# Patient Record
Sex: FEMALE | Race: WHITE | NOT HISPANIC OR LATINO | Employment: OTHER | URBAN - METROPOLITAN AREA
[De-identification: names, ages, dates, MRNs, and addresses within clinical notes are randomized per-mention and may not be internally consistent; named-entity substitution may affect disease eponyms.]

---

## 2017-03-29 DIAGNOSIS — Z00.00 HEALTH CARE MAINTENANCE: Primary | ICD-10-CM

## 2017-04-03 ENCOUNTER — RESULTS ENCOUNTER (OUTPATIENT)
Dept: FAMILY MEDICINE CLINIC | Facility: CLINIC | Age: 77
End: 2017-04-03

## 2017-04-03 DIAGNOSIS — Z00.00 HEALTH CARE MAINTENANCE: ICD-10-CM

## 2017-04-03 LAB
ALBUMIN SERPL-MCNC: 4.5 G/DL (ref 3.5–5.2)
ALBUMIN/GLOB SERPL: 1.8 G/DL
ALP SERPL-CCNC: 49 U/L (ref 39–117)
ALT SERPL-CCNC: 17 U/L (ref 1–33)
AST SERPL-CCNC: 23 U/L (ref 1–32)
BASOPHILS # BLD AUTO: 0.05 10*3/MM3 (ref 0–0.2)
BASOPHILS NFR BLD AUTO: 1 % (ref 0–1.5)
BILIRUB SERPL-MCNC: 0.3 MG/DL (ref 0.1–1.2)
BUN SERPL-MCNC: 14 MG/DL (ref 8–23)
BUN/CREAT SERPL: 17.7 (ref 7–25)
CALCIUM SERPL-MCNC: 9.4 MG/DL (ref 8.6–10.5)
CHLORIDE SERPL-SCNC: 99 MMOL/L (ref 98–107)
CO2 SERPL-SCNC: 28.3 MMOL/L (ref 22–29)
CREAT SERPL-MCNC: 0.79 MG/DL (ref 0.57–1)
EOSINOPHIL # BLD AUTO: 0.21 10*3/MM3 (ref 0–0.7)
EOSINOPHIL NFR BLD AUTO: 4.1 % (ref 0.3–6.2)
ERYTHROCYTE [DISTWIDTH] IN BLOOD BY AUTOMATED COUNT: 12.9 % (ref 11.7–13)
GLOBULIN SER CALC-MCNC: 2.5 GM/DL
GLUCOSE SERPL-MCNC: 81 MG/DL (ref 65–99)
HCT VFR BLD AUTO: 39.1 % (ref 35.6–45.5)
HGB BLD-MCNC: 12.6 G/DL (ref 11.9–15.5)
IMM GRANULOCYTES # BLD: 0 10*3/MM3 (ref 0–0.03)
IMM GRANULOCYTES NFR BLD: 0 % (ref 0–0.5)
LYMPHOCYTES # BLD AUTO: 2.01 10*3/MM3 (ref 0.9–4.8)
LYMPHOCYTES NFR BLD AUTO: 39 % (ref 19.6–45.3)
MCH RBC QN AUTO: 29.9 PG (ref 26.9–32)
MCHC RBC AUTO-ENTMCNC: 32.2 G/DL (ref 32.4–36.3)
MCV RBC AUTO: 92.9 FL (ref 80.5–98.2)
MONOCYTES # BLD AUTO: 0.49 10*3/MM3 (ref 0.2–1.2)
MONOCYTES NFR BLD AUTO: 9.5 % (ref 5–12)
NEUTROPHILS # BLD AUTO: 2.4 10*3/MM3 (ref 1.9–8.1)
NEUTROPHILS NFR BLD AUTO: 46.4 % (ref 42.7–76)
PLATELET # BLD AUTO: 257 10*3/MM3 (ref 140–500)
POTASSIUM SERPL-SCNC: 4.4 MMOL/L (ref 3.5–5.2)
PROT SERPL-MCNC: 7 G/DL (ref 6–8.5)
RBC # BLD AUTO: 4.21 10*6/MM3 (ref 3.9–5.2)
SODIUM SERPL-SCNC: 139 MMOL/L (ref 136–145)
WBC # BLD AUTO: 5.16 10*3/MM3 (ref 4.5–10.7)

## 2017-04-04 NOTE — PROGRESS NOTES
Pt informed and labs were faxed to dr. amada de at [facility] at fax- [phone]

## 2017-04-17 ENCOUNTER — APPOINTMENT (OUTPATIENT)
Dept: WOMENS IMAGING | Facility: HOSPITAL | Age: 77
End: 2017-04-17

## 2017-04-17 PROCEDURE — 77063 BREAST TOMOSYNTHESIS BI: CPT | Performed by: RADIOLOGY

## 2017-04-17 PROCEDURE — G0202 SCR MAMMO BI INCL CAD: HCPCS | Performed by: RADIOLOGY

## 2017-04-17 PROCEDURE — MDREVIEWSP: Performed by: RADIOLOGY

## 2017-05-30 RX ORDER — DILTIAZEM HYDROCHLORIDE 60 MG/1
TABLET, FILM COATED ORAL
Qty: 10.2 G | Refills: 0 | Status: SHIPPED | OUTPATIENT
Start: 2017-05-30 | End: 2017-09-23 | Stop reason: SDUPTHER

## 2017-09-25 RX ORDER — DILTIAZEM HYDROCHLORIDE 60 MG/1
TABLET, FILM COATED ORAL
Qty: 10.2 G | Refills: 0 | Status: SHIPPED | OUTPATIENT
Start: 2017-09-25 | End: 2018-03-06

## 2018-01-02 RX ORDER — DILTIAZEM HYDROCHLORIDE 60 MG/1
TABLET, FILM COATED ORAL
Qty: 10.2 G | Refills: 0 | Status: SHIPPED | OUTPATIENT
Start: 2018-01-02 | End: 2018-04-20 | Stop reason: SDUPTHER

## 2018-02-27 DIAGNOSIS — E78.5 HYPERLIPIDEMIA, UNSPECIFIED HYPERLIPIDEMIA TYPE: ICD-10-CM

## 2018-02-27 DIAGNOSIS — Z00.00 INITIAL MEDICARE ANNUAL WELLNESS VISIT: Primary | ICD-10-CM

## 2018-02-28 LAB
ALBUMIN SERPL-MCNC: 4.5 G/DL (ref 3.5–5.2)
ALBUMIN/GLOB SERPL: 1.7 G/DL
ALP SERPL-CCNC: 53 U/L (ref 39–117)
ALT SERPL-CCNC: 12 U/L (ref 1–33)
AST SERPL-CCNC: 21 U/L (ref 1–32)
BASOPHILS # BLD AUTO: 0.04 10*3/MM3 (ref 0–0.2)
BASOPHILS NFR BLD AUTO: 0.6 % (ref 0–1.5)
BILIRUB SERPL-MCNC: 0.4 MG/DL (ref 0.1–1.2)
BUN SERPL-MCNC: 16 MG/DL (ref 8–23)
BUN/CREAT SERPL: 21.3 (ref 7–25)
CALCIUM SERPL-MCNC: 9.4 MG/DL (ref 8.6–10.5)
CHLORIDE SERPL-SCNC: 98 MMOL/L (ref 98–107)
CHOLEST SERPL-MCNC: 283 MG/DL (ref 0–200)
CO2 SERPL-SCNC: 28.8 MMOL/L (ref 22–29)
CREAT SERPL-MCNC: 0.75 MG/DL (ref 0.57–1)
EOSINOPHIL # BLD AUTO: 0.43 10*3/MM3 (ref 0–0.7)
EOSINOPHIL NFR BLD AUTO: 6.5 % (ref 0.3–6.2)
ERYTHROCYTE [DISTWIDTH] IN BLOOD BY AUTOMATED COUNT: 12.7 % (ref 11.7–13)
GFR SERPLBLD CREATININE-BSD FMLA CKD-EPI: 75 ML/MIN/1.73
GFR SERPLBLD CREATININE-BSD FMLA CKD-EPI: 90 ML/MIN/1.73
GLOBULIN SER CALC-MCNC: 2.6 GM/DL
GLUCOSE SERPL-MCNC: 94 MG/DL (ref 65–99)
HCT VFR BLD AUTO: 40.2 % (ref 35.6–45.5)
HDLC SERPL-MCNC: 86 MG/DL (ref 40–60)
HGB BLD-MCNC: 13 G/DL (ref 11.9–15.5)
IMM GRANULOCYTES # BLD: 0 10*3/MM3 (ref 0–0.03)
IMM GRANULOCYTES NFR BLD: 0 % (ref 0–0.5)
LDLC SERPL CALC-MCNC: 181 MG/DL (ref 0–100)
LDLC/HDLC SERPL: 2.1 {RATIO}
LYMPHOCYTES # BLD AUTO: 2.04 10*3/MM3 (ref 0.9–4.8)
LYMPHOCYTES NFR BLD AUTO: 30.7 % (ref 19.6–45.3)
MCH RBC QN AUTO: 30.2 PG (ref 26.9–32)
MCHC RBC AUTO-ENTMCNC: 32.3 G/DL (ref 32.4–36.3)
MCV RBC AUTO: 93.3 FL (ref 80.5–98.2)
MONOCYTES # BLD AUTO: 0.62 10*3/MM3 (ref 0.2–1.2)
MONOCYTES NFR BLD AUTO: 9.3 % (ref 5–12)
NEUTROPHILS # BLD AUTO: 3.52 10*3/MM3 (ref 1.9–8.1)
NEUTROPHILS NFR BLD AUTO: 52.9 % (ref 42.7–76)
PLATELET # BLD AUTO: 250 10*3/MM3 (ref 140–500)
POTASSIUM SERPL-SCNC: 4.4 MMOL/L (ref 3.5–5.2)
PROT SERPL-MCNC: 7.1 G/DL (ref 6–8.5)
RBC # BLD AUTO: 4.31 10*6/MM3 (ref 3.9–5.2)
SODIUM SERPL-SCNC: 138 MMOL/L (ref 136–145)
TRIGL SERPL-MCNC: 82 MG/DL (ref 0–150)
VLDLC SERPL CALC-MCNC: 16.4 MG/DL (ref 5–40)
WBC # BLD AUTO: 6.65 10*3/MM3 (ref 4.5–10.7)

## 2018-03-06 ENCOUNTER — OFFICE VISIT (OUTPATIENT)
Dept: FAMILY MEDICINE CLINIC | Facility: CLINIC | Age: 78
End: 2018-03-06

## 2018-03-06 VITALS
OXYGEN SATURATION: 97 % | TEMPERATURE: 97.5 F | HEART RATE: 70 BPM | HEIGHT: 61 IN | SYSTOLIC BLOOD PRESSURE: 106 MMHG | BODY MASS INDEX: 25.71 KG/M2 | DIASTOLIC BLOOD PRESSURE: 66 MMHG | WEIGHT: 136.2 LBS

## 2018-03-06 DIAGNOSIS — A31.0 MAI (MYCOBACTERIUM AVIUM-INTRACELLULARE) (HCC): ICD-10-CM

## 2018-03-06 DIAGNOSIS — J47.9 BRONCHIECTASIS WITHOUT COMPLICATION (HCC): ICD-10-CM

## 2018-03-06 DIAGNOSIS — Z00.00 HEALTH CARE MAINTENANCE: Primary | ICD-10-CM

## 2018-03-06 DIAGNOSIS — E78.00 PURE HYPERCHOLESTEROLEMIA: ICD-10-CM

## 2018-03-06 PROCEDURE — G0438 PPPS, INITIAL VISIT: HCPCS | Performed by: INTERNAL MEDICINE

## 2018-03-06 NOTE — PROGRESS NOTES
QUICK REFERENCE INFORMATION:  The ABCs of the Annual Wellness Visit    Initial Medicare Wellness Visit    HEALTH RISK ASSESSMENT    1940    Recent Hospitalizations:  No hospitalization(s) within the last year..        Current Medical Providers:  Patient Care Team:  Zan Leyva MD as PCP - General (Internal Medicine)  No Known Provider as PCP - Family Medicine        Smoking Status:  History   Smoking Status   • Former Smoker   Smokeless Tobacco   • Never Used       Alcohol Consumption:  History   Alcohol Use   • Yes     Comment: Social use       Depression Screen:   PHQ-2/PHQ-9 Depression Screening 3/6/2018   Little interest or pleasure in doing things 0   Feeling down, depressed, or hopeless 0   Total Score 0       Health Habits and Functional and Cognitive Screening:  Functional & Cognitive Status 3/6/2018   Do you have difficulty preparing food and eating? No   Do you have difficulty bathing yourself, getting dressed or grooming yourself? No   Do you have difficulty using the toilet? No   Do you have difficulty moving around from place to place? No   Do you have trouble with steps or getting out of a bed or a chair? No   In the past year have you fallen or experienced a near fall? No   Current Diet Well Balanced Diet   Dental Exam Up to date   Eye Exam Up to date   Exercise (times per week) 7 times per week   Current Exercise Activities Include Walking   Do you need help using the phone?  No   Are you deaf or do you have serious difficulty hearing?  Yes   Do you need help with transportation? No   Do you need help shopping? No   Do you need help preparing meals?  No   Do you need help with housework?  No   Do you need help with laundry? No   Do you need help taking your medications? No   Do you need help managing money? No   Have you felt unusual stress, anger or loneliness in the last month? No   Who do you live with? Spouse   If you need help, do you have trouble finding someone available to you? No    Have you been bothered in the last four weeks by sexual problems? No   Do you have difficulty concentrating, remembering or making decisions? Yes           Does the patient have evidence of cognitive impairment? No    Asiprin use counseling: Does not need ASA (and currently is not on it)      Recent Lab Results:    Visual Acuity:  No exam data present    Age-appropriate Screening Schedule:  Refer to the list below for future screening recommendations based on patient's age, sex and/or medical conditions. Orders for these recommended tests are listed in the plan section. The patient has been provided with a written plan.    Health Maintenance   Topic Date Due   • TDAP/TD VACCINES (1 - Tdap) 05/03/1959   • ZOSTER VACCINE  01/20/2016   • LIPID PANEL  02/28/2019   • MAMMOGRAM  04/17/2019   • INFLUENZA VACCINE  Completed   • PNEUMOCOCCAL VACCINES (65+ LOW/MEDIUM RISK)  Completed        Subjective   History of Present Illness - This patient is here today for a wellness visit.  She also is being treated at [facility] for MARIAMA, hypercholesterolemia, bronchiectasis, hypercholesterolemia.  We reviewed her labs her cholesterol is high she refuses to go on a statin even though I did tell her that high cholesterol increases her risk for having a heart attack or stroke.  She's caught up on her routine screenings.    Assessment plan    Health care maintenance changes in diet continue exercise    MARIAMA she sees an ID doctor at [facility] we will fax labs to her ID doctor and she is on multiple medicines for this.    Hypercholesterolemia-refuses statin recommend diet excise    Bronchiectasis she's on inhalers.    Jenniffer Dumont is a 77 y.o. female who presents for an Annual Wellness Visit.    The following portions of the patient's history were reviewed and updated as appropriate: allergies, current medications, past family history, past medical history, past social history, past surgical history and problem  list.    Outpatient Medications Prior to Visit   Medication Sig Dispense Refill   • Ascorbic Acid (VITAMIN C) 500 MG capsule Take by mouth daily.     • Calcium Carbonate-Vitamin D (CALCIUM 500 + D PO) Take by mouth daily.     • Coenzyme Q10 (COQ-10 PO) Take by mouth.     • fexofenadine (ALLEGRA) 180 MG tablet Take by mouth daily.     • fluticasone (FLONASE) 50 MCG/ACT nasal spray into each nostril.     • Multiple Vitamins-Minerals (MULTIVITAMIN WITH MINERALS) tablet Take 1 tablet by mouth daily.     • SYMBICORT 80-4.5 MCG/ACT inhaler INHALE 2 PUFFS BY MOUTH TWICE DAILY AS DIRECTED 10.2 g 0   • budesonide-formoterol (SYMBICORT) 80-4.5 MCG/ACT inhaler daily.     • SYMBICORT 80-4.5 MCG/ACT inhaler INHALE 2 PUFFS BY MOUTH TWICE DAILY AS DIRECTED 10.2 g 0     No facility-administered medications prior to visit.        Patient Active Problem List   Diagnosis   • Acute infection of nasal sinus   • Bronchiectasis   • Cough   • HLD (hyperlipidemia)   • PNA (pneumonia)   • Carpal bone fracture   • Health care maintenance   • MARIAMA (mycobacterium avium-intracellulare)       Advance Care Planning:  has an advance directive - a copy HAS NOT been provided    Identification of Risk Factors:  Risk factors include: unhealthy diet.    Review of Systems   Constitutional: Negative.    HENT: Negative.    Eyes: Negative.    Respiratory: Positive for cough.    Cardiovascular: Negative.    Gastrointestinal: Negative.    Endocrine: Negative.    Genitourinary: Negative.    Musculoskeletal: Negative.    Skin: Negative.    Allergic/Immunologic: Negative.    Neurological: Negative.    Hematological: Negative.    Psychiatric/Behavioral: Negative.        Compared to one year ago, the patient feels her physical health is the same.  Compared to one year ago, the patient feels her mental health is the same.    Objective     Physical Exam   Constitutional: She is oriented to person, place, and time. She appears well-developed and well-nourished. No  "distress.   HENT:   Head: Normocephalic and atraumatic.   Eyes: Conjunctivae and EOM are normal. Pupils are equal, round, and reactive to light. Right eye exhibits no discharge. Left eye exhibits no discharge. No scleral icterus.   Neck: Normal range of motion. Neck supple. No tracheal deviation present. No thyromegaly present.   Cardiovascular: Normal rate, regular rhythm, normal heart sounds and normal pulses.  Exam reveals no gallop.    No murmur heard.  Pulmonary/Chest: Effort normal. No respiratory distress. She has no wheezes. She has rales. She exhibits no tenderness.   Abdominal: Soft. Bowel sounds are normal. There is no tenderness.   Musculoskeletal: Normal range of motion.   Neurological: She is alert and oriented to person, place, and time. She exhibits normal muscle tone. Coordination normal.   Skin: Skin is warm and dry. No rash noted. No erythema. No pallor.   Psychiatric: She has a normal mood and affect. Her behavior is normal. Judgment and thought content normal.   Nursing note and vitals reviewed.      Vitals:    03/06/18 1505   BP: 106/66   Pulse: 70   Temp: 97.5 °F (36.4 °C)   TempSrc: Oral   SpO2: 97%   Weight: 61.8 kg (136 lb 3.2 oz)   Height: 153.7 cm (60.5\")       Body mass index is 26.16 kg/(m^2).  Discussed the patient's BMI with her. BMI is within normal parameters. No follow-up required.    Assessment/Plan   Patient Self-Management and Personalized Health Advice  The patient has been provided with information about: diet and preventive services including:   · Advance directive.    Visit Diagnoses:    ICD-10-CM ICD-9-CM   1. Health care maintenance Z00.00 V70.0   2. Bronchiectasis without complication J47.9 494.0   3. Pure hypercholesterolemia E78.00 272.0   4. MARIAMA (mycobacterium avium-intracellulare) A31.0 031.0       No orders of the defined types were placed in this encounter.      Outpatient Encounter Prescriptions as of 3/6/2018   Medication Sig Dispense Refill   • Ascorbic Acid " (VITAMIN C) 500 MG capsule Take by mouth daily.     • Calcium Carbonate-Vitamin D (CALCIUM 500 + D PO) Take by mouth daily.     • Coenzyme Q10 (COQ-10 PO) Take by mouth.     • fexofenadine (ALLEGRA) 180 MG tablet Take by mouth daily.     • fluticasone (FLONASE) 50 MCG/ACT nasal spray into each nostril.     • MAGNESIUM PO Take  by mouth.     • Multiple Vitamins-Minerals (MULTIVITAMIN WITH MINERALS) tablet Take 1 tablet by mouth daily.     • SYMBICORT 80-4.5 MCG/ACT inhaler INHALE 2 PUFFS BY MOUTH TWICE DAILY AS DIRECTED 10.2 g 0   • [DISCONTINUED] budesonide-formoterol (SYMBICORT) 80-4.5 MCG/ACT inhaler daily.     • [DISCONTINUED] SYMBICORT 80-4.5 MCG/ACT inhaler INHALE 2 PUFFS BY MOUTH TWICE DAILY AS DIRECTED 10.2 g 0     No facility-administered encounter medications on file as of 3/6/2018.        Reviewed use of high risk medication in the elderly: yes  Reviewed for potential of harmful drug interactions in the elderly: yes    Follow Up:  No Follow-up on file.     An After Visit Summary and PPPS with all of these plans were given to the patient.

## 2018-04-18 ENCOUNTER — APPOINTMENT (OUTPATIENT)
Dept: WOMENS IMAGING | Facility: HOSPITAL | Age: 78
End: 2018-04-18

## 2018-04-18 PROCEDURE — 77063 BREAST TOMOSYNTHESIS BI: CPT | Performed by: RADIOLOGY

## 2018-04-18 PROCEDURE — 77067 SCR MAMMO BI INCL CAD: CPT | Performed by: RADIOLOGY

## 2018-04-20 ENCOUNTER — TELEPHONE (OUTPATIENT)
Dept: FAMILY MEDICINE CLINIC | Facility: CLINIC | Age: 78
End: 2018-04-20

## 2018-04-20 RX ORDER — BUDESONIDE AND FORMOTEROL FUMARATE DIHYDRATE 80; 4.5 UG/1; UG/1
2 AEROSOL RESPIRATORY (INHALATION)
Qty: 10.2 G | Refills: 0 | Status: SHIPPED | OUTPATIENT
Start: 2018-04-20 | End: 2018-05-24 | Stop reason: SDUPTHER

## 2018-05-11 ENCOUNTER — HOSPITAL ENCOUNTER (EMERGENCY)
Facility: HOSPITAL | Age: 78
Discharge: HOME | End: 2018-05-12
Attending: EMERGENCY MEDICINE | Admitting: EMERGENCY MEDICINE

## 2018-05-11 ENCOUNTER — APPOINTMENT (OUTPATIENT)
Dept: GENERAL RADIOLOGY | Facility: HOSPITAL | Age: 78
End: 2018-05-11

## 2018-05-11 DIAGNOSIS — J47.0 BRONCHIECTASIS WITH ACUTE LOWER RESPIRATORY INFECTION (HCC): ICD-10-CM

## 2018-05-11 DIAGNOSIS — R04.2 HEMOPTYSIS: Primary | ICD-10-CM

## 2018-05-11 LAB
ABO GROUP BLD: NORMAL
BASOPHILS # BLD AUTO: 0.04 10*3/MM3 (ref 0–0.2)
BASOPHILS NFR BLD AUTO: 0.7 % (ref 0–1.5)
BLD GP AB SCN SERPL QL: NEGATIVE
DEPRECATED RDW RBC AUTO: 42.3 FL (ref 37–54)
EOSINOPHIL # BLD AUTO: 0.21 10*3/MM3 (ref 0–0.7)
EOSINOPHIL NFR BLD AUTO: 3.4 % (ref 0.3–6.2)
ERYTHROCYTE [DISTWIDTH] IN BLOOD BY AUTOMATED COUNT: 12.5 % (ref 11.7–13)
HCT VFR BLD AUTO: 38.1 % (ref 35.6–45.5)
HGB BLD-MCNC: 12.5 G/DL (ref 11.9–15.5)
IMM GRANULOCYTES # BLD: 0 10*3/MM3 (ref 0–0.03)
IMM GRANULOCYTES NFR BLD: 0 % (ref 0–0.5)
LYMPHOCYTES # BLD AUTO: 2.09 10*3/MM3 (ref 0.9–4.8)
LYMPHOCYTES NFR BLD AUTO: 34 % (ref 19.6–45.3)
MCH RBC QN AUTO: 30.6 PG (ref 26.9–32)
MCHC RBC AUTO-ENTMCNC: 32.8 G/DL (ref 32.4–36.3)
MCV RBC AUTO: 93.4 FL (ref 80.5–98.2)
MONOCYTES # BLD AUTO: 0.5 10*3/MM3 (ref 0.2–1.2)
MONOCYTES NFR BLD AUTO: 8.1 % (ref 5–12)
NEUTROPHILS # BLD AUTO: 3.3 10*3/MM3 (ref 1.9–8.1)
NEUTROPHILS NFR BLD AUTO: 53.8 % (ref 42.7–76)
PLATELET # BLD AUTO: 208 10*3/MM3 (ref 140–500)
PMV BLD AUTO: 9.6 FL (ref 6–12)
RBC # BLD AUTO: 4.08 10*6/MM3 (ref 3.9–5.2)
RH BLD: POSITIVE
T&S EXPIRATION DATE: NORMAL
WBC NRBC COR # BLD: 6.14 10*3/MM3 (ref 4.5–10.7)

## 2018-05-11 PROCEDURE — 36415 COLL VENOUS BLD VENIPUNCTURE: CPT

## 2018-05-11 PROCEDURE — 86900 BLOOD TYPING SEROLOGIC ABO: CPT

## 2018-05-11 PROCEDURE — 86850 RBC ANTIBODY SCREEN: CPT

## 2018-05-11 PROCEDURE — 80053 COMPREHEN METABOLIC PANEL: CPT | Performed by: EMERGENCY MEDICINE

## 2018-05-11 PROCEDURE — 85025 COMPLETE CBC W/AUTO DIFF WBC: CPT

## 2018-05-11 PROCEDURE — 99284 EMERGENCY DEPT VISIT MOD MDM: CPT

## 2018-05-11 PROCEDURE — 86901 BLOOD TYPING SEROLOGIC RH(D): CPT

## 2018-05-11 RX ORDER — SODIUM CHLORIDE 0.9 % (FLUSH) 0.9 %
10 SYRINGE (ML) INJECTION AS NEEDED
Status: DISCONTINUED | OUTPATIENT
Start: 2018-05-11 | End: 2018-05-12 | Stop reason: HOSPADM

## 2018-05-12 ENCOUNTER — APPOINTMENT (OUTPATIENT)
Dept: CT IMAGING | Facility: HOSPITAL | Age: 78
End: 2018-05-12

## 2018-05-12 VITALS
WEIGHT: 132 LBS | BODY MASS INDEX: 24.92 KG/M2 | OXYGEN SATURATION: 98 % | HEART RATE: 76 BPM | RESPIRATION RATE: 18 BRPM | SYSTOLIC BLOOD PRESSURE: 129 MMHG | DIASTOLIC BLOOD PRESSURE: 84 MMHG | TEMPERATURE: 98.1 F | HEIGHT: 61 IN

## 2018-05-12 LAB
ALBUMIN SERPL-MCNC: 4.3 G/DL (ref 3.5–5.2)
ALBUMIN/GLOB SERPL: 1.7 G/DL
ALP SERPL-CCNC: 49 U/L (ref 39–117)
ALT SERPL W P-5'-P-CCNC: 13 U/L (ref 1–33)
ANION GAP SERPL CALCULATED.3IONS-SCNC: 9.6 MMOL/L
AST SERPL-CCNC: 17 U/L (ref 1–32)
BILIRUB SERPL-MCNC: 0.2 MG/DL (ref 0.1–1.2)
BUN BLD-MCNC: 18 MG/DL (ref 8–23)
BUN/CREAT SERPL: 23.7 (ref 7–25)
CALCIUM SPEC-SCNC: 9.4 MG/DL (ref 8.6–10.5)
CHLORIDE SERPL-SCNC: 104 MMOL/L (ref 98–107)
CO2 SERPL-SCNC: 28.4 MMOL/L (ref 22–29)
CREAT BLD-MCNC: 0.76 MG/DL (ref 0.57–1)
GFR SERPL CREATININE-BSD FRML MDRD: 73 ML/MIN/1.73
GLOBULIN UR ELPH-MCNC: 2.5 GM/DL
GLUCOSE BLD-MCNC: 98 MG/DL (ref 65–99)
POTASSIUM BLD-SCNC: 4 MMOL/L (ref 3.5–5.2)
PROT SERPL-MCNC: 6.8 G/DL (ref 6–8.5)
SODIUM BLD-SCNC: 142 MMOL/L (ref 136–145)

## 2018-05-12 PROCEDURE — 0 IOPAMIDOL PER 1 ML: Performed by: EMERGENCY MEDICINE

## 2018-05-12 PROCEDURE — 71275 CT ANGIOGRAPHY CHEST: CPT

## 2018-05-12 RX ADMIN — IOPAMIDOL 95 ML: 755 INJECTION, SOLUTION INTRAVENOUS at 00:39

## 2018-05-12 NOTE — ED TRIAGE NOTES
To ER via [facility].  Seen at [facility] and sent to ER.  Pt states started coughing up blood yesterday evening.  Started back up again tonight.

## 2018-05-12 NOTE — ED NOTES
Requested a CD of the patients CT scan chest on a disk to the film library at 8219. They will call when ready for pickup.      Amanda Harley  05/12/18 0123

## 2018-05-12 NOTE — ED PROVIDER NOTES
" EMERGENCY DEPARTMENT ENCOUNTER    CHIEF COMPLAINT  Chief Complaint: coughing up blood  History given by: pt  History limited by: nothing  Room Number: 05/05  PMD: Josep Jefferson MD      HPI:  Pt is a 78 y.o. female with h/o of MARIAMA and bronchiectasis who presents complaining of episodic coughing up \"bright red\" blood that began yesterday evening while she was pulling weeds. Pt reports that she had another episode of coughing up \"four tablespoons\" of \"bright red\" blood earlier today while she was riding in the car. Pt also reports that she had a previous episode of coughing up blood \"years ago\", but she was \"very ill at that time\". Pt denies any pain or any other symptoms at this time. Pt was at the  earlier today and was advised to go to the ER for a CT scan.     Duration: Began yesterday evening  Onset: sudden  Timing: episodic  Quality: coughing up blood  Intensity/Severity: moderate  Previous Episodes: Pt reports that she had a previous episode of coughing up blood \"years ago\", but she was \"very ill at that time\".   Treatment before arrival: Pt was at the  earlier today and was advised to go to the ER for a CT scan.     PAST MEDICAL HISTORY  Active Ambulatory Problems     Diagnosis Date Noted   • Acute infection of nasal sinus 02/05/2016   • Bronchiectasis 02/05/2016   • Cough 02/05/2016   • HLD (hyperlipidemia) 02/05/2016   • PNA (pneumonia) 02/05/2016   • Carpal bone fracture 02/05/2016   • Health care maintenance 02/05/2016   • MARIAMA (mycobacterium avium-intracellulare) 03/06/2018     Resolved Ambulatory Problems     Diagnosis Date Noted   • No Resolved Ambulatory Problems     Past Medical History:   Diagnosis Date   • Bronchiectasis    • History of bronchoscopy    • Hyperlipidemia    • MARIAMA (mycobacterium avium-intracellulare)        PAST SURGICAL HISTORY  Past Surgical History:   Procedure Laterality Date   • HYSTERECTOMY     • TONSILLECTOMY         FAMILY HISTORY  Family History   Problem Relation Age of " Onset   • Heart disease Father        SOCIAL HISTORY  Social History     Social History   • Marital status: Single     Spouse name: N/A   • Number of children: N/A   • Years of education: N/A     Occupational History   • Not on file.     Social History Main Topics   • Smoking status: Former Smoker   • Smokeless tobacco: Never Used   • Alcohol use Yes      Comment: Social use   • Drug use: No   • Sexual activity: Defer     Other Topics Concern   • Not on file     Social History Narrative   • No narrative on file       ALLERGIES  Amoxicillin; Ampicillin; and Dust mite extract    REVIEW OF SYSTEMS  Review of Systems   Constitutional: Negative for fever.   HENT: Negative for sore throat.    Eyes: Negative.    Respiratory: Positive for cough (with blood). Negative for shortness of breath.    Cardiovascular: Negative for chest pain.   Gastrointestinal: Negative for abdominal pain, diarrhea and vomiting.   Genitourinary: Negative for dysuria.   Musculoskeletal: Negative for neck pain.   Skin: Negative for rash.   Allergic/Immunologic: Negative.    Neurological: Negative for weakness, numbness and headaches.   Hematological: Negative.    Psychiatric/Behavioral: Negative.    All other systems reviewed and are negative.      PHYSICAL EXAM  ED Triage Vitals   Temp Heart Rate Resp BP SpO2   05/11/18 2004 05/11/18 2004 05/11/18 2004 05/11/18 2015 05/11/18 2004   98.6 °F (37 °C) 80 16 148/87 96 %      Temp src Heart Rate Source Patient Position BP Location FiO2 (%)   05/11/18 2004 05/11/18 2004 05/11/18 2219 05/11/18 2219 --   Tympanic Monitor Sitting Right arm        Physical Exam   Constitutional: She is oriented to person, place, and time and well-developed, well-nourished, and in no distress. No distress.   HENT:   Head: Normocephalic and atraumatic.   Eyes: EOM are normal. Pupils are equal, round, and reactive to light.   Neck: Normal range of motion. Neck supple.   Cardiovascular: Normal rate, regular rhythm and normal  heart sounds.    Pulmonary/Chest: Effort normal and breath sounds normal. No respiratory distress.   Abdominal: Soft. There is no tenderness. There is no rebound and no guarding.   Musculoskeletal: Normal range of motion. She exhibits no edema.   Neurological: She is alert and oriented to person, place, and time. She has normal sensation and normal strength.   Skin: Skin is warm and dry. No rash noted.   Psychiatric: Mood and affect normal.   Nursing note and vitals reviewed.      LAB RESULTS  Lab Results (last 24 hours)     Procedure Component Value Units Date/Time    CBC & Differential [[id number]] Collected:  05/11/18 2027    Specimen:  Blood Updated:  05/11/18 2045    Narrative:       The following orders were created for panel order CBC & Differential.  Procedure                               Abnormality         Status                     ---------                               -----------         ------                     CBC Auto Differential[[id number]]        Normal              Final result                 Please view results for these tests on the individual orders.    CBC Auto Differential [[id number]]  (Normal) Collected:  05/11/18 2027    Specimen:  Blood Updated:  05/11/18 2045     WBC 6.14 10*3/mm3      RBC 4.08 10*6/mm3      Hemoglobin 12.5 g/dL      Hematocrit 38.1 %      MCV 93.4 fL      MCH 30.6 pg      MCHC 32.8 g/dL      RDW 12.5 %      RDW-SD 42.3 fl      MPV 9.6 fL      Platelets 208 10*3/mm3      Neutrophil % 53.8 %      Lymphocyte % 34.0 %      Monocyte % 8.1 %      Eosinophil % 3.4 %      Basophil % 0.7 %      Immature Grans % 0.0 %      Neutrophils, Absolute 3.30 10*3/mm3      Lymphocytes, Absolute 2.09 10*3/mm3      Monocytes, Absolute 0.50 10*3/mm3      Eosinophils, Absolute 0.21 10*3/mm3      Basophils, Absolute 0.04 10*3/mm3      Immature Grans, Absolute 0.00 10*3/mm3     Comprehensive Metabolic Panel [[id number]] Collected:  05/11/18 1408    Specimen:  Blood Updated:  05/12/18 0016      Glucose 98 mg/dL      BUN 18 mg/dL      Creatinine 0.76 mg/dL      Sodium 142 mmol/L      Potassium 4.0 mmol/L      Chloride 104 mmol/L      CO2 28.4 mmol/L      Calcium 9.4 mg/dL      Total Protein 6.8 g/dL      Albumin 4.30 g/dL      ALT (SGPT) 13 U/L      AST (SGOT) 17 U/L      Alkaline Phosphatase 49 U/L      Total Bilirubin 0.2 mg/dL      eGFR Non African Amer 73 mL/min/1.73      Globulin 2.5 gm/dL      A/G Ratio 1.7 g/dL      BUN/Creatinine Ratio 23.7     Anion Gap 9.6 mmol/L     Narrative:       The MDRD GFR formula is only valid for adults with stable renal function between ages 18 and 70.          I ordered the above labs and reviewed the results    RADIOLOGY  CT Angiogram Chest With Contrast   Preliminary Result   1.  No evidence for acute pulmonary emboli.   2.  Mild infiltrates in the right lung, follow-up to resolution is   recommended.   3.  Chronic lung changes and emphysema with bronchiectatic changes.                       I ordered the above noted radiological studies. Interpreted by radiologist. Reviewed by me in PACS.       PROCEDURES  Procedures      PROGRESS AND CONSULTS  ED Course     0116 Rechecked pt who is no distress. Discussed imagining findings that show infection of the right lung. Also discussed the plan for discharge. Advised the pt to f/u with her PCP for further evaluation. Also advised the pt to return to the ED if her symptoms worsen. Pt understands and agrees with the plan, all questions answered.      MEDICAL DECISION MAKING  Results were reviewed/discussed with the patient and they were also made aware of online access. Pt also made aware that some labs, such as cultures, will not be resulted during ER visit and follow up with PMD is necessary.     MDM  Number of Diagnoses or Management Options     Amount and/or Complexity of Data Reviewed  Clinical lab tests: ordered and reviewed (unremarkable)  Tests in the radiology section of CPT®: ordered and reviewed (CT Angio Chest shows  mild infiltrates in the right lung.)  Decide to obtain previous medical records or to obtain history from someone other than the patient: yes  Review and summarize past medical records: yes (Pt has no previous ED visits.)  Independent visualization of images, tracings, or specimens: yes    Patient Progress  Patient progress: stable         DIAGNOSIS  Final diagnoses:   Hemoptysis   Bronchiectasis with acute lower respiratory infection       DISPOSITION  DISCHARGE    Patient discharged in stable condition.    Reviewed implications of results, diagnosis, meds, responsibility to follow up, warning signs and symptoms of possible worsening, potential complications and reasons to return to ER, including any new or worsening symptoms.    Patient/Family voiced understanding of above instructions.    Discussed plan for discharge, as there is no emergent indication for admission. Patient referred to primary care provider for BP management due to today's BP. Pt/family is agreeable and understands need for follow up and repeat testing.  Pt is aware that discharge does not mean that nothing is wrong but it indicates no emergency is present that requires admission and they must continue care with follow-up as given below or physician of their choice.     FOLLOW-UP  Josep Jefferson MD  [address] [address]  [phone]    Schedule an appointment as soon as possible for a visit            Medication List      Changed    budesonide-formoterol 80-4.5 MCG/ACT inhaler  Commonly known as:  SYMBICORT  Inhale 2 puffs 2 (Two) Times a Day.  What changed:  when to take this              Latest Documented Vital Signs:  As of 4:56 AM  BP- 129/84 HR- 76 Temp- 98.1 °F (36.7 °C) (Oral) O2 sat- 98%    --  Documentation assistance provided by chelsey Ordonez for Dr. Sales.  Information recorded by the chelsey was done at my direction and has been verified and validated by me.     Jillian Ordonez  05/12/18 0224        Bon Toledo MD  05/12/18 0457

## 2018-05-16 ENCOUNTER — TELEPHONE (OUTPATIENT)
Dept: SOCIAL WORK | Facility: HOSPITAL | Age: 78
End: 2018-05-16

## 2018-05-24 RX ORDER — DILTIAZEM HYDROCHLORIDE 60 MG/1
TABLET, FILM COATED ORAL
Qty: 10.2 G | Refills: 0 | OUTPATIENT
Start: 2018-05-24

## 2018-05-24 RX ORDER — BUDESONIDE AND FORMOTEROL FUMARATE DIHYDRATE 80; 4.5 UG/1; UG/1
2 AEROSOL RESPIRATORY (INHALATION) DAILY
Qty: 10.2 G | Refills: 1 | Status: SHIPPED | OUTPATIENT
Start: 2018-05-24 | End: 2019-01-14 | Stop reason: SDUPTHER

## 2019-01-14 ENCOUNTER — OFFICE VISIT (OUTPATIENT)
Dept: FAMILY MEDICINE CLINIC | Facility: CLINIC | Age: 79
End: 2019-01-14

## 2019-01-14 VITALS
TEMPERATURE: 98.2 F | SYSTOLIC BLOOD PRESSURE: 126 MMHG | OXYGEN SATURATION: 98 % | BODY MASS INDEX: 25.39 KG/M2 | DIASTOLIC BLOOD PRESSURE: 78 MMHG | WEIGHT: 134.5 LBS | HEIGHT: 61 IN | HEART RATE: 76 BPM

## 2019-01-14 DIAGNOSIS — A31.0 MAI (MYCOBACTERIUM AVIUM-INTRACELLULARE) (HCC): ICD-10-CM

## 2019-01-14 DIAGNOSIS — J47.9 BRONCHIECTASIS WITHOUT COMPLICATION (HCC): ICD-10-CM

## 2019-01-14 DIAGNOSIS — R05.9 COUGH: ICD-10-CM

## 2019-01-14 DIAGNOSIS — E55.9 HYPOVITAMINOSIS D: ICD-10-CM

## 2019-01-14 DIAGNOSIS — E78.00 PURE HYPERCHOLESTEROLEMIA: Primary | ICD-10-CM

## 2019-01-14 DIAGNOSIS — R73.9 HYPERGLYCEMIA: ICD-10-CM

## 2019-01-14 PROBLEM — Z85.820 HISTORY OF MELANOMA: Status: ACTIVE | Noted: 2019-01-14

## 2019-01-14 PROBLEM — J30.9 ALLERGIC SINUSITIS: Status: ACTIVE | Noted: 2019-01-14

## 2019-01-14 PROCEDURE — 99214 OFFICE O/P EST MOD 30 MIN: CPT | Performed by: FAMILY MEDICINE

## 2019-01-14 RX ORDER — BUDESONIDE AND FORMOTEROL FUMARATE DIHYDRATE 80; 4.5 UG/1; UG/1
2 AEROSOL RESPIRATORY (INHALATION) DAILY
Qty: 10.2 G | Refills: 11 | Status: SHIPPED | OUTPATIENT
Start: 2019-01-14 | End: 2020-01-15 | Stop reason: SDUPTHER

## 2019-01-14 RX ORDER — ALBUTEROL SULFATE 2.5 MG/3ML
2.5 SOLUTION RESPIRATORY (INHALATION) EVERY 6 HOURS PRN
COMMUNITY
Start: 2017-07-24 | End: 2019-01-14 | Stop reason: SDUPTHER

## 2019-01-14 RX ORDER — BUDESONIDE AND FORMOTEROL FUMARATE DIHYDRATE 80; 4.5 UG/1; UG/1
2 AEROSOL RESPIRATORY (INHALATION) 2 TIMES DAILY
COMMUNITY
Start: 2015-05-06 | End: 2019-01-14

## 2019-01-14 RX ORDER — ALBUTEROL SULFATE 2.5 MG/3ML
2.5 SOLUTION RESPIRATORY (INHALATION) EVERY 6 HOURS PRN
Qty: 100 VIAL | Refills: 11 | Status: SHIPPED | OUTPATIENT
Start: 2019-01-14 | End: 2019-05-21 | Stop reason: SDUPTHER

## 2019-01-14 NOTE — PROGRESS NOTES
Jenniffer Dumont is a 78 y.o. female.     Chief Complaint   Patient presents with   • Establish Care     new pt establishing today with dr dodge   • Hyperlipidemia     follow up no complains        HPI     Pt is a pleasant 78 y.o. YO female here for HLD well controled on current meds.  PMH includes HLD well controlled, Macobacterium.    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  She was discharge from them earlier this year.  She does have chronic severe congestion with coughing spells that often last for multiple minutes.  There is no treatment that stops the cough.  She needs to clear the mucus and drainage.  She has chronic bronchiectasis on the right upper portion of the posterior lobe of the lung.  She otherwise feels well, does get exertional dyspnea but has adjusted her life to accommodate this.    Hyperlipidemia well controlled, she is not on cholesterol medications.  She is very evident to control with lesser changes and does not want any medication unless absolutely necessary.    The following portions of the patient's history were reviewed and updated as appropriate: allergies, current medications, past family history, past medical history, past social history, past surgical history and problem list.    Review of Systems   Constitutional: Negative.    HENT: Positive for ear pain.    Eyes: Negative.    Respiratory: Positive for shortness of breath.    Cardiovascular: Negative for chest pain, palpitations and leg swelling.   Gastrointestinal: Negative.  Negative for abdominal distention, abdominal pain, anal bleeding, blood in stool, constipation, diarrhea, nausea, rectal pain and vomiting.   Musculoskeletal: Positive for arthralgias (back and neck) and myalgias.   Psychiatric/Behavioral: Negative for agitation, behavioral problems, confusion, decreased concentration and sleep disturbance.        Objective  Vitals:    01/14/19 1337   BP: 126/78   Pulse: 76   Temp: 98.2 °F (36.8 °C)   SpO2: 98%        Physical Exam   Constitutional: She is oriented to person, place, and time. She appears well-developed and well-nourished. No distress.   HENT:   Head: Normocephalic.   Right Ear: External ear normal.   Left Ear: External ear normal.   Nose: Nose normal.   Eyes: EOM are normal.   Cardiovascular: Normal rate, regular rhythm, normal heart sounds and intact distal pulses.   No murmur heard.  Pulmonary/Chest: Effort normal and breath sounds normal. No respiratory distress.   Crackles the right upper lobe of the lung.   Musculoskeletal: Normal range of motion.   Neurological: She is alert and oriented to person, place, and time.   Skin: Skin is warm and dry. No rash noted.   Psychiatric: She has a normal mood and affect. Her behavior is normal. Judgment and thought content normal.   Nursing note and vitals reviewed.        Current Outpatient Medications:   •  albuterol (PROVENTIL) (2.5 MG/3ML) 0.083% nebulizer solution, Take 2.5 mg by nebulization Every 6 (Six) Hours As Needed for Wheezing or Shortness of Air., Disp: 100 vial, Rfl: 11  •  Ascorbic Acid (VITAMIN C) 500 MG capsule, Take  by mouth. Three times weekly, Disp: , Rfl:   •  budesonide-formoterol (SYMBICORT) 80-4.5 MCG/ACT inhaler, Inhale 2 puffs Daily., Disp: 10.2 g, Rfl: 11  •  Calcium Carbonate-Vitamin D (CALCIUM 500 + D PO), Take by mouth daily., Disp: , Rfl:   •  Coenzyme Q10 (COQ-10 PO), Take by mouth., Disp: , Rfl:   •  fexofenadine (ALLEGRA) 180 MG tablet, Take by mouth daily., Disp: , Rfl:   •  fluticasone (FLONASE) 50 MCG/ACT nasal spray, into each nostril., Disp: , Rfl:   •  MAGNESIUM PO, Take  by mouth., Disp: , Rfl:   •  Multiple Vitamins-Minerals (MULTIVITAMIN WITH MINERALS) tablet, Take 1 tablet by mouth daily., Disp: , Rfl:     Procedures    Lab Results (most recent)     Keisha Abad was seen today for establish care and  hyperlipidemia.    Diagnoses and all orders for this visit:    Pure hypercholesterolemia  -     Comprehensive Metabolic Panel  -     Lipid Panel    MARIAMA (mycobacterium avium-intracellulare) (CMS/[facility])  -     budesonide-formoterol (SYMBICORT) 80-4.5 MCG/ACT inhaler; Inhale 2 puffs Daily.  -     albuterol (PROVENTIL) (2.5 MG/3ML) 0.083% nebulizer solution; Take 2.5 mg by nebulization Every 6 (Six) Hours As Needed for Wheezing or Shortness of Air.  -     Comprehensive Metabolic Panel    Bronchiectasis without complication (CMS/[facility])  -     budesonide-formoterol (SYMBICORT) 80-4.5 MCG/ACT inhaler; Inhale 2 puffs Daily.  -     albuterol (PROVENTIL) (2.5 MG/3ML) 0.083% nebulizer solution; Take 2.5 mg by nebulization Every 6 (Six) Hours As Needed for Wheezing or Shortness of Air.    Cough  -     budesonide-formoterol (SYMBICORT) 80-4.5 MCG/ACT inhaler; Inhale 2 puffs Daily.  -     albuterol (PROVENTIL) (2.5 MG/3ML) 0.083% nebulizer solution; Take 2.5 mg by nebulization Every 6 (Six) Hours As Needed for Wheezing or Shortness of Air.    Hyperglycemia  -     Comprehensive Metabolic Panel  -     Hemoglobin A1c    Hypovitaminosis D  -     Vitamin D 25 Hydroxy      Pleasant 78-year-old female here as a new patient.  She has a consultative history despite being relatively healthy now without prescription medications aside from Symbicort and Proventil.  Her bronchiectasis is well controlled.  She does need refills of both of these medications, she does have a pulmonologist who she will be following up with.  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    Hyperlipidemia well controlled, not on medication currently, does not want to start.    History of hyperglycemia, recheck A1c.  Will follow-up for wellness exam.    Return in about 4 weeks (around 2/11/2019), or if symptoms worsen or fail to improve,  for Medicare Wellness.      Su Castaneda MD

## 2019-02-16 LAB
25(OH)D3+25(OH)D2 SERPL-MCNC: 30.6 NG/ML (ref 30–100)
ALBUMIN SERPL-MCNC: 4.6 G/DL (ref 3.5–4.7)
ALBUMIN/GLOB SERPL: 1.8 {RATIO} (ref 1.2–2.2)
ALP SERPL-CCNC: 58 IU/L (ref 39–117)
ALT SERPL-CCNC: 19 IU/L (ref 0–32)
AST SERPL-CCNC: 26 IU/L (ref 0–40)
BILIRUB SERPL-MCNC: 0.4 MG/DL (ref 0–1.2)
BUN SERPL-MCNC: 14 MG/DL (ref 8–27)
BUN/CREAT SERPL: 16 (ref 12–28)
CALCIUM SERPL-MCNC: 9.6 MG/DL (ref 8.7–10.3)
CHLORIDE SERPL-SCNC: 102 MMOL/L (ref 96–106)
CHOLEST SERPL-MCNC: 266 MG/DL (ref 100–199)
CO2 SERPL-SCNC: 24 MMOL/L (ref 20–29)
CREAT SERPL-MCNC: 0.88 MG/DL (ref 0.57–1)
GLOBULIN SER CALC-MCNC: 2.6 G/DL (ref 1.5–4.5)
GLUCOSE SERPL-MCNC: 100 MG/DL (ref 65–99)
HBA1C MFR BLD: 5.8 % (ref 4.8–5.6)
HDLC SERPL-MCNC: 87 MG/DL
LDLC SERPL CALC-MCNC: 167 MG/DL (ref 0–99)
POTASSIUM SERPL-SCNC: 4.7 MMOL/L (ref 3.5–5.2)
PROT SERPL-MCNC: 7.2 G/DL (ref 6–8.5)
SODIUM SERPL-SCNC: 144 MMOL/L (ref 134–144)
TRIGL SERPL-MCNC: 60 MG/DL (ref 0–149)
VLDLC SERPL CALC-MCNC: 12 MG/DL (ref 5–40)

## 2019-02-27 ENCOUNTER — OFFICE VISIT (OUTPATIENT)
Dept: FAMILY MEDICINE CLINIC | Facility: CLINIC | Age: 79
End: 2019-02-27

## 2019-02-27 VITALS
OXYGEN SATURATION: 97 % | WEIGHT: 135 LBS | BODY MASS INDEX: 25.49 KG/M2 | HEART RATE: 66 BPM | DIASTOLIC BLOOD PRESSURE: 76 MMHG | TEMPERATURE: 98 F | HEIGHT: 61 IN | SYSTOLIC BLOOD PRESSURE: 122 MMHG

## 2019-02-27 DIAGNOSIS — R42 VERTIGO: ICD-10-CM

## 2019-02-27 DIAGNOSIS — J47.9 BRONCHIECTASIS WITHOUT COMPLICATION (HCC): ICD-10-CM

## 2019-02-27 DIAGNOSIS — E88.81 INSULIN RESISTANCE: Primary | ICD-10-CM

## 2019-02-27 PROCEDURE — 99214 OFFICE O/P EST MOD 30 MIN: CPT | Performed by: FAMILY MEDICINE

## 2019-02-27 NOTE — PROGRESS NOTES
"QUICK REFERENCE INFORMATION:  The ABCs of the Annual Wellness Visit    Subsequent Medicare Wellness Visit    HEALTH RISK ASSESSMENT    1940    Recent Hospitalizations:  {Hospitalization history:6080034550::\"No hospitalization(s) within the last year.\"}.        Current Medical Providers:  Patient Care Team:  Su Castaneda MD as PCP - General (Family Medicine)        Smoking Status:  Social History     Tobacco Use   Smoking Status Former Smoker   • Last attempt to quit:    • Years since quittin.1   Smokeless Tobacco Never Used       Alcohol Consumption:  Social History     Substance and Sexual Activity   Alcohol Use Yes   • Alcohol/week: 1.2 oz   • Types: 2 Glasses of wine per week   • Frequency: 2-4 times a month   • Drinks per session: 1 or 2    Comment: Social use       Depression Screen:   PHQ-2/PHQ-9 Depression Screening 2019   Little interest or pleasure in doing things 0   Feeling down, depressed, or hopeless 0   Total Score 0       Health Habits and Functional and Cognitive Screening:  Functional & Cognitive Status 2019   Do you have difficulty preparing food and eating? No   Do you have difficulty bathing yourself, getting dressed or grooming yourself? No   Do you have difficulty using the toilet? No   Do you have difficulty moving around from place to place? No   Do you have trouble with steps or getting out of a bed or a chair? -   In the past year have you fallen or experienced a near fall? -   Current Diet Well Balanced Diet   Dental Exam Up to date   Eye Exam Up to date   Exercise (times per week) 7 times per week   Current Exercise Activities Include Yoga   Do you need help using the phone?  No   Are you deaf or do you have serious difficulty hearing?  No   Do you need help with transportation? No   Do you need help shopping? No   Do you need help preparing meals?  No   Do you need help with housework?  No   Do you need help with laundry? No   Do you need help taking your " "medications? No   Do you need help managing money? No   Do you ever drive or ride in a car without wearing a seat belt? No   Have you felt unusual stress, anger or loneliness in the last month? No   Who do you live with? Spouse   If you need help, do you have trouble finding someone available to you? No   Have you been bothered in the last four weeks by sexual problems? No   Do you have difficulty concentrating, remembering or making decisions? No           Does the patient have evidence of cognitive impairment? {Yes/No w/ pre-defaulted No:69117::\"No\"}    Aspirin use counseling: {Aspirin :47294}      Recent Lab Results:  CMP:  Lab Results   Component Value Date     (H) 02/15/2019    BUN 14 02/15/2019    CREATININE 0.88 02/15/2019    EGFRIFNONA 62 02/15/2019    EGFRIFAFRI 72 02/15/2019    BCR 16 02/15/2019     02/15/2019    K 4.7 02/15/2019    CO2 24 02/15/2019    CALCIUM 9.6 02/15/2019    PROTENTOTREF 7.2 02/15/2019    ALBUMIN 4.6 02/15/2019    LABGLOBREF 2.6 02/15/2019    LABIL2 1.8 02/15/2019    BILITOT 0.4 02/15/2019    ALKPHOS 58 02/15/2019    AST 26 02/15/2019    ALT 19 02/15/2019     Lipid Panel:  Lab Results   Component Value Date    TRIG 60 02/15/2019    HDL 87 02/15/2019    VLDL 12 02/15/2019    LDLHDL 2.10 02/28/2018     HbA1c:  Lab Results   Component Value Date    HGBA1C 5.8 (H) 02/15/2019       Visual Acuity:  No exam data present    Age-appropriate Screening Schedule:  Refer to the list below for future screening recommendations based on patient's age, sex and/or medical conditions. Orders for these recommended tests are listed in the plan section. The patient has been provided with a written plan.    Health Maintenance   Topic Date Due   • TDAP/TD VACCINES (1 - Tdap) 05/03/1957   • ZOSTER VACCINE (1 of 2) 05/03/1988   • MAMMOGRAM  04/17/2019   • LIPID PANEL  02/15/2020   • INFLUENZA VACCINE  Completed   • PNEUMOCOCCAL VACCINES (65+ LOW/MEDIUM RISK)  Completed        Subjective " "  History of Present Illness    Jenniffer Dumont is a 78 y.o. female who presents for an Subsequent Wellness Visit.    The following portions of the patient's history were reviewed and updated as appropriate: {history reviewed:20406::\"allergies\",\"current medications\",\"past family history\",\"past medical history\",\"past social history\",\"past surgical history\",\"problem list\"}.    Outpatient Medications Prior to Visit   Medication Sig Dispense Refill   • albuterol (PROVENTIL) (2.5 MG/3ML) 0.083% nebulizer solution Take 2.5 mg by nebulization Every 6 (Six) Hours As Needed for Wheezing or Shortness of Air. 100 vial 11   • Ascorbic Acid (VITAMIN C) 500 MG capsule Take  by mouth. Three times weekly     • budesonide-formoterol (SYMBICORT) 80-4.5 MCG/ACT inhaler Inhale 2 puffs Daily. 10.2 g 11   • Calcium Carbonate-Vitamin D (CALCIUM 500 + D PO) Take 2,000 Units by mouth Daily.     • Coenzyme Q10 (COQ-10 PO) Take by mouth.     • fexofenadine (ALLEGRA) 180 MG tablet Take by mouth daily.     • fluticasone (FLONASE) 50 MCG/ACT nasal spray into each nostril.     • MAGNESIUM PO Take  by mouth.     • Multiple Vitamins-Minerals (MULTIVITAMIN WITH MINERALS) tablet Take 1 tablet by mouth daily.       No facility-administered medications prior to visit.        Patient Active Problem List   Diagnosis   • Bronchiectasis without complication (CMS/[facility])   • Cough   • HLD (hyperlipidemia)   • MARIAMA (mycobacterium avium-intracellulare) (CMS/[facility])   • Allergic sinusitis   • History of melanoma       Advance Care Planning:  {Advance Directive Status:37906}    Identification of Risk Factors:  Risk factors include: {; WELLNESS RISK FACTORS:66941}.    Review of Systems   HENT: Positive for hearing loss.    Eyes: Negative.    Respiratory: Negative.    Cardiovascular: Negative.    Gastrointestinal: Negative.    Endocrine: Negative.    Genitourinary: Negative.    Musculoskeletal: Positive for arthralgias.   Allergic/Immunologic: Positive for " "environmental allergies.   Neurological: Positive for dizziness.        VERTIGO   Hematological: Negative.    Psychiatric/Behavioral: Negative.        Compared to one year ago, the patient feels her physical health is {better worse same:47868}.  Compared to one year ago, the patient feels her mental health is {better worse same:69541}.    Objective     Physical Exam    Vitals:    02/27/19 0945   BP: 122/76   Pulse: 66   Temp: 98 °F (36.7 °C)   TempSrc: Oral   SpO2: 97%   Weight: 61.2 kg (135 lb)   Height: 154.9 cm (61\")   PainSc: 0-No pain       Patient's Body mass index is 25.51 kg/m². BMI is {BMI range:60486}.      Assessment/Plan   Patient Self-Management and Personalized Health Advice  The patient has been provided with information about: {MC; PERSONALIZED HEALTH ADVICE:72451} and preventive services including:   · {plan:18169}.    Visit Diagnoses:  No diagnosis found.    No orders of the defined types were placed in this encounter.      Outpatient Encounter Medications as of 2/27/2019   Medication Sig Dispense Refill   • albuterol (PROVENTIL) (2.5 MG/3ML) 0.083% nebulizer solution Take 2.5 mg by nebulization Every 6 (Six) Hours As Needed for Wheezing or Shortness of Air. 100 vial 11   • Ascorbic Acid (VITAMIN C) 500 MG capsule Take  by mouth. Three times weekly     • budesonide-formoterol (SYMBICORT) 80-4.5 MCG/ACT inhaler Inhale 2 puffs Daily. 10.2 g 11   • Calcium Carbonate-Vitamin D (CALCIUM 500 + D PO) Take 2,000 Units by mouth Daily.     • Coenzyme Q10 (COQ-10 PO) Take by mouth.     • fexofenadine (ALLEGRA) 180 MG tablet Take by mouth daily.     • fluticasone (FLONASE) 50 MCG/ACT nasal spray into each nostril.     • MAGNESIUM PO Take  by mouth.     • Multiple Vitamins-Minerals (MULTIVITAMIN WITH MINERALS) tablet Take 1 tablet by mouth daily.       No facility-administered encounter medications on file as of 2/27/2019.        Reviewed use of high risk medication in the elderly: {Response; " yes/no/na:63}  Reviewed for potential of harmful drug interactions in the elderly: {Response; yes/no/na:63}    Follow Up:  No Follow-up on file.     An After Visit Summary and PPPS with all of these plans were given to the patient.

## 2019-02-27 NOTE — PROGRESS NOTES
"[facility] Progress Note    Patient: Agata Do Date: 2017   : 1963 Attending: Blanquita Sheth MD         Admission date: 2017  ICU admit date: 2017  Intubation date: 2017-2017    Chief Complaint: Worsening dyspnea     Summary: Agata Do is a 62year old female with a past medical hx significant for severe COPD, morbid obesity, and obesity hypoventilation syndrome (on Trilogy BiPap at home) who initially presented () with progressively worsening dyspnea x 2-3 days. Ms. Jose is well-known to the [facility] system - this is her third admission this month and seventh this year for same chief complaint. In the ED, ABGs revealed respiratory acidosis and she was placed on BiPap. Admitted to CICU. Intermittently on/off BiPap - stated she ""does not like the mask. \"" On , she developed worsening AMS and became obtunded, eventually requiring intubation for worsening hypercarbic respiratory acidosis. She tolerated extubation .     24hr Events: No acute events overnight. Tolerated BiPAP at HS.     ACUTE PROBLEMS  -- Acute on chronic hypercarbic and hypoxic respiratory failure, improving   -- Altered mental status secondary to above, improving   -- Severe COPD  -- Obesity hypoventilation syndrome, on home Trilogy BiPap  -- History of paroxysmal atrial fibrillation (PAF)  -- Metabolic alkalosis, improving   -- Hypokalemia, resolved   -- Hypervolemia, improved  -- Anemia of chronic disease, unchanged  -- Hyperglycemia  -- Leukocytosis, improving  -- Tobacco use  -- Morbid obesity     ASSESSMENT/PLAN:  Neuro: Awake and alert and oriented x 3. Denies pain currently, but hx of chronic pain on daily opioids prior to admission. Hx anxiety/depression, fibromyalgia, lupus.      -- Continue Cymbalta, Lyrica for non-narcotic pain management  -- Xanax PRN  -- Activity as tolerated, PT/OT    Pulmonary: S/p extubation , intubated for acute on chronic hypoxic " "QUICK REFERENCE INFORMATION:  The ABCs of the Annual Wellness Visit    Subsequent Medicare Wellness Visit    HEALTH RISK ASSESSMENT    1940    Recent Hospitalizations:  {Hospitalization history:[id number]::\"No hospitalization(s) within the last year.\"}.        Current Medical Providers:  Patient Care Team:  Su Castaneda MD as PCP - General (Family Medicine)        Smoking Status:  Social History     Tobacco Use   Smoking Status Former Smoker   • Last attempt to quit:    • Years since quittin.1   Smokeless Tobacco Never Used       Alcohol Consumption:  Social History     Substance and Sexual Activity   Alcohol Use Yes   • Alcohol/week: 1.2 oz   • Types: 2 Glasses of wine per week   • Frequency: 2-4 times a month   • Drinks per session: 1 or 2    Comment: Social use       Depression Screen:   PHQ-2/PHQ-9 Depression Screening 2019   Little interest or pleasure in doing things 0   Feeling down, depressed, or hopeless 0   Total Score 0       Health Habits and Functional and Cognitive Screening:  Functional & Cognitive Status 2019   Do you have difficulty preparing food and eating? No   Do you have difficulty bathing yourself, getting dressed or grooming yourself? No   Do you have difficulty using the toilet? No   Do you have difficulty moving around from place to place? No   Do you have trouble with steps or getting out of a bed or a chair? -   In the past year have you fallen or experienced a near fall? -   Current Diet Well Balanced Diet   Dental Exam Up to date   Eye Exam Up to date   Exercise (times per week) 7 times per week   Current Exercise Activities Include Yoga   Do you need help using the phone?  No   Are you deaf or do you have serious difficulty hearing?  No   Do you need help with transportation? No   Do you need help shopping? No   Do you need help preparing meals?  No   Do you need help with housework?  No   Do you need help with laundry? No   Do you need help taking your " "medications? No   Do you need help managing money? No   Do you ever drive or ride in a car without wearing a seat belt? No   Have you felt unusual stress, anger or loneliness in the last month? No   Who do you live with? Spouse   If you need help, do you have trouble finding someone available to you? No   Have you been bothered in the last four weeks by sexual problems? No   Do you have difficulty concentrating, remembering or making decisions? No           Does the patient have evidence of cognitive impairment? {Yes/No w/ pre-defaulted No:94061::\"No\"}    Aspirin use counseling: {Aspirin :22258}      Recent Lab Results:  CMP:  Lab Results   Component Value Date     (H) 02/15/2019    BUN 14 02/15/2019    CREATININE 0.88 02/15/2019    EGFRIFNONA 62 02/15/2019    EGFRIFAFRI 72 02/15/2019    BCR 16 02/15/2019     02/15/2019    K 4.7 02/15/2019    CO2 24 02/15/2019    CALCIUM 9.6 02/15/2019    PROTENTOTREF 7.2 02/15/2019    ALBUMIN 4.6 02/15/2019    LABGLOBREF 2.6 02/15/2019    LABIL2 1.8 02/15/2019    BILITOT 0.4 02/15/2019    ALKPHOS 58 02/15/2019    AST 26 02/15/2019    ALT 19 02/15/2019     Lipid Panel:  Lab Results   Component Value Date    TRIG 60 02/15/2019    HDL 87 02/15/2019    VLDL 12 02/15/2019    LDLHDL 2.10 02/28/2018     HbA1c:  Lab Results   Component Value Date    HGBA1C 5.8 (H) 02/15/2019       Visual Acuity:  No exam data present    Age-appropriate Screening Schedule:  Refer to the list below for future screening recommendations based on patient's age, sex and/or medical conditions. Orders for these recommended tests are listed in the plan section. The patient has been provided with a written plan.    Health Maintenance   Topic Date Due   • TDAP/TD VACCINES (1 - Tdap) 05/03/1957   • ZOSTER VACCINE (1 of 2) 05/03/1988   • MAMMOGRAM  04/17/2019   • LIPID PANEL  02/15/2020   • INFLUENZA VACCINE  Completed   • PNEUMOCOCCAL VACCINES (65+ LOW/MEDIUM RISK)  Completed        Subjective " and hypercarbic respiratory failure, now improved. CXR 9/30 essentially unchanged L pleural effusion. Severe COPD with chronic CO2 retention - baseline bicarb 38-40, pCO2 70s-80s. Obesity hypoventilation syndrome, on home Trilogy BiPap (not likely compliant). Tobacco use, smokes 2 PPD. Tolerated and was compliant with BiPAP. -- Continue BiPAP @ HS   -- Will likely need AVAPS at time of extubation  -- Scheduled DuoNebs   -- Nicotine patch  -- Continue home flonase   -- Resume PTA advair, tiotropium    -- Minimal FiO2 to maintain POX 88-92%  -- Follows with Dr. Estella Hurst outpatient     CV: SR, still on minimal Levophed, suspect hypotension related to sedation. Hx PAF. Echo (9/26) with elevated RVSP (58.5 mmHg), grade I/IV diastolic dysfunction with mildly elevated filling pressures, EF 70%. -- Stop levophed gtt  -- Consider discontinuing A-line  -- Assess orthostatics: may be overdiuresed  -- Continue bASA, statin  -- Cardizem with hold parameters  -- Continue Xarelto for A-fib  -- Hold home Losartan, Coreg (unclear etiology for this, would prefer metoprolol as better agent for rate control)    Renal: Renal function preserved, no renal dysfunction at baseline. Contraction alkalosis improved, baseline bicarb appears to be 38-42. Persistent hypokalemia (3.2) despite aggressive supplementation, secondary to diuresis. Now appears uvolemic. Overall net -14.2L since admission. -- Potassium supplementation: increase scheduled and give additional (total 100meq today)  -- Increase spironolactone to 50 mg daily  -- Continue Metolazone 2.5mg BID  -- Monitor & supplement electrolytes  -- Discontinue gaona     GI: No acute issues. Hx GERD. LBM 9/24. Tolerating PO well. -- Miralax daily  -- Speech therapy to evaluate swallow  -- Continue CC diet until ST eval  -- Consult dietician for weight loss plan  -- Dietary following     Heme: Anemia of chronic disease, H/H stable.  Thrombocytes normal. No overt signs of bleeding on exam. "  History of Present Illness    Jenniffer Dumont is a 78 y.o. female who presents for an Subsequent Wellness Visit.    The following portions of the patient's history were reviewed and updated as appropriate: {history reviewed:20406::\"allergies\",\"current medications\",\"past family history\",\"past medical history\",\"past social history\",\"past surgical history\",\"problem list\"}.    Outpatient Medications Prior to Visit   Medication Sig Dispense Refill   • albuterol (PROVENTIL) (2.5 MG/3ML) 0.083% nebulizer solution Take 2.5 mg by nebulization Every 6 (Six) Hours As Needed for Wheezing or Shortness of Air. 100 vial 11   • Ascorbic Acid (VITAMIN C) 500 MG capsule Take  by mouth. Three times weekly     • budesonide-formoterol (SYMBICORT) 80-4.5 MCG/ACT inhaler Inhale 2 puffs Daily. 10.2 g 11   • Calcium Carbonate-Vitamin D (CALCIUM 500 + D PO) Take 2,000 Units by mouth Daily.     • Coenzyme Q10 (COQ-10 PO) Take by mouth.     • fexofenadine (ALLEGRA) 180 MG tablet Take by mouth daily.     • fluticasone (FLONASE) 50 MCG/ACT nasal spray into each nostril.     • MAGNESIUM PO Take  by mouth.     • Multiple Vitamins-Minerals (MULTIVITAMIN WITH MINERALS) tablet Take 1 tablet by mouth daily.       No facility-administered medications prior to visit.        Patient Active Problem List   Diagnosis   • Bronchiectasis without complication (CMS/[facility])   • Cough   • HLD (hyperlipidemia)   • MARIAMA (mycobacterium avium-intracellulare) (CMS/[facility])   • Allergic sinusitis   • History of melanoma       Advance Care Planning:  {Advance Directive Status:04340}    Identification of Risk Factors:  Risk factors include: {; WELLNESS RISK FACTORS:57546}.    Review of Systems   HENT: Positive for hearing loss.    Eyes: Negative.    Respiratory: Negative.    Cardiovascular: Negative.    Gastrointestinal: Negative.    Endocrine: Negative.    Genitourinary: Negative.    Musculoskeletal: Positive for arthralgias.   Allergic/Immunologic: Positive for " Endocrine: Hyperglycemia, likely reactive. No hx of DM, last HgA1C 5.7 (9/26). No known thyroid dysfunction. -- Continue SSI     ID: Afebrile, leukocytosis improved, down to 13 from 20. Urine (9/24) with +nitrates, leuk esterase and few bacteria - however no culture obtained. Received 1 dose of levaquin in ED and 1 dose Fosfomycin (9/25). PCT 0.08. MRSA + nares. Respiratory pathogen panel (9/27) negative. Sputum CS 9/29 pending.  -- Continue to monitor off abx     Disposition: Maintain in ICU during wean off pressors. BEST PRACTICES:  - VTE prophylaxis: SCDs, Xarelto   - SUP: Nexium   - Glycemic control: SSI  - LDA: R radial art line (9/25), PIV, PICC (9/27)  - Nutrition: TF  - Therapy/mobilization: As tolerated, PT/OT    ================================================================    Subjective: Feels good. Wants her food ASAP. Denies pain, SOB. I/O last 3 completed shifts: In: 1580 [P.O.:507; I.V.:682; NG/GT:186]  Out: 2150 [Urine:2150]  No intake/output data recorded. Vital Last Value 24 Hour Range   Temperature 98.9 Â°F (37.2 Â°C) (09/29/17 1900) Temp  Min: 98.9 Â°F (37.2 Â°C)  Max: 99.7 Â°F (37.6 Â°C)   Pulse 68 (09/30/17 0346) Pulse  Min: 68  Max: 100   Respiratory 22 (09/30/17 0346) Resp  Min: 19  Max: 35   Non-Invasive  Blood Pressure 100/59 (09/30/17 0000) BP  Min: 84/57  Max: 129/89   Pulse Oximetry 100 % (09/30/17 0346) SpO2  Min: 88 %  Max: 100 %   Arterial   Blood Pressure   No Data Recorded      Physical Exam  General: NAD. Morbidly obese female. Neuro: AAO x 3. Moves all extremities spontaneously. HEENT: Normocephalic, atraumatic. PERRL. Neck: Supple, no JVD   Chest: Symmetrical with respirations. Breath sounds diminished throughout d/t body habitus. Heart: RRR, normal S1/S2. Distant tones d/t body habitus. No m/r/g. Abdomen: Obese. Semi-firm, rounded. NT/ND. Hypoactive bowel sounds x4. Extremities: Warm, perfused. Mild generalized edema.    Pulses: +2 radial and +1 pedal "environmental allergies.   Neurological: Positive for dizziness.        VERTIGO   Hematological: Negative.    Psychiatric/Behavioral: Negative.        Compared to one year ago, the patient feels her physical health is {better worse same:83830}.  Compared to one year ago, the patient feels her mental health is {better worse same:98389}.    Objective     Physical Exam    Vitals:    02/27/19 0945   BP: 122/76   Pulse: 66   Temp: 98 °F (36.7 °C)   TempSrc: Oral   SpO2: 97%   Weight: 61.2 kg (135 lb)   Height: 154.9 cm (61\")   PainSc: 0-No pain       Patient's Body mass index is 25.51 kg/m². BMI is {BMI range:22757}.      Assessment/Plan   Patient Self-Management and Personalized Health Advice  The patient has been provided with information about: {; PERSONALIZED HEALTH ADVICE:19593} and preventive services including:   · {plan:97693}.    Visit Diagnoses:    ICD-10-CM ICD-9-CM   1. Insulin resistance E88.81 277.7       No orders of the defined types were placed in this encounter.      Outpatient Encounter Medications as of 2/27/2019   Medication Sig Dispense Refill   • albuterol (PROVENTIL) (2.5 MG/3ML) 0.083% nebulizer solution Take 2.5 mg by nebulization Every 6 (Six) Hours As Needed for Wheezing or Shortness of Air. 100 vial 11   • Ascorbic Acid (VITAMIN C) 500 MG capsule Take  by mouth. Three times weekly     • budesonide-formoterol (SYMBICORT) 80-4.5 MCG/ACT inhaler Inhale 2 puffs Daily. 10.2 g 11   • Calcium Carbonate-Vitamin D (CALCIUM 500 + D PO) Take 2,000 Units by mouth Daily.     • Coenzyme Q10 (COQ-10 PO) Take by mouth.     • fexofenadine (ALLEGRA) 180 MG tablet Take by mouth daily.     • fluticasone (FLONASE) 50 MCG/ACT nasal spray into each nostril.     • MAGNESIUM PO Take  by mouth.     • Multiple Vitamins-Minerals (MULTIVITAMIN WITH MINERALS) tablet Take 1 tablet by mouth daily.       No facility-administered encounter medications on file as of 2/27/2019.        Reviewed use of high risk medication in " pulses. Skin: Pallor. Warm, dry, intact. Scattered erythema. Pertinent Reviewed: Allergies, Medications, Labs, Imaging and Physician and Nursing Notes    ACCS Attestation  This patient is ill as documented above. I saw and evaluated the patient with Dr. Efe Magallanes and reviewed imaging and laboratory data. Together, we formulated the diagnostic and therapeutic strategy documented above. Services I provided 28031 ([facility] care, level III).     Anil Herrera, [address] [facility],[address] [facility] Critical Care Service  Pager: [phone] the elderly: {Response; yes/no/na:63}  Reviewed for potential of harmful drug interactions in the elderly: {Response; yes/no/na:63}    Follow Up:  No Follow-up on file.     An After Visit Summary and PPPS with all of these plans were given to the patient.

## 2019-02-27 NOTE — PROGRESS NOTES
Jenniffer Dumont is a 78 y.o. female.     Chief Complaint   Patient presents with   • Hyperglycemia     elevated on labs, new problem       HPI      Pt is a pleasant 78 y.o. YO female here for Hyperglycemia, congestion and vertigo.  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    Patient with new problem of hyperglycemia - worsening diet with eating chocolates, has gained some weight with diet changes, not exercising currently.  She has done low-carb diets in the past, is [place] and enjoys pasta greatly.  She does try to eat smaller portion sizes.    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  Overall she feels stable, still producing a clear and sticky mucus.  Coughing most of the morning with thicker mucus.  She does see an infectious disease doctor in [facility].    Vertigo worsening, tends to occur in the winter months when congestion is worse.  Episodic, mostly when doing yoga or activities that cause her to move her head around.  Not present currently.  Not wanting to take a medication.    The following portions of the patient's history were reviewed and updated as appropriate: allergies, current medications, past family history, past medical history, past social history, past surgical history and problem list.    Review of Systems   Constitutional: Negative for fever and unexpected weight change.   HENT: Positive for congestion. Negative for dental problem.    Respiratory: Positive for cough. Negative for shortness of breath.    Cardiovascular: Negative for chest pain.   Gastrointestinal: Negative for blood in stool.   Genitourinary: Negative for dysuria.   Skin: Negative for rash.   Allergic/Immunologic: Negative for environmental allergies.   Neurological: Negative for syncope.   Psychiatric/Behavioral: The patient is not nervous/anxious.        Objective  Vitals:    02/27/19 0945    BP: 122/76   Pulse: 66   Temp: 98 °F (36.7 °C)   SpO2: 97%        Physical Exam   Constitutional: She is oriented to person, place, and time. She appears well-developed and well-nourished. No distress.   HENT:   Head: Normocephalic.   Nose: Nose normal.   Eyes: EOM are normal.   Cardiovascular: Normal rate, regular rhythm, normal heart sounds and intact distal pulses.   No murmur heard.  Pulmonary/Chest: Effort normal. No respiratory distress. She has rales.   Musculoskeletal: Normal range of motion.   Neurological: She is alert and oriented to person, place, and time.   Skin: Skin is warm and dry. No rash noted.   Psychiatric: She has a normal mood and affect. Her behavior is normal. Judgment and thought content normal.   Nursing note and vitals reviewed.        Current Outpatient Medications:   •  albuterol (PROVENTIL) (2.5 MG/3ML) 0.083% nebulizer solution, Take 2.5 mg by nebulization Every 6 (Six) Hours As Needed for Wheezing or Shortness of Air., Disp: 100 vial, Rfl: 11  •  Ascorbic Acid (VITAMIN C) 500 MG capsule, Take  by mouth. Three times weekly, Disp: , Rfl:   •  budesonide-formoterol (SYMBICORT) 80-4.5 MCG/ACT inhaler, Inhale 2 puffs Daily., Disp: 10.2 g, Rfl: 11  •  Calcium Carbonate-Vitamin D (CALCIUM 500 + D PO), Take 2,000 Units by mouth Daily., Disp: , Rfl:   •  Coenzyme Q10 (COQ-10 PO), Take by mouth., Disp: , Rfl:   •  fexofenadine (ALLEGRA) 180 MG tablet, Take by mouth daily., Disp: , Rfl:   •  fluticasone (FLONASE) 50 MCG/ACT nasal spray, into each nostril., Disp: , Rfl:   •  MAGNESIUM PO, Take  by mouth., Disp: , Rfl:   •  Multiple Vitamins-Minerals (MULTIVITAMIN WITH MINERALS) tablet, Take 1 tablet by mouth daily., Disp: , Rfl:     Procedures    Lab Results (most recent)     None              Jenniffer was seen today for hyperglycemia.    Diagnoses and all orders for this visit:    Insulin resistance    Bronchiectasis without complication (CMS/HCC)    Vertigo    Patient with new problem of  insulin resistance, worsening, discussed diet, limit carbs to 45 carbs per meal.  Patient willing.  Follow-up hemoglobin A1c in 6 months with Medicare wellness visit.    Bronchiectasis with history of MAC infection, stabllecompleted rifampin this summer.  Okay to add guaifenesin 600 mg twice daily with a large glass of water to thin the case and make it easier to clear.  Continue follow-up with infectious disease at [facility].    Vertigo episodic, well controlled, handout for Epley maneuver given.  She does not want medications if possible.  Discussed treating congestion in sinuses to prevent future episodes.    Return if symptoms worsen or fail to improve, for Medicare Wellness.      Su Castaneda MD

## 2019-05-21 DIAGNOSIS — J47.9 BRONCHIECTASIS WITHOUT COMPLICATION (HCC): ICD-10-CM

## 2019-05-21 DIAGNOSIS — A31.0 MAI (MYCOBACTERIUM AVIUM-INTRACELLULARE) (HCC): ICD-10-CM

## 2019-05-21 DIAGNOSIS — R05.9 COUGH: ICD-10-CM

## 2019-05-21 RX ORDER — ALBUTEROL SULFATE 2.5 MG/3ML
2.5 SOLUTION RESPIRATORY (INHALATION) EVERY 6 HOURS PRN
Qty: 100 VIAL | Refills: 11 | Status: SHIPPED | OUTPATIENT
Start: 2019-05-21 | End: 2020-03-12 | Stop reason: SDUPTHER

## 2019-10-07 ENCOUNTER — OFFICE VISIT (OUTPATIENT)
Dept: FAMILY MEDICINE CLINIC | Facility: CLINIC | Age: 79
End: 2019-10-07

## 2019-10-07 VITALS
SYSTOLIC BLOOD PRESSURE: 126 MMHG | OXYGEN SATURATION: 98 % | WEIGHT: 134 LBS | TEMPERATURE: 98.6 F | HEART RATE: 96 BPM | BODY MASS INDEX: 25.3 KG/M2 | DIASTOLIC BLOOD PRESSURE: 80 MMHG | HEIGHT: 61 IN

## 2019-10-07 DIAGNOSIS — R73.9 HYPERGLYCEMIA: Primary | ICD-10-CM

## 2019-10-07 DIAGNOSIS — A31.0 MAI (MYCOBACTERIUM AVIUM-INTRACELLULARE) (HCC): ICD-10-CM

## 2019-10-07 DIAGNOSIS — K21.9 GASTROESOPHAGEAL REFLUX DISEASE, ESOPHAGITIS PRESENCE NOT SPECIFIED: ICD-10-CM

## 2019-10-07 LAB — HBA1C MFR BLD: 6.2 %

## 2019-10-07 PROCEDURE — 83036 HEMOGLOBIN GLYCOSYLATED A1C: CPT | Performed by: FAMILY MEDICINE

## 2019-10-07 PROCEDURE — 99214 OFFICE O/P EST MOD 30 MIN: CPT | Performed by: FAMILY MEDICINE

## 2019-10-07 RX ORDER — ESOMEPRAZOLE MAGNESIUM 40 MG/1
40 CAPSULE, DELAYED RELEASE ORAL
COMMUNITY

## 2019-10-07 NOTE — PROGRESS NOTES
Jenniffer Dumont is a 79 y.o. female.     Chief Complaint   Patient presents with   • Hyperglycemia     follow up prediabetes range today no complains   • Heartburn     gassy and blating almost every day        HPI     Pt is a pleasant 79 y.o. YO female here for hyperglycemia and GERD, now off antibiotics for MARIAMA followed by Pulm in [facility].    Patient with diagnosis of hyperglycemia February 2019 with hemoglobin A1c of 5.8 that is now worsening.  She started with lifestyle modification, she has been more social and eating out.  She has been eating ice cream nightly.     GERD: told to sleep on a wedge pillow.  She now has gas and bloating more than ever before.  One glass of wine occasionally. And coffee each AM.     Bronchiectasis with hx of MARIAMA that has completed antibiotic treatment.  She does get cough and has forgotten to take her nebulizer with albuterol.     The following portions of the patient's history were reviewed and updated as appropriate: allergies, current medications, past family history, past medical history, past social history, past surgical history and problem list.    Review of Systems   Constitutional: Negative.    HENT: Negative.    Eyes: Negative.    Respiratory: Negative.    Cardiovascular: Negative.    Gastrointestinal: Positive for abdominal distention.        Gassy gerd   Endocrine: Negative.    Allergic/Immunologic: Negative.    Neurological: Negative.    Hematological: Negative.    Psychiatric/Behavioral: Negative.        Objective  Vitals:    10/07/19 1120   BP: 126/80   Pulse: 96   Temp: 98.6 °F (37 °C)   SpO2: 98%        Physical Exam   Constitutional: She is oriented to person, place, and time. She appears well-developed and well-nourished. No distress.   HENT:   Head: Normocephalic.   Nose: Nose normal.   Eyes: EOM are normal.   Cardiovascular: Normal rate, regular rhythm, normal heart sounds and intact distal pulses.   No murmur heard.  Pulmonary/Chest: Effort normal  and breath sounds normal. No respiratory distress.   Musculoskeletal: Normal range of motion.   Neurological: She is alert and oriented to person, place, and time.   Skin: Skin is warm and dry. No rash noted.   Psychiatric: She has a normal mood and affect. Her behavior is normal. Judgment and thought content normal.   Nursing note and vitals reviewed.        Current Outpatient Medications:   •  albuterol (PROVENTIL) (2.5 MG/3ML) 0.083% nebulizer solution, Take 2.5 mg by nebulization Every 6 (Six) Hours As Needed for Wheezing or Shortness of Air., Disp: 100 vial, Rfl: 11  •  Ascorbic Acid (VITAMIN C) 500 MG capsule, Take  by mouth. Three times weekly, Disp: , Rfl:   •  budesonide-formoterol (SYMBICORT) 80-4.5 MCG/ACT inhaler, Inhale 2 puffs Daily., Disp: 10.2 g, Rfl: 11  •  Calcium Carbonate-Vitamin D (CALCIUM 500 + D PO), Take 2,000 Units by mouth Daily., Disp: , Rfl:   •  Coenzyme Q10 (COQ-10 PO), Take by mouth., Disp: , Rfl:   •  esomeprazole (nexIUM) 40 MG capsule, Take 40 mg by mouth Every Morning Before Breakfast., Disp: , Rfl:   •  fexofenadine (ALLEGRA) 180 MG tablet, Take by mouth daily., Disp: , Rfl:   •  fluticasone (FLONASE) 50 MCG/ACT nasal spray, into each nostril., Disp: , Rfl:   •  MAGNESIUM PO, Take  by mouth., Disp: , Rfl:   •  Multiple Vitamins-Minerals (MULTIVITAMIN WITH MINERALS) tablet, Take 1 tablet by mouth daily., Disp: , Rfl:     Procedures    Lab Results (most recent)     None        Office Visit on 10/07/2019   Component Date Value Ref Range Status   • Hemoglobin A1C 10/07/2019 6.2  % Final           Jenniffer was seen today for hyperglycemia and heartburn.    Diagnoses and all orders for this visit:    Hyperglycemia  -     POC Glycosylated Hemoglobin (Hb A1C)    Gastroesophageal reflux disease, esophagitis presence not specified  -     Ambulatory Referral to Gastroenterology    MARIAMA (mycobacterium avium-intracellulare) (CMS/[facility])      Hyperglycemia worsening, HbA1c worsened from 5.8 - 6.2  today.  Increase exercise, decrease nightly ice cream.    GERD not well controlled, unable to wean off PPI.  Now having significant bloating and epigastric pain.  She likely would benefit from getting EGD with GI.  She has been on long-term antibiotics for MARIAMA.    MARIAMA has improved significantly, she has not been taking her albuterol.  Discussed that this will likely help her cough.  Bronchiectasis otherwise seems to be stable.  Oxygen normal.  She has been discharged from [facility] now.    Return in about 3 months (around 1/7/2020), or if symptoms worsen or fail to improve, for Recheck Hyperglycemia and medicare wellness  in 6 months .      Su Castaneda MD

## 2020-01-15 DIAGNOSIS — R05.9 COUGH: ICD-10-CM

## 2020-01-15 DIAGNOSIS — A31.0 MAI (MYCOBACTERIUM AVIUM-INTRACELLULARE) (HCC): ICD-10-CM

## 2020-01-15 DIAGNOSIS — J47.9 BRONCHIECTASIS WITHOUT COMPLICATION (HCC): ICD-10-CM

## 2020-01-15 RX ORDER — BUDESONIDE AND FORMOTEROL FUMARATE DIHYDRATE 80; 4.5 UG/1; UG/1
2 AEROSOL RESPIRATORY (INHALATION) DAILY
Qty: 10.2 G | Refills: 11 | Status: SHIPPED | OUTPATIENT
Start: 2020-01-15 | End: 2020-01-30 | Stop reason: SDUPTHER

## 2020-01-22 ENCOUNTER — OFFICE VISIT (OUTPATIENT)
Dept: FAMILY MEDICINE CLINIC | Facility: CLINIC | Age: 80
End: 2020-01-22

## 2020-01-22 VITALS
WEIGHT: 134 LBS | BODY MASS INDEX: 25.3 KG/M2 | DIASTOLIC BLOOD PRESSURE: 78 MMHG | TEMPERATURE: 98.1 F | HEIGHT: 61 IN | OXYGEN SATURATION: 99 % | SYSTOLIC BLOOD PRESSURE: 126 MMHG | HEART RATE: 78 BPM

## 2020-01-22 DIAGNOSIS — J47.9 BRONCHIECTASIS WITHOUT COMPLICATION (HCC): ICD-10-CM

## 2020-01-22 DIAGNOSIS — R73.9 HYPERGLYCEMIA: Primary | ICD-10-CM

## 2020-01-22 LAB — HBA1C MFR BLD: 5.8 %

## 2020-01-22 PROCEDURE — 83036 HEMOGLOBIN GLYCOSYLATED A1C: CPT | Performed by: FAMILY MEDICINE

## 2020-01-22 PROCEDURE — 99213 OFFICE O/P EST LOW 20 MIN: CPT | Performed by: FAMILY MEDICINE

## 2020-01-22 NOTE — PROGRESS NOTES
Jenniffer Dumont is a 79 y.o. female.     Chief Complaint   Patient presents with   • Hyperglycemia     3 month follow up on a1c pt making best changes is diet        HPI     Pt is a pleasant 79 y.o. YO female here for Hyperglycemia. Well controlled and improved.  Decreased ice cream, wine and increased exercise to 4 days a week.      The following portions of the patient's history were reviewed and updated as appropriate: allergies, current medications, past family history, past medical history, past social history, past surgical history and problem list.    Review of Systems   Constitutional: Negative.    HENT: Negative.    Eyes: Negative.    Respiratory: Negative.    Cardiovascular: Negative for chest pain, palpitations and leg swelling.   Endocrine: Negative.    Genitourinary: Negative.    Musculoskeletal: Negative.    Skin: Negative.    Allergic/Immunologic: Positive for environmental allergies.   Neurological: Negative.    Hematological: Negative.    Psychiatric/Behavioral: Negative.        Objective  Vitals:    01/22/20 0927   BP: 126/78   Pulse: 78   Temp: 98.1 °F (36.7 °C)   SpO2: 99%        Physical Exam   Constitutional: She is oriented to person, place, and time. She appears well-developed and well-nourished. No distress.   HENT:   Head: Normocephalic.   Nose: Nose normal.   Eyes: EOM are normal. No scleral icterus.   Pulmonary/Chest: Effort normal. No respiratory distress.   Musculoskeletal: Normal range of motion.   Neurological: She is alert and oriented to person, place, and time.   Skin: Skin is warm and dry. No rash noted.   Psychiatric: She has a normal mood and affect. Her behavior is normal. Judgment and thought content normal.   Nursing note and vitals reviewed.        Current Outpatient Medications:   •  albuterol (PROVENTIL) (2.5 MG/3ML) 0.083% nebulizer solution, Take 2.5 mg by nebulization Every 6 (Six) Hours As Needed for Wheezing or Shortness of Air., Disp: 100 vial, Rfl: 11  •   Ascorbic Acid (VITAMIN C) 500 MG capsule, Take  by mouth. Three times weekly, Disp: , Rfl:   •  budesonide-formoterol (SYMBICORT) 80-4.5 MCG/ACT inhaler, Inhale 2 puffs Daily., Disp: 10.2 g, Rfl: 11  •  Calcium Carbonate-Vitamin D (CALCIUM 500 + D PO), Take 2,000 Units by mouth Daily., Disp: , Rfl:   •  Coenzyme Q10 (COQ-10 PO), Take by mouth., Disp: , Rfl:   •  fexofenadine (ALLEGRA) 180 MG tablet, Take by mouth daily., Disp: , Rfl:   •  fluticasone (FLONASE) 50 MCG/ACT nasal spray, into each nostril., Disp: , Rfl:   •  MAGNESIUM PO, Take  by mouth., Disp: , Rfl:   •  Multiple Vitamins-Minerals (MULTIVITAMIN WITH MINERALS) tablet, Take 1 tablet by mouth daily., Disp: , Rfl:   •  esomeprazole (nexIUM) 40 MG capsule, Take 40 mg by mouth Every Morning Before Breakfast., Disp: , Rfl:     Procedures    Lab Results (most recent)     None        Office Visit on 01/22/2020   Component Date Value Ref Range Status   • Hemoglobin A1C 01/22/2020 5.8  % Final           Jenniffer was seen today for hyperglycemia.    Diagnoses and all orders for this visit:    Hyperglycemia  -     POC Glycosylated Hemoglobin (Hb A1C)    Bronchiectasis without complication (CMS/[facility])    Pleasant 79-year-old female here for hyperglycemia follow-up, hemoglobin A1c improved from 6.3-5.8.  Continue with lifestyle changes.  Follow-up in 6 months.    Bronchiectasis, continue Symbicort.  She has stopped seeing [facility] for MARIAMA.      Return in about 6 months (around 7/22/2020), or if symptoms worsen or fail to improve, for Recheck Hyperglycemia , Medicare Wellness.      Su Castaneda MD

## 2020-01-30 DIAGNOSIS — J47.9 BRONCHIECTASIS WITHOUT COMPLICATION (HCC): ICD-10-CM

## 2020-01-30 DIAGNOSIS — R05.9 COUGH: ICD-10-CM

## 2020-01-30 DIAGNOSIS — A31.0 MAI (MYCOBACTERIUM AVIUM-INTRACELLULARE) (HCC): ICD-10-CM

## 2020-01-30 RX ORDER — BUDESONIDE AND FORMOTEROL FUMARATE DIHYDRATE 80; 4.5 UG/1; UG/1
2 AEROSOL RESPIRATORY (INHALATION) DAILY
Qty: 10.2 G | Refills: 11 | Status: SHIPPED | OUTPATIENT
Start: 2020-01-30 | End: 2021-02-03 | Stop reason: SDUPTHER

## 2020-02-17 ENCOUNTER — TELEPHONE (OUTPATIENT)
Dept: FAMILY MEDICINE CLINIC | Facility: CLINIC | Age: 80
End: 2020-02-17

## 2020-02-17 DIAGNOSIS — H91.93 HEARING DECREASED, BILATERAL: Primary | ICD-10-CM

## 2020-02-17 NOTE — TELEPHONE ENCOUNTER
Patient got a hearing test done at [facility] and they told her that it would be best for her to go see an ENT the results are scanned into patients chart and they stated that they told her that her hearing has significantly decreased. Patient is requesting we refer her to ENT if possible.

## 2020-03-03 ENCOUNTER — TELEPHONE (OUTPATIENT)
Dept: FAMILY MEDICINE CLINIC | Facility: CLINIC | Age: 80
End: 2020-03-03

## 2020-03-03 NOTE — TELEPHONE ENCOUNTER
Okay to overbook patient onto my schedule tomorrow.  Thank you     please call patient to make appointment.

## 2020-03-03 NOTE — TELEPHONE ENCOUNTER
Yesterday pt had fever, chest tightness and  Productive cough and sob she is not sure if needs xray is afraid with her health  history MARIAMA  She may have pneumonia

## 2020-03-04 ENCOUNTER — HOSPITAL ENCOUNTER (OUTPATIENT)
Dept: GENERAL RADIOLOGY | Facility: HOSPITAL | Age: 80
Discharge: HOME | End: 2020-03-04
Admitting: FAMILY MEDICINE

## 2020-03-04 ENCOUNTER — APPOINTMENT (OUTPATIENT)
Dept: GENERAL RADIOLOGY | Facility: HOSPITAL | Age: 80
End: 2020-03-04

## 2020-03-04 ENCOUNTER — OFFICE VISIT (OUTPATIENT)
Dept: FAMILY MEDICINE CLINIC | Facility: CLINIC | Age: 80
End: 2020-03-04

## 2020-03-04 ENCOUNTER — HOSPITAL ENCOUNTER (INPATIENT)
Facility: HOSPITAL | Age: 80
LOS: 3 days | Discharge: HOME | End: 2020-03-08
Attending: EMERGENCY MEDICINE | Admitting: INTERNAL MEDICINE

## 2020-03-04 VITALS
HEART RATE: 100 BPM | BODY MASS INDEX: 25.11 KG/M2 | DIASTOLIC BLOOD PRESSURE: 64 MMHG | TEMPERATURE: 98.1 F | HEIGHT: 61 IN | WEIGHT: 133 LBS | OXYGEN SATURATION: 97 % | SYSTOLIC BLOOD PRESSURE: 110 MMHG

## 2020-03-04 DIAGNOSIS — J47.9 BRONCHIECTASIS WITHOUT COMPLICATION (HCC): ICD-10-CM

## 2020-03-04 DIAGNOSIS — R68.89 FLU-LIKE SYMPTOMS: Primary | ICD-10-CM

## 2020-03-04 DIAGNOSIS — E87.1 ACUTE HYPONATREMIA: ICD-10-CM

## 2020-03-04 DIAGNOSIS — A31.0 MAI (MYCOBACTERIUM AVIUM-INTRACELLULARE) (HCC): ICD-10-CM

## 2020-03-04 DIAGNOSIS — J18.9 PNEUMONIA DUE TO INFECTIOUS ORGANISM, UNSPECIFIED LATERALITY, UNSPECIFIED PART OF LUNG: Primary | ICD-10-CM

## 2020-03-04 DIAGNOSIS — R05.9 COUGH: ICD-10-CM

## 2020-03-04 LAB
ALBUMIN SERPL-MCNC: 4.2 G/DL (ref 3.5–5.2)
ALBUMIN/GLOB SERPL: 1.4 G/DL
ALP SERPL-CCNC: 68 U/L (ref 39–117)
ALT SERPL W P-5'-P-CCNC: 22 U/L (ref 1–33)
ANION GAP SERPL CALCULATED.3IONS-SCNC: 14.4 MMOL/L (ref 5–15)
AST SERPL-CCNC: 37 U/L (ref 1–32)
B PARAPERT DNA SPEC QL NAA+PROBE: NOT DETECTED
B PERT DNA SPEC QL NAA+PROBE: NOT DETECTED
BASOPHILS # BLD AUTO: 0.09 10*3/MM3 (ref 0–0.2)
BASOPHILS NFR BLD AUTO: 0.6 % (ref 0–1.5)
BILIRUB SERPL-MCNC: 0.7 MG/DL (ref 0.2–1.2)
BUN BLD-MCNC: 12 MG/DL (ref 8–23)
BUN/CREAT SERPL: 14.6 (ref 7–25)
C PNEUM DNA NPH QL NAA+NON-PROBE: NOT DETECTED
CALCIUM SPEC-SCNC: 9.1 MG/DL (ref 8.6–10.5)
CHLORIDE SERPL-SCNC: 92 MMOL/L (ref 98–107)
CO2 SERPL-SCNC: 24.6 MMOL/L (ref 22–29)
CREAT BLD-MCNC: 0.82 MG/DL (ref 0.57–1)
D-LACTATE SERPL-SCNC: 1.1 MMOL/L (ref 0.5–2)
DEPRECATED RDW RBC AUTO: 39.8 FL (ref 37–54)
EOSINOPHIL # BLD AUTO: 0.02 10*3/MM3 (ref 0–0.4)
EOSINOPHIL NFR BLD AUTO: 0.1 % (ref 0.3–6.2)
ERYTHROCYTE [DISTWIDTH] IN BLOOD BY AUTOMATED COUNT: 12.2 % (ref 12.3–15.4)
EXPIRATION DATE: NORMAL
FLUAV AG NPH QL: NEGATIVE
FLUAV H1 2009 PAND RNA NPH QL NAA+PROBE: NOT DETECTED
FLUAV H1 HA GENE NPH QL NAA+PROBE: NOT DETECTED
FLUAV H3 RNA NPH QL NAA+PROBE: NOT DETECTED
FLUAV SUBTYP SPEC NAA+PROBE: NOT DETECTED
FLUBV AG NPH QL: NEGATIVE
FLUBV RNA ISLT QL NAA+PROBE: NOT DETECTED
GFR SERPL CREATININE-BSD FRML MDRD: 67 ML/MIN/1.73
GLOBULIN UR ELPH-MCNC: 3.1 GM/DL
GLUCOSE BLD-MCNC: 119 MG/DL (ref 65–99)
HADV DNA SPEC NAA+PROBE: NOT DETECTED
HCOV 229E RNA SPEC QL NAA+PROBE: NOT DETECTED
HCOV HKU1 RNA SPEC QL NAA+PROBE: NOT DETECTED
HCOV NL63 RNA SPEC QL NAA+PROBE: NOT DETECTED
HCOV OC43 RNA SPEC QL NAA+PROBE: NOT DETECTED
HCT VFR BLD AUTO: 35.5 % (ref 34–46.6)
HGB BLD-MCNC: 12.1 G/DL (ref 12–15.9)
HMPV RNA NPH QL NAA+NON-PROBE: NOT DETECTED
HOLD SPECIMEN: NORMAL
HOLD SPECIMEN: NORMAL
HPIV1 RNA SPEC QL NAA+PROBE: NOT DETECTED
HPIV2 RNA SPEC QL NAA+PROBE: NOT DETECTED
HPIV3 RNA NPH QL NAA+PROBE: NOT DETECTED
HPIV4 P GENE NPH QL NAA+PROBE: NOT DETECTED
IMM GRANULOCYTES # BLD AUTO: 0.12 10*3/MM3 (ref 0–0.05)
IMM GRANULOCYTES NFR BLD AUTO: 0.8 % (ref 0–0.5)
INTERNAL CONTROL: NORMAL
LYMPHOCYTES # BLD AUTO: 1.15 10*3/MM3 (ref 0.7–3.1)
LYMPHOCYTES NFR BLD AUTO: 7.5 % (ref 19.6–45.3)
Lab: NORMAL
M PNEUMO IGG SER IA-ACNC: NOT DETECTED
MCH RBC QN AUTO: 30.4 PG (ref 26.6–33)
MCHC RBC AUTO-ENTMCNC: 34.1 G/DL (ref 31.5–35.7)
MCV RBC AUTO: 89.2 FL (ref 79–97)
MONOCYTES # BLD AUTO: 1.24 10*3/MM3 (ref 0.1–0.9)
MONOCYTES NFR BLD AUTO: 8.1 % (ref 5–12)
NEUTROPHILS # BLD AUTO: 12.77 10*3/MM3 (ref 1.7–7)
NEUTROPHILS NFR BLD AUTO: 82.9 % (ref 42.7–76)
NRBC BLD AUTO-RTO: 0 /100 WBC (ref 0–0.2)
NT-PROBNP SERPL-MCNC: 378.9 PG/ML (ref 5–1800)
PLATELET # BLD AUTO: 259 10*3/MM3 (ref 140–450)
PMV BLD AUTO: 9.5 FL (ref 6–12)
POTASSIUM BLD-SCNC: 3.8 MMOL/L (ref 3.5–5.2)
PROCALCITONIN SERPL-MCNC: 0.21 NG/ML (ref 0.1–0.25)
PROT SERPL-MCNC: 7.3 G/DL (ref 6–8.5)
RBC # BLD AUTO: 3.98 10*6/MM3 (ref 3.77–5.28)
RHINOVIRUS RNA SPEC NAA+PROBE: NOT DETECTED
RSV RNA NPH QL NAA+NON-PROBE: NOT DETECTED
SODIUM BLD-SCNC: 131 MMOL/L (ref 136–145)
TROPONIN T SERPL-MCNC: <0.01 NG/ML (ref 0–0.03)
WBC NRBC COR # BLD: 15.39 10*3/MM3 (ref 3.4–10.8)
WHOLE BLOOD HOLD SPECIMEN: NORMAL
WHOLE BLOOD HOLD SPECIMEN: NORMAL

## 2020-03-04 PROCEDURE — 36415 COLL VENOUS BLD VENIPUNCTURE: CPT

## 2020-03-04 PROCEDURE — 85025 COMPLETE CBC W/AUTO DIFF WBC: CPT

## 2020-03-04 PROCEDURE — 71046 X-RAY EXAM CHEST 2 VIEWS: CPT

## 2020-03-04 PROCEDURE — 25010000002 CEFTRIAXONE PER 250 MG: Performed by: PHYSICIAN ASSISTANT

## 2020-03-04 PROCEDURE — 87040 BLOOD CULTURE FOR BACTERIA: CPT | Performed by: PHYSICIAN ASSISTANT

## 2020-03-04 PROCEDURE — 84145 PROCALCITONIN (PCT): CPT | Performed by: PHYSICIAN ASSISTANT

## 2020-03-04 PROCEDURE — 83880 ASSAY OF NATRIURETIC PEPTIDE: CPT

## 2020-03-04 PROCEDURE — 94799 UNLISTED PULMONARY SVC/PX: CPT

## 2020-03-04 PROCEDURE — 93010 ELECTROCARDIOGRAM REPORT: CPT | Performed by: INTERNAL MEDICINE

## 2020-03-04 PROCEDURE — 87804 INFLUENZA ASSAY W/OPTIC: CPT | Performed by: FAMILY MEDICINE

## 2020-03-04 PROCEDURE — 99285 EMERGENCY DEPT VISIT HI MDM: CPT

## 2020-03-04 PROCEDURE — 80053 COMPREHEN METABOLIC PANEL: CPT

## 2020-03-04 PROCEDURE — 94640 AIRWAY INHALATION TREATMENT: CPT

## 2020-03-04 PROCEDURE — 99214 OFFICE O/P EST MOD 30 MIN: CPT | Performed by: FAMILY MEDICINE

## 2020-03-04 PROCEDURE — 93005 ELECTROCARDIOGRAM TRACING: CPT

## 2020-03-04 PROCEDURE — 93005 ELECTROCARDIOGRAM TRACING: CPT | Performed by: EMERGENCY MEDICINE

## 2020-03-04 PROCEDURE — 84484 ASSAY OF TROPONIN QUANT: CPT

## 2020-03-04 PROCEDURE — 83605 ASSAY OF LACTIC ACID: CPT | Performed by: PHYSICIAN ASSISTANT

## 2020-03-04 PROCEDURE — 0100U HC BIOFIRE FILMARRAY RESP PANEL 2: CPT | Performed by: EMERGENCY MEDICINE

## 2020-03-04 RX ORDER — ALBUTEROL SULFATE 2.5 MG/3ML
2.5 SOLUTION RESPIRATORY (INHALATION) ONCE
Status: COMPLETED | OUTPATIENT
Start: 2020-03-04 | End: 2020-03-04

## 2020-03-04 RX ORDER — SODIUM CHLORIDE 0.9 % (FLUSH) 0.9 %
10 SYRINGE (ML) INJECTION AS NEEDED
Status: DISCONTINUED | OUTPATIENT
Start: 2020-03-04 | End: 2020-03-08 | Stop reason: HOSPADM

## 2020-03-04 RX ORDER — CEFTRIAXONE SODIUM 1 G/50ML
1 INJECTION, SOLUTION INTRAVENOUS ONCE
Status: COMPLETED | OUTPATIENT
Start: 2020-03-04 | End: 2020-03-05

## 2020-03-04 RX ADMIN — SODIUM CHLORIDE 500 ML: 9 INJECTION, SOLUTION INTRAVENOUS at 23:40

## 2020-03-04 RX ADMIN — ALBUTEROL SULFATE 2.5 MG: 2.5 SOLUTION RESPIRATORY (INHALATION) at 22:14

## 2020-03-04 RX ADMIN — CEFTRIAXONE SODIUM 1 G: 1 INJECTION, SOLUTION INTRAVENOUS at 23:42

## 2020-03-04 NOTE — PROGRESS NOTES
Jenniffer Dumont is a 79 y.o. female.     Chief Complaint   Patient presents with   • Fever     fever ,chest tightness and congestions  and muscle pain with cough       HPI     Pt is a pleasant 79 y.o. YO female here for URI symptoms for 2 days.  She has had symptoms of chest tightness, congestion, myalgias, cough that is productive and fever.  Not improved with over-the-counter medications.  Having mor SOA with issues talking and breathing at the same time - hx of MARIAMA that now has a very sticky congestion.  SOA to the point of difficulty of speaking ini full sentences.     She was on Azithromycin, Rifampin, and EMB for 2 years with 2 negative culture previously seen by ID in 2017 and has been stable.     The following portions of the patient's history were reviewed and updated as appropriate: allergies, current medications, past family history, past medical history, past social history, past surgical history and problem list.    Review of Systems   Constitutional: Positive for fatigue and fever.   HENT: Positive for congestion and ear pain.    Respiratory: Positive for cough.    Cardiovascular: Negative.    Gastrointestinal: Negative.    Endocrine: Negative.    Genitourinary: Negative.    Musculoskeletal: Positive for myalgias.   Allergic/Immunologic: Negative.    Neurological: Negative.    Hematological: Negative.    Psychiatric/Behavioral: Negative.        Objective  Vitals:    03/04/20 0936   BP: 110/64   Pulse: 100   Temp: 98.1 °F (36.7 °C)   SpO2: 97%        Physical Exam   Constitutional: She is oriented to person, place, and time. She appears well-developed and well-nourished. No distress.   HENT:   Head: Normocephalic.   Nose: Nose normal.   No sinus tenderness.   Eyes: EOM are normal.   Cardiovascular: Normal rate, regular rhythm, normal heart sounds and intact distal pulses.   No murmur heard.  Pulmonary/Chest: Effort normal and breath sounds normal. No respiratory distress. She has no wheezes.    Musculoskeletal: Normal range of motion.   Neurological: She is alert and oriented to person, place, and time.   Skin: Skin is warm and dry. No rash noted.   Psychiatric: She has a normal mood and affect. Her behavior is normal. Judgment and thought content normal.   Nursing note and vitals reviewed.        Current Outpatient Medications:   •  albuterol (PROVENTIL) (2.5 MG/3ML) 0.083% nebulizer solution, Take 2.5 mg by nebulization Every 6 (Six) Hours As Needed for Wheezing or Shortness of Air., Disp: 100 vial, Rfl: 11  •  Ascorbic Acid (VITAMIN C) 500 MG capsule, Take  by mouth. Three times weekly, Disp: , Rfl:   •  budesonide-formoterol (SYMBICORT) 80-4.5 MCG/ACT inhaler, Inhale 2 puffs Daily., Disp: 10.2 g, Rfl: 11  •  Calcium Carbonate-Vitamin D (CALCIUM 500 + D PO), Take 2,000 Units by mouth Daily., Disp: , Rfl:   •  Coenzyme Q10 (COQ-10 PO), Take by mouth., Disp: , Rfl:   •  esomeprazole (nexIUM) 40 MG capsule, Take 40 mg by mouth Every Morning Before Breakfast., Disp: , Rfl:   •  fexofenadine (ALLEGRA) 180 MG tablet, Take by mouth daily., Disp: , Rfl:   •  fluticasone (FLONASE) 50 MCG/ACT nasal spray, into each nostril., Disp: , Rfl:   •  MAGNESIUM PO, Take  by mouth., Disp: , Rfl:   •  Multiple Vitamins-Minerals (MULTIVITAMIN WITH MINERALS) tablet, Take 1 tablet by mouth daily., Disp: , Rfl:     Procedures    Lab Results (most recent)     None        Office Visit on 03/04/2020   Component Date Value Ref Range Status   • Rapid Influenza A Ag 03/04/2020 Negative  Negative Final   • Rapid Influenza B Ag 03/04/2020 Negative  Negative Final   • Internal Control 03/04/2020 Passed  Passed Final   • Lot Number 03/04/2020 [id number]   Final   • Expiration Date 03/04/2020 2,022-12   Final           Jenniffer was seen today for fever.    Diagnoses and all orders for this visit:    Flu-like symptoms  -     POCT Influenza A/B    Bronchiectasis without complication (CMS/HCC)  -     Cancel: XR Chest PA & Lateral  -     XR  Chest 2 View    MARIAMA (mycobacterium avium-intracellulare) (CMS/[facility])  -     Cancel: XR Chest PA & Lateral  -     XR Chest 2 View    Cough  -     Cancel: XR Chest PA & Lateral  -     XR Chest 2 View      Pt with hx of MARIAMA and bronchiectasis that has been well controlled until the last week -affirms adherence with Symbicort and albuterol.  She has had 2 days of fever with temperatures to 101, flu test negative.  She does have significant cough and occasional shortness of breath, not able to speak in full sentences.  Oxygen is normal with a mild tachycardia likely from coughing, normal pulse and cardiology exam.  She did have opportunity to see her pulmonologist today, advised her to follow-up with his appointment.      Unknown source of fever, likely a respiratory infection such as pneumonia.  Could also be COPD.  She likely will need steroids and azithromycin, x-ray ordered but unable to do in the office today, patient will go to [place] to have it done.  She may need sputum cultures prior to starting antibiotics.  I advised her to call her pulmonologist to see if they would like to see her today or wait until after treatment pending her x-ray.     Return if symptoms worsen or fail to improve.      Su Castaneda MD

## 2020-03-05 ENCOUNTER — APPOINTMENT (OUTPATIENT)
Dept: CT IMAGING | Facility: HOSPITAL | Age: 80
End: 2020-03-05

## 2020-03-05 PROBLEM — J18.9 PNEUMONIA DUE TO INFECTIOUS ORGANISM: Status: ACTIVE | Noted: 2020-03-05

## 2020-03-05 PROCEDURE — 25010000002 AZITHROMYCIN PER 500 MG: Performed by: PHYSICIAN ASSISTANT

## 2020-03-05 PROCEDURE — 87186 SC STD MICRODIL/AGAR DIL: CPT

## 2020-03-05 PROCEDURE — 94799 UNLISTED PULMONARY SVC/PX: CPT

## 2020-03-05 PROCEDURE — 87149 DNA/RNA DIRECT PROBE: CPT

## 2020-03-05 PROCEDURE — 87070 CULTURE OTHR SPECIMN AEROBIC: CPT | Performed by: INTERNAL MEDICINE

## 2020-03-05 PROCEDURE — 94669 MECHANICAL CHEST WALL OSCILL: CPT

## 2020-03-05 PROCEDURE — 87116 MYCOBACTERIA CULTURE: CPT | Performed by: INTERNAL MEDICINE

## 2020-03-05 PROCEDURE — 71250 CT THORAX DX C-: CPT

## 2020-03-05 PROCEDURE — 25010000002 LEVOFLOXACIN PER 250 MG: Performed by: INTERNAL MEDICINE

## 2020-03-05 PROCEDURE — 87205 SMEAR GRAM STAIN: CPT | Performed by: INTERNAL MEDICINE

## 2020-03-05 PROCEDURE — 94640 AIRWAY INHALATION TREATMENT: CPT

## 2020-03-05 PROCEDURE — 87158 CULTURE TYPING ADDED METHOD: CPT

## 2020-03-05 PROCEDURE — 87206 SMEAR FLUORESCENT/ACID STAI: CPT | Performed by: INTERNAL MEDICINE

## 2020-03-05 RX ORDER — CETIRIZINE HYDROCHLORIDE 10 MG/1
5 TABLET ORAL DAILY
Status: DISCONTINUED | OUTPATIENT
Start: 2020-03-05 | End: 2020-03-08 | Stop reason: HOSPADM

## 2020-03-05 RX ORDER — SODIUM CHLORIDE 0.9 % (FLUSH) 0.9 %
10 SYRINGE (ML) INJECTION AS NEEDED
Status: DISCONTINUED | OUTPATIENT
Start: 2020-03-05 | End: 2020-03-08 | Stop reason: HOSPADM

## 2020-03-05 RX ORDER — FLUTICASONE PROPIONATE 50 MCG
2 SPRAY, SUSPENSION (ML) NASAL DAILY
Status: DISCONTINUED | OUTPATIENT
Start: 2020-03-05 | End: 2020-03-08 | Stop reason: HOSPADM

## 2020-03-05 RX ORDER — BUDESONIDE AND FORMOTEROL FUMARATE DIHYDRATE 80; 4.5 UG/1; UG/1
2 AEROSOL RESPIRATORY (INHALATION)
Status: DISCONTINUED | OUTPATIENT
Start: 2020-03-05 | End: 2020-03-08 | Stop reason: HOSPADM

## 2020-03-05 RX ORDER — SODIUM CHLORIDE 0.9 % (FLUSH) 0.9 %
10 SYRINGE (ML) INJECTION EVERY 12 HOURS SCHEDULED
Status: DISCONTINUED | OUTPATIENT
Start: 2020-03-05 | End: 2020-03-08 | Stop reason: HOSPADM

## 2020-03-05 RX ORDER — PANTOPRAZOLE SODIUM 40 MG/1
40 TABLET, DELAYED RELEASE ORAL EVERY MORNING
Status: DISCONTINUED | OUTPATIENT
Start: 2020-03-05 | End: 2020-03-08 | Stop reason: HOSPADM

## 2020-03-05 RX ORDER — IPRATROPIUM BROMIDE AND ALBUTEROL SULFATE 2.5; .5 MG/3ML; MG/3ML
3 SOLUTION RESPIRATORY (INHALATION)
Status: DISCONTINUED | OUTPATIENT
Start: 2020-03-05 | End: 2020-03-08 | Stop reason: HOSPADM

## 2020-03-05 RX ORDER — CEFTRIAXONE SODIUM 1 G/50ML
1 INJECTION, SOLUTION INTRAVENOUS EVERY 24 HOURS
Status: DISCONTINUED | OUTPATIENT
Start: 2020-03-05 | End: 2020-03-05

## 2020-03-05 RX ORDER — LEVOFLOXACIN 5 MG/ML
750 INJECTION, SOLUTION INTRAVENOUS EVERY 24 HOURS
Status: DISCONTINUED | OUTPATIENT
Start: 2020-03-05 | End: 2020-03-06

## 2020-03-05 RX ORDER — PANTOPRAZOLE SODIUM 40 MG/1
40 TABLET, DELAYED RELEASE ORAL EVERY MORNING
Status: DISCONTINUED | OUTPATIENT
Start: 2020-03-05 | End: 2020-03-05 | Stop reason: SDUPTHER

## 2020-03-05 RX ORDER — IPRATROPIUM BROMIDE AND ALBUTEROL SULFATE 2.5; .5 MG/3ML; MG/3ML
3 SOLUTION RESPIRATORY (INHALATION)
Status: DISCONTINUED | OUTPATIENT
Start: 2020-03-05 | End: 2020-03-05

## 2020-03-05 RX ADMIN — IPRATROPIUM BROMIDE AND ALBUTEROL SULFATE 3 ML: 2.5; .5 SOLUTION RESPIRATORY (INHALATION) at 11:36

## 2020-03-05 RX ADMIN — AZITHROMYCIN 500 MG: 500 INJECTION, POWDER, LYOPHILIZED, FOR SOLUTION INTRAVENOUS at 00:14

## 2020-03-05 RX ADMIN — IPRATROPIUM BROMIDE AND ALBUTEROL SULFATE 3 ML: 2.5; .5 SOLUTION RESPIRATORY (INHALATION) at 08:16

## 2020-03-05 RX ADMIN — PANTOPRAZOLE SODIUM 40 MG: 40 TABLET, DELAYED RELEASE ORAL at 06:52

## 2020-03-05 RX ADMIN — LEVOFLOXACIN 750 MG: 5 INJECTION, SOLUTION INTRAVENOUS at 18:22

## 2020-03-05 RX ADMIN — SODIUM CHLORIDE, PRESERVATIVE FREE 10 ML: 5 INJECTION INTRAVENOUS at 11:46

## 2020-03-05 RX ADMIN — IPRATROPIUM BROMIDE AND ALBUTEROL SULFATE 3 ML: 2.5; .5 SOLUTION RESPIRATORY (INHALATION) at 19:49

## 2020-03-05 RX ADMIN — SODIUM CHLORIDE, PRESERVATIVE FREE 10 ML: 5 INJECTION INTRAVENOUS at 00:13

## 2020-03-05 RX ADMIN — IPRATROPIUM BROMIDE AND ALBUTEROL SULFATE 3 ML: 2.5; .5 SOLUTION RESPIRATORY (INHALATION) at 15:05

## 2020-03-05 RX ADMIN — SODIUM CHLORIDE, PRESERVATIVE FREE 10 ML: 5 INJECTION INTRAVENOUS at 20:00

## 2020-03-05 NOTE — ED NOTES
Nursing report ED to floor  Jenniffer Dumont  79 y.o.  female    HPI (triage note):   Chief Complaint   Patient presents with   • Flu Symptoms       Admitting doctor:   Mando Montalvo MD    Admitting diagnosis:   The encounter diagnosis was Pneumonia due to infectious organism, unspecified laterality, unspecified part of lung.    Code status:   Current Code Status     Date Active Code Status Order ID Comments User Context       Not on file          Allergies:   Amoxicillin; Ampicillin; and Dust mite extract    Weight:   There were no vitals filed for this visit.    Most recent vitals:   Vitals:    03/05/20 0005 03/05/20 0035 03/05/20 0105 03/05/20 0121   BP: 135/80 135/84 112/76 131/87   Pulse: 113 110 112 113   Resp: 16   16   Temp:       TempSrc:       SpO2: 95% 94% 94% 93%   Height:           Active LDAs/IV Access:   Lines, Drains & Airways    Active LDAs     Name:   Placement date:   Placement time:   Site:   Days:    Peripheral IV 03/04/20 2337 Left Antecubital   03/04/20 2337    Antecubital   less than 1                Labs (abnormal labs have a star):   Labs Reviewed   COMPREHENSIVE METABOLIC PANEL - Abnormal; Notable for the following components:       Result Value    Glucose 119 (*)     Sodium 131 (*)     Chloride 92 (*)     AST (SGOT) 37 (*)     All other components within normal limits    Narrative:     GFR Normal >60  Chronic Kidney Disease <60  Kidney Failure <15     CBC WITH AUTO DIFFERENTIAL - Abnormal; Notable for the following components:    WBC 15.39 (*)     RDW 12.2 (*)     Neutrophil % 82.9 (*)     Lymphocyte % 7.5 (*)     Eosinophil % 0.1 (*)     Immature Grans % 0.8 (*)     Neutrophils, Absolute 12.77 (*)     Monocytes, Absolute 1.24 (*)     Immature Grans, Absolute 0.12 (*)     All other components within normal limits   RESPIRATORY PANEL, PCR - Normal   BNP (IN-HOUSE) - Normal    Narrative:     Among patients with dyspnea, NT-proBNP is highly sensitive for the detection of acute congestive  "heart failure. In addition NT-proBNP of <300 pg/ml effectively rules out acute congestive heart failure with 99% negative predictive value.    Results may be falsely decreased if patient taking Biotin.     TROPONIN (IN-HOUSE) - Normal    Narrative:     Troponin T Reference Range:  <= 0.03 ng/mL-   Negative for AMI  >0.03 ng/mL-     Abnormal for myocardial necrosis.  Clinicians would have to utilize clinical acumen, EKG, Troponin and serial changes to determine if it is an Acute Myocardial Infarction or myocardial injury due to an underlying chronic condition.       Results may be falsely decreased if patient taking Biotin.     LACTIC ACID, PLASMA - Normal   PROCALCITONIN - Normal    Narrative:     As a Marker for Sepsis (Non-Neonates):   1. <0.5 ng/mL represents a low risk of severe sepsis and/or septic shock.  1. >2 ng/mL represents a high risk of severe sepsis and/or septic shock.    As a Marker for Lower Respiratory Tract Infections that require antibiotic therapy:  PCT on Admission     Antibiotic Therapy             6-12 Hrs later  > 0.5                Strongly Recommended            >0.25 - <0.5         Recommended  0.1 - 0.25           Discouraged                   Remeasure/reassess PCT  <0.1                 Strongly Discouraged          Remeasure/reassess PCT      As 28 day mortality risk marker: \"Change in Procalcitonin Result\" (> 80 % or <=80 %) if Day 0 (or Day 1) and Day 4 values are available. Refer to http://www.[facility]-pct-calculator.com/   Change in PCT <=80 %   A decrease of PCT levels below or equal to 80 % defines a positive change in PCT test result representing a higher risk for 28-day all-cause mortality of patients diagnosed with severe sepsis or septic shock.  Change in PCT > 80 %   A decrease of PCT levels of more than 80 % defines a negative change in PCT result representing a lower risk for 28-day all-cause mortality of patients diagnosed with severe sepsis or septic " shock.                Results may be falsely decreased if patient taking Biotin.    BLOOD CULTURE   BLOOD CULTURE   RAINBOW DRAW    Narrative:     The following orders were created for panel order [place] Draw.  Procedure                               Abnormality         Status                     ---------                               -----------         ------                     Light Blue Top[[id number]]                                   Final result               Green Top (Gel)[[id number]]                                  Final result               Lavender Top[[id number]]                                     Final result               Gold Top - SST[[id number]]                                   Final result                 Please view results for these tests on the individual orders.   CBC AND DIFFERENTIAL    Narrative:     The following orders were created for panel order CBC & Differential.  Procedure                               Abnormality         Status                     ---------                               -----------         ------                     CBC Auto Differential[[id number]]        Abnormal            Final result                 Please view results for these tests on the individual orders.   LIGHT BLUE TOP   GREEN TOP   LAVENDER TOP   GOLD TOP - SST       EKG:   ECG 12 Lead   Final Result   HEART RATE= 101  bpm   RR Interval= 592  ms   MS Interval= 125  ms   P Horizontal Axis= -11  deg   P Front Axis= 55  deg   QRSD Interval= 78  ms   QT Interval= 310  ms   QRS Axis= 66  deg   T Wave Axis= 25  deg   - BORDERLINE ECG -   Sinus tachycardia   Atrial premature complex   Borderline ST depression, anterolateral leads   NO PRIOR TRACING AVAILABLE FOR COMPARISON   Electronically Signed By: Alivia Zepeda ([facility]) 04-Mar-2020 23:08:03   Date and Time of Study: 2020-03-04 20:45:20          Meds given in ED:   Medications   sodium chloride 0.9 % flush 10 mL (10 mL Intravenous Given 3/5/20 0013)    cefTRIAXone (ROCEPHIN) IVPB 1 g (0 g Intravenous Stopped 3/5/20 0014)   azithromycin (ZITHROMAX) 500 mg 0.9% NaCl (Add-vantage) 250 mL (500 mg Intravenous New Bag 3/5/20 0014)   albuterol (PROVENTIL) nebulizer solution 0.083% 2.5 mg/3mL (2.5 mg Nebulization Given 3/4/20 2214)   sodium chloride 0.9 % bolus 500 mL (0 mL Intravenous Stopped 3/5/20 0014)       Imaging results:  Xr Chest 2 View    Result Date: 3/4/2020  No significant interval change when compared to prior chest x-ray 2016. There are vague parenchymal opacities in the right middle lobe and lingular segment of left upper lobe where the patient has extensive bronchiectasis and atelectatic lung and chronic opacities in the lateral aspect of the right midlung zone and the lung apices bilaterally all of which appear unchanged. No new abnormality or active disease is seen.  This report was finalized on 3/4/2020 4:38 PM by Dr. Milind Duque M.D.        Ambulatory status:   - Pt ambulatory with steady and even gait in ED.    Social issues:   Social History     Socioeconomic History   • Marital status: Single     Spouse name: Not on file   • Number of children: Not on file   • Years of education: Not on file   • Highest education level: Not on file   Tobacco Use   • Smoking status: Former Smoker     Last attempt to quit:      Years since quittin.1   • Smokeless tobacco: Never Used   Substance and Sexual Activity   • Alcohol use: Yes     Alcohol/week: 2.0 standard drinks     Types: 2 Glasses of wine per week     Frequency: 2-4 times a month     Drinks per session: 1 or 2     Comment: Social use   • Drug use: No   • Sexual activity: Never     Comment: : Ed (Jenn Monsivais RN  [phone]

## 2020-03-05 NOTE — ED PROVIDER NOTES
EMERGENCY DEPARTMENT ENCOUNTER    CHIEF COMPLAINT  Chief Complaint: Cough  History given by: Patient  History limited by: None  Room Number: 51/51  PMD: Su Castaneda MD  Pulmonologist: Dr. Amado Aguiar    HPI:  Pt is a 79 y.o. female who presents complaining of a productive cough with green, sticky sputum that began a few days ago and worsened yesterday when she became febrile (101 Tmax). She affirms associated SOA and chest congestion. Patient denies known recent ill contacts. Patient reports she was seen at [facility] today for flu-like symptoms, and she had a chest X-ray done. Patient is still waiting for the results of this XR. Patient reports she has taken albuterol treatments without much improvement.      Duration:  A few days  Onset: gradual  Timing: constant  Location: respiratory  Radiation: none  Quality: green/yellow, sticky sputum  Intensity/Severity: moderate  Progression: worsened  Associated Symptoms: SOA, chest congestion, fever  Aggravating Factors: none  Alleviating Factors: none  Previous Episodes: no  Treatment before arrival: none    PAST MEDICAL HISTORY  Active Ambulatory Problems     Diagnosis Date Noted   • Bronchiectasis without complication (CMS/HCC) 02/05/2016   • Cough 02/05/2016   • HLD (hyperlipidemia) 02/05/2016   • MARIAMA (mycobacterium avium-intracellulare) (CMS/HCC) 03/06/2018   • Allergic sinusitis 01/14/2019   • History of melanoma 01/14/2019   • Gastroesophageal reflux disease 10/07/2019   • Hyperglycemia 10/07/2019     Resolved Ambulatory Problems     Diagnosis Date Noted   • Acute infection of nasal sinus 02/05/2016   • PNA (pneumonia) 02/05/2016   • Carpal bone fracture 02/05/2016   • Health care maintenance 02/05/2016     Past Medical History:   Diagnosis Date   • Allergy desensitization therapy    • Bronchiectasis (CMS/HCC)    • History of bronchoscopy    • Hyperlipidemia    • Melanoma (CMS/HCC)        PAST SURGICAL HISTORY  Past Surgical History:   Procedure  Laterality Date   • HYSTERECTOMY     • SKIN CANCER EXCISION     • TONSILLECTOMY         FAMILY HISTORY  Family History   Problem Relation Age of Onset   • Heart attack Father 80   • Emphysema Mother         smoker   • Stroke Paternal Grandmother    • Diabetes Paternal Grandfather    • Diabetes Paternal Uncle    • Breast cancer Neg Hx    • Colon cancer Neg Hx        SOCIAL HISTORY  Social History     Socioeconomic History   • Marital status: Single     Spouse name: Not on file   • Number of children: Not on file   • Years of education: Not on file   • Highest education level: Not on file   Tobacco Use   • Smoking status: Former Smoker     Last attempt to quit:      Years since quittin.1   • Smokeless tobacco: Never Used   Substance and Sexual Activity   • Alcohol use: Yes     Alcohol/week: 2.0 standard drinks     Types: 2 Glasses of wine per week     Frequency: 2-4 times a month     Drinks per session: 1 or 2     Comment: Social use   • Drug use: No   • Sexual activity: Never     Comment: : Ed (Edwnin)        ALLERGIES  Amoxicillin; Ampicillin; and Dust mite extract    REVIEW OF SYSTEMS  Review of Systems   Constitutional: Positive for fever.   HENT: Positive for congestion. Negative for sore throat.    Eyes: Negative.    Respiratory: Positive for cough. Negative for shortness of breath.    Cardiovascular: Negative for chest pain.   Gastrointestinal: Negative for abdominal pain, diarrhea and vomiting.   Genitourinary: Negative for dysuria.   Musculoskeletal: Negative for neck pain.   Skin: Negative for rash.   Allergic/Immunologic: Negative.    Neurological: Negative for weakness, numbness and headaches.   Hematological: Negative.    Psychiatric/Behavioral: Negative.    All other systems reviewed and are negative.      PHYSICAL EXAM  ED Triage Vitals   Temp Heart Rate Resp BP SpO2   20 19320 19320   100 °F (37.8 °C) 116 18 138/84 96 %      Temp  src Heart Rate Source Patient Position BP Location FiO2 (%)   03/04/20 1935 03/04/20 1935 -- -- --   Tympanic Monitor          Physical Exam   Constitutional: She is oriented to person, place, and time. No distress.   HENT:   Head: Normocephalic and atraumatic.   Eyes: Pupils are equal, round, and reactive to light. EOM are normal.   Neck: Normal range of motion. Neck supple.   Cardiovascular: Normal rate, regular rhythm and normal heart sounds. Exam reveals no gallop and no friction rub.   No murmur heard.  Pulmonary/Chest: Effort normal. No respiratory distress. She has wheezes in the right middle field. She has rhonchi in the right middle field. She has no rales.   Abdominal: Soft. There is no tenderness. There is no rebound and no guarding.   Musculoskeletal: Normal range of motion. She exhibits no edema.   Neurological: She is alert and oriented to person, place, and time. She has normal sensation and normal strength.   Skin: Skin is warm and dry. No rash noted.   Psychiatric: Mood and affect normal.   Nursing note and vitals reviewed.      LAB RESULTS  Lab Results (last 24 hours)     Procedure Component Value Units Date/Time    CBC & Differential [[id number]] Collected:  03/04/20 2104    Specimen:  Blood from Arm, Left Updated:  03/04/20 2126    Narrative:       The following orders were created for panel order CBC & Differential.  Procedure                               Abnormality         Status                     ---------                               -----------         ------                     CBC Auto Differential[[id number]]        Abnormal            Final result                 Please view results for these tests on the individual orders.    Comprehensive Metabolic Panel [[id number]]  (Abnormal) Collected:  03/04/20 2104    Specimen:  Blood from Arm, Left Updated:  03/04/20 2148     Glucose 119 mg/dL      BUN 12 mg/dL      Creatinine 0.82 mg/dL      Sodium 131 mmol/L      Potassium 3.8 mmol/L       Chloride 92 mmol/L      CO2 24.6 mmol/L      Calcium 9.1 mg/dL      Total Protein 7.3 g/dL      Albumin 4.20 g/dL      ALT (SGPT) 22 U/L      AST (SGOT) 37 U/L      Alkaline Phosphatase 68 U/L      Total Bilirubin 0.7 mg/dL      eGFR Non African Amer 67 mL/min/1.73      Globulin 3.1 gm/dL      A/G Ratio 1.4 g/dL      BUN/Creatinine Ratio 14.6     Anion Gap 14.4 mmol/L     Narrative:       GFR Normal >60  Chronic Kidney Disease <60  Kidney Failure <15      BNP [[id number]]  (Normal) Collected:  03/04/20 2104    Specimen:  Blood from Arm, Left Updated:  03/04/20 2146     proBNP 378.9 pg/mL     Narrative:       Among patients with dyspnea, NT-proBNP is highly sensitive for the detection of acute congestive heart failure. In addition NT-proBNP of <300 pg/ml effectively rules out acute congestive heart failure with 99% negative predictive value.    Results may be falsely decreased if patient taking Biotin.      Troponin [[id number]]  (Normal) Collected:  03/04/20 2104    Specimen:  Blood from Arm, Left Updated:  03/04/20 2148     Troponin T <0.010 ng/mL     Narrative:       Troponin T Reference Range:  <= 0.03 ng/mL-   Negative for AMI  >0.03 ng/mL-     Abnormal for myocardial necrosis.  Clinicians would have to utilize clinical acumen, EKG, Troponin and serial changes to determine if it is an Acute Myocardial Infarction or myocardial injury due to an underlying chronic condition.       Results may be falsely decreased if patient taking Biotin.      CBC Auto Differential [[id number]]  (Abnormal) Collected:  03/04/20 2104    Specimen:  Blood from Arm, Left Updated:  03/04/20 2126     WBC 15.39 10*3/mm3      RBC 3.98 10*6/mm3      Hemoglobin 12.1 g/dL      Hematocrit 35.5 %      MCV 89.2 fL      MCH 30.4 pg      MCHC 34.1 g/dL      RDW 12.2 %      RDW-SD 39.8 fl      MPV 9.5 fL      Platelets 259 10*3/mm3      Neutrophil % 82.9 %      Lymphocyte % 7.5 %      Monocyte % 8.1 %      Eosinophil % 0.1 %      Basophil % 0.6 %       "Immature Grans % 0.8 %      Neutrophils, Absolute 12.77 10*3/mm3      Lymphocytes, Absolute 1.15 10*3/mm3      Monocytes, Absolute 1.24 10*3/mm3      Eosinophils, Absolute 0.02 10*3/mm3      Basophils, Absolute 0.09 10*3/mm3      Immature Grans, Absolute 0.12 10*3/mm3      nRBC 0.0 /100 WBC     Procalcitonin [[id number]]  (Normal) Collected:  03/04/20 2104    Specimen:  Blood from Arm, Left Updated:  03/04/20 2255     Procalcitonin 0.21 ng/mL     Narrative:       As a Marker for Sepsis (Non-Neonates):   1. <0.5 ng/mL represents a low risk of severe sepsis and/or septic shock.  1. >2 ng/mL represents a high risk of severe sepsis and/or septic shock.    As a Marker for Lower Respiratory Tract Infections that require antibiotic therapy:  PCT on Admission     Antibiotic Therapy             6-12 Hrs later  > 0.5                Strongly Recommended            >0.25 - <0.5         Recommended  0.1 - 0.25           Discouraged                   Remeasure/reassess PCT  <0.1                 Strongly Discouraged          Remeasure/reassess PCT      As 28 day mortality risk marker: \"Change in Procalcitonin Result\" (> 80 % or <=80 %) if Day 0 (or Day 1) and Day 4 values are available. Refer to http://www.[facility]-pct-calculator.com/   Change in PCT <=80 %   A decrease of PCT levels below or equal to 80 % defines a positive change in PCT test result representing a higher risk for 28-day all-cause mortality of patients diagnosed with severe sepsis or septic shock.  Change in PCT > 80 %   A decrease of PCT levels of more than 80 % defines a negative change in PCT result representing a lower risk for 28-day all-cause mortality of patients diagnosed with severe sepsis or septic shock.                Results may be falsely decreased if patient taking Biotin.     Respiratory Panel, PCR - Swab, Nasopharynx [[id number]]  (Normal) Collected:  03/04/20 2212    Specimen:  Swab from Nasopharynx Updated:  03/04/20 2324     ADENOVIRUS, PCR Not " Detected     Coronavirus 229E Not Detected     Coronavirus HKU1 Not Detected     Coronavirus NL63 Not Detected     Coronavirus OC43 Not Detected     Human Metapneumovirus Not Detected     Human Rhinovirus/Enterovirus Not Detected     Influenza B PCR Not Detected     Parainfluenza Virus 1 Not Detected     Parainfluenza Virus 2 Not Detected     Parainfluenza Virus 3 Not Detected     Parainfluenza Virus 4 Not Detected     Bordetella pertussis pcr Not Detected     Influenza A H1 2009 PCR Not Detected     Chlamydophila pneumoniae PCR Not Detected     Mycoplasma pneumo by PCR Not Detected     Influenza A PCR Not Detected     Influenza A H3 Not Detected     Influenza A H1 Not Detected     RSV, PCR Not Detected     Bordetella parapertussis PCR Not Detected    Blood Culture - Blood, Arm, Left [[id number]] Collected:  03/04/20 2251    Specimen:  Blood from Arm, Left Updated:  03/05/20 1115     Blood Culture No growth at less than 24 hours    Lactic Acid, Plasma [[id number]]  (Normal) Collected:  03/04/20 2251    Specimen:  Blood Updated:  03/04/20 2320     Lactate 1.1 mmol/L     Blood Culture - Blood, Arm, Left [[id number]] Collected:  03/04/20 2311    Specimen:  Blood from Arm, Left Updated:  03/05/20 1130     Blood Culture No growth at less than 24 hours    AFB Culture - Sputum, Cough [[id number]] Collected:  03/05/20 1751    Specimen:  Sputum from Cough Updated:  03/05/20 1751    Respiratory Culture - Sputum, Cough [[id number]] Collected:  03/05/20 1751    Specimen:  Sputum from Cough Updated:  03/05/20 2029     Gram Stain Few (2+) WBCs per low power field      Rare (1+) Epithelial cells per low power field      Rare (1+) Mixed bacterial morphotypes seen on Gram Stain          I ordered the above labs and reviewed the results    RADIOLOGY  CT Chest Without Contrast   Preliminary Result   1. There are acute on chronic findings. There are new reticular nodular   opacities superimposed on chronic opacities. There are new      consolidations within the lingula and right middle lobe which have   chronic scarring and bronchiectasis. There is likely multifocal   pneumonia. The appearance can be seen with MAC.   2. New tiny left pleural effusion.   3. New mediastinal and hilar lymphadenopathy is likely reactive.   Reevaluation is recommended with a chest CT (preferably with IV   contrast) in 3 months.               I ordered the above noted radiological studies. Interpreted by radiologist. Reviewed by me in PACS.       PROCEDURES  Critical Care  Performed by: Josep Simental III, PA  Authorized by: Mando Montalvo MD     Critical care provider statement:     Critical care time (minutes):  30    Critical care start time:  3/4/2020 10:00 PM    Critical care end time:  3/4/2020 10:30 PM    Critical care time was exclusive of:  Separately billable procedures and treating other patients    Critical care was necessary to treat or prevent imminent or life-threatening deterioration of the following conditions:  Sepsis    Critical care was time spent personally by me on the following activities:  Blood draw for specimens, development of treatment plan with patient or surrogate, evaluation of patient's response to treatment, examination of patient, interpretation of cardiac output measurements, obtaining history from patient or surrogate, ordering and review of laboratory studies, ordering and performing treatments and interventions, ordering and review of radiographic studies, pulse oximetry, re-evaluation of patient's condition and review of old charts    I assumed direction of critical care for this patient from another provider in my specialty: no            PROGRESS AND CONSULTS  ED Course as of Mar 05 2051   Wed Mar 04, 2020   2148 Respiratory Panel, PCR - Swab, Nasopharynx [RC]      ED Course User Index  [RC] Josep Simental III PA     2210 reviewed patient's case with Dr. Sheehan, ER physician. After bedside evaluation, Dr. Sheehan  agrees with the plan of care.     0057 Reviewed the patient's case with Dr. Montalvo, pulmonology, who agrees with the plan to admit the patient.    MEDICAL DECISION MAKING  Results were reviewed/discussed with the patient and they were also made aware of online access. Pt also made aware that some labs, such as cultures, will not be resulted during ER visit and follow up with PMD is necessary.     MDM  Number of Diagnoses or Management Options  Pneumonia due to infectious organism, unspecified laterality, unspecified part of lung:      Amount and/or Complexity of Data Reviewed  Decide to obtain previous medical records or to obtain history from someone other than the patient: yes (Epic)  Review and summarize past medical records: yes (Patient was seen at her PCP earlier today for flu-like symptoms. She had a negative influenza swab, and a CXR done.)  Discuss the patient with other providers: yes (Dr. Sissy MD)    Critical Care  Total time providing critical care: 30-74 minutes    Patient Progress  Patient progress: stable         DIAGNOSIS  Final diagnoses:   Pneumonia due to infectious organism, unspecified laterality, unspecified part of lung       DISPOSITION  ADMISSION    Discussed treatment plan and reason for admission with pt/family and admitting physician.  Pt/family voiced understanding of the plan for admission for further testing/treatment as needed.     Latest Documented Vital Signs:  As of 8:51 PM  BP- 116/72 HR- 105 Temp- 99.1 °F (37.3 °C) (Oral) O2 sat- 95%    --  Documentation assistance provided by chelsey Polanco for Josep Simental PA-C.  Information recorded by the scribe was done at my direction and has been verified and validated by me.         Key Polanco  03/05/20 0327

## 2020-03-05 NOTE — H&P
Group: [facility]         H/p  NOTE    Patient Identification:  Jenniffer Dumont  79 y.o.  female  1940  [id number]               CC: Cough with fever    History of Present Illness:  79-year-old female follows my partner Dr. Amado Beaulieu with known history of bronchiectasis with history of MARIAMA.  She presented to the emergency room with cough productive of purulent sputum.  This is been worsening for the last couple of days.  She had associated shortness of breath and chest congestion.  She also reported fever up to 101.  No aspiration was reported.  She is on aggressive pulmonary toilet with bronchodilators at home and despite aggressive bronchodilators she did not improve.  No hemoptysis as such was reported currently.      Review of Systems  Constitutional: Positive for fever.   HENT: Positive for congestion. Negative for sore throat.    Eyes: Negative.    Respiratory: Positive for cough. Negative for shortness of breath.    Cardiovascular: Negative for chest pain.   Gastrointestinal: Negative for abdominal pain, diarrhea and vomiting.   Genitourinary: Negative for dysuria.   Musculoskeletal: Negative for neck pain.   Skin: Negative for rash.   Allergic/Immunologic: Negative.    Neurological: Negative for weakness, numbness and headaches.   Hematological: Negative.    Psychiatric/Behavioral: Negative.    All other systems reviewed and are negative.     Past Medical History:  Past Medical History:   Diagnosis Date   • Allergy desensitization therapy    • Bronchiectasis (CMS/HCC)    • History of bronchoscopy     2 times   • Hyperlipidemia    • MARIAMA (mycobacterium avium-intracellulare) (CMS/HCC)    • Melanoma (CMS/HCC)        Past Surgical History:  Past Surgical History:   Procedure Laterality Date   • HYSTERECTOMY     • SKIN CANCER EXCISION     • TONSILLECTOMY          Home Meds:    (Not in a hospital admission)    Allergies:  Allergies   Allergen Reactions   • Amoxicillin Diarrhea   • Ampicillin  "Diarrhea   • Dust Mite Extract Unknown (See Comments)     Sinus        Social History:   Social History     Socioeconomic History   • Marital status: Single     Spouse name: Not on file   • Number of children: Not on file   • Years of education: Not on file   • Highest education level: Not on file   Tobacco Use   • Smoking status: Former Smoker     Last attempt to quit:      Years since quittin.1   • Smokeless tobacco: Never Used   Substance and Sexual Activity   • Alcohol use: Yes     Alcohol/week: 2.0 standard drinks     Types: 2 Glasses of wine per week     Frequency: 2-4 times a month     Drinks per session: 1 or 2     Comment: Social use   • Drug use: No   • Sexual activity: Never     Comment: : Ed (Edwnin)        Family History:  Family History   Problem Relation Age of Onset   • Heart attack Father 80   • Emphysema Mother         smoker   • Stroke Paternal Grandmother    • Diabetes Paternal Grandfather    • Diabetes Paternal Uncle    • Breast cancer Neg Hx    • Colon cancer Neg Hx        Physical Exam:  /94 (BP Location: Right arm, Patient Position: Lying)   Pulse 112   Temp 99.4 °F (37.4 °C) (Oral)   Resp 20   Ht 157.5 cm (62\")   Wt 61.6 kg (135 lb 11.2 oz)   SpO2 95%   BMI 24.82 kg/m²  Body mass index is 24.82 kg/m². 95% 61.6 kg (135 lb 11.2 oz)  Physical Exam  Well-developed normal body habitus  Eyes normal conjunctive a pupils reactive to light  ENT Mallampati between 3 and 4 normal nasal exam  Neck midline trachea no thyromegaly  Chest diminished breath sound crackles and wheezing bilaterally right greater than left no labored breathing  CVs regular rate rhythm no lower extremity edema  Abdomen soft nontender no paraspinal megaly  CNS intact normal sensory exam  Skin no rashes no nodules  Psych oriented to time place and person normal memory  Musculoskeletal no cyanosis no clubbing normal range of motion      LABS:  Lab Results   Component Value Date    CALCIUM 9.1 2020 "     Results from last 7 days   Lab Units 03/04/20 2104   SODIUM mmol/L 131*   POTASSIUM mmol/L 3.8   CHLORIDE mmol/L 92*   CO2 mmol/L 24.6   BUN mg/dL 12   CREATININE mg/dL 0.82   GLUCOSE mg/dL 119*   CALCIUM mg/dL 9.1   WBC 10*3/mm3 15.39*   HEMOGLOBIN g/dL 12.1   PLATELETS 10*3/mm3 259   ALT (SGPT) U/L 22   AST (SGOT) U/L 37*   PROBNP pg/mL 378.9   PROCALCITONIN ng/mL 0.21     Lab Results   Component Value Date    TROPONINT <0.010 03/04/2020     Results from last 7 days   Lab Units 03/04/20  2104   TROPONIN T ng/mL <0.010         Results from last 7 days   Lab Units 03/04/20  2251 03/04/20  2104   PROCALCITONIN ng/mL  --  0.21   LACTATE mmol/L 1.1  --          Results from last 7 days   Lab Units 03/04/20  2212   ADENOVIRUS DETECTION BY PCR  Not Detected   CORONAVIRUS 229E  Not Detected   CORONAVIRUS HKU1  Not Detected   CORONAVIRUS NL63  Not Detected   CORONAVIRUS OC43  Not Detected   HUMAN METAPNEUMOVIRUS  Not Detected   HUMAN RHINOVIRUS/ENTEROVIRUS  Not Detected   INFLUENZA B PCR  Not Detected   PARAINFLUENZA 1  Not Detected   PARAINFLUENZA VIRUS 2  Not Detected   PARAINFLUENZA VIRUS 3  Not Detected   PARAINFLUENZA VIRUS 4  Not Detected   BORDETELLA PERTUSSIS PCR  Not Detected   BORDETELLA PARAPERTUSSIS PCR  Not Detected   [id number]  Not Detected   CHLAMYDOPHILA PNEUMONIAE PCR  Not Detected   MYCOPLAMA PNEUMO PCR  Not Detected   INFLUENZA A H3  Not Detected   INFLUENZA A H1  Not Detected   RSV, PCR  Not Detected             No results found for: TSH  Estimated Creatinine Clearance: 46.5 mL/min (by C-G formula based on SCr of 0.82 mg/dL).         Imaging: I personally visualized the images of scans/x-rays performed within last 3 days.      Assessment:  Community-acquired pneumonia  Sepsis  Bronchiectasis with history of hemoptysis  History of MAC  History of MDR stenotrophomonas     Recommendations:  At this point initiate broad-spectrum antibiotic treat for possible bronchiectasis exacerbation.  De-escalate  antibiotics based on cultures  Aggressive pulmonary toilet with bronchodilators  She is wheezing currently and I will initiate some steroids also IV.  Wean down oxygen to baseline  Continue home medications  She follows Dr. Amado Beaulieu who will follow during hospitalization            Mando Montalvo MD  3/5/2020  3:42 AM      Much of this encounter note is an electronic transcription/translation of spoken language to printed text using Dragon Software.

## 2020-03-05 NOTE — PLAN OF CARE
Problem: Patient Care Overview  Goal: Plan of Care Review  Outcome: Ongoing (interventions implemented as appropriate)  Flowsheets (Taken 3/5/2020 5669)  Progress: improving  Plan of Care Reviewed With: patient  Outcome Summary: New admit with PNA. Coughing and wheezing. Up ad harper. IV abx. COntinue to monitor.

## 2020-03-05 NOTE — ED PROVIDER NOTES
Pt presents to the ED c/o cough productive of green sputum that began a few days ago and worsened yesterday. Pt is also c/o fever (Tmax 101F), SOA and chest congestion. Pt reports being seen at [facility] today for same sx where she had a chest XR (resuls pending). She denies recent known sick contacts. Pt reports using multiple albuterol breathing treatments without improvement in cough or SOA.      On exam:  General: Awake, alert, no acute distress  HEENT: Mucous membranes moist, atraumatic, normocephalic, EOMI  Neck: Full ROM  Pulm: Symmetric, diffuse rales and rhonchi bilaterally with mild tachypnea   Cardiovascular: Regular rhythm tachycardia, normal S1/S2, intact distal pulses  GI: Soft, non-tender, non-distended, no rebound, no guarding, bowel sounds present  MSK: Full ROM, no deformity  Skin: Warm, dry  Neuro: Alert and oriented x 3, GCS 15, moving all extremities, no focal deficits  Psych: Calm, cooperative       Plan: I agree with the plan to admit patient for pneumonia and she does technically meet sepsis criteria.  Patient family been updated the course and plan the need for hospital stay.     Attestation:  The ARUN and I have discussed this patient's history, physical exam, and treatment plan.  I have reviewed the documentation and personally had a face to face interaction with the patient. I affirm the documentation and agree with the treatment and plan.  The attached note describes my personal findings.       Documentation assistance provided by chelsey Mckeon for . Information recorded by the rolandoe was done at my direction and has been verified and validated by me.       Fela Mckeon  03/05/20 0030       Jonathan Sheehan MD  03/05/20 4570

## 2020-03-05 NOTE — ED NOTES
"Pt c/o cough, fever, and SOA x3 days. States cough is productive with green-yellow mucus. States has had \"tightness in my chest.\" States SOA worse after coughing, talking, or other acitivity. 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 States has been taking aspirin TID for her fever. Speaking in full sentences, but appears mildly SOA after extended talking. Respirations even and unlabored, NAD.     Jenn Morejon, RN  03/04/20 2204    "

## 2020-03-06 LAB
ALBUMIN SERPL-MCNC: 3.2 G/DL (ref 3.5–5.2)
ANION GAP SERPL CALCULATED.3IONS-SCNC: 13.1 MMOL/L (ref 5–15)
BASOPHILS # BLD AUTO: 0.02 10*3/MM3 (ref 0–0.2)
BASOPHILS NFR BLD AUTO: 0.2 % (ref 0–1.5)
BUN BLD-MCNC: 10 MG/DL (ref 8–23)
BUN/CREAT SERPL: 16.7 (ref 7–25)
CALCIUM SPEC-SCNC: 8.7 MG/DL (ref 8.6–10.5)
CHLORIDE SERPL-SCNC: 97 MMOL/L (ref 98–107)
CO2 SERPL-SCNC: 24.9 MMOL/L (ref 22–29)
CREAT BLD-MCNC: 0.6 MG/DL (ref 0.57–1)
DEPRECATED RDW RBC AUTO: 38.6 FL (ref 37–54)
EOSINOPHIL # BLD AUTO: 0.15 10*3/MM3 (ref 0–0.4)
EOSINOPHIL NFR BLD AUTO: 1.3 % (ref 0.3–6.2)
ERYTHROCYTE [DISTWIDTH] IN BLOOD BY AUTOMATED COUNT: 12.2 % (ref 12.3–15.4)
GFR SERPL CREATININE-BSD FRML MDRD: 96 ML/MIN/1.73
GLUCOSE BLD-MCNC: 123 MG/DL (ref 65–99)
HCT VFR BLD AUTO: 29.7 % (ref 34–46.6)
HGB BLD-MCNC: 10.1 G/DL (ref 12–15.9)
IMM GRANULOCYTES # BLD AUTO: 0.05 10*3/MM3 (ref 0–0.05)
IMM GRANULOCYTES NFR BLD AUTO: 0.4 % (ref 0–0.5)
LYMPHOCYTES # BLD AUTO: 1.24 10*3/MM3 (ref 0.7–3.1)
LYMPHOCYTES NFR BLD AUTO: 10.7 % (ref 19.6–45.3)
MCH RBC QN AUTO: 29.4 PG (ref 26.6–33)
MCHC RBC AUTO-ENTMCNC: 34 G/DL (ref 31.5–35.7)
MCV RBC AUTO: 86.6 FL (ref 79–97)
MONOCYTES # BLD AUTO: 1.19 10*3/MM3 (ref 0.1–0.9)
MONOCYTES NFR BLD AUTO: 10.2 % (ref 5–12)
NEUTROPHILS # BLD AUTO: 8.96 10*3/MM3 (ref 1.7–7)
NEUTROPHILS NFR BLD AUTO: 77.2 % (ref 42.7–76)
NRBC BLD AUTO-RTO: 0 /100 WBC (ref 0–0.2)
PHOSPHATE SERPL-MCNC: 2.9 MG/DL (ref 2.5–4.5)
PLATELET # BLD AUTO: 226 10*3/MM3 (ref 140–450)
PMV BLD AUTO: 9.2 FL (ref 6–12)
POTASSIUM BLD-SCNC: 4 MMOL/L (ref 3.5–5.2)
PROCALCITONIN SERPL-MCNC: 0.24 NG/ML (ref 0.1–0.25)
RBC # BLD AUTO: 3.43 10*6/MM3 (ref 3.77–5.28)
SODIUM BLD-SCNC: 135 MMOL/L (ref 136–145)
WBC NRBC COR # BLD: 11.61 10*3/MM3 (ref 3.4–10.8)

## 2020-03-06 PROCEDURE — 94799 UNLISTED PULMONARY SVC/PX: CPT

## 2020-03-06 PROCEDURE — 94668 MNPJ CHEST WALL SBSQ: CPT

## 2020-03-06 PROCEDURE — 80069 RENAL FUNCTION PANEL: CPT | Performed by: INTERNAL MEDICINE

## 2020-03-06 PROCEDURE — 85025 COMPLETE CBC W/AUTO DIFF WBC: CPT | Performed by: INTERNAL MEDICINE

## 2020-03-06 PROCEDURE — 94669 MECHANICAL CHEST WALL OSCILL: CPT

## 2020-03-06 PROCEDURE — 94640 AIRWAY INHALATION TREATMENT: CPT

## 2020-03-06 PROCEDURE — 84145 PROCALCITONIN (PCT): CPT | Performed by: INTERNAL MEDICINE

## 2020-03-06 PROCEDURE — 36415 COLL VENOUS BLD VENIPUNCTURE: CPT | Performed by: INTERNAL MEDICINE

## 2020-03-06 RX ORDER — LEVOFLOXACIN 750 MG/1
750 TABLET ORAL EVERY 24 HOURS
Status: DISCONTINUED | OUTPATIENT
Start: 2020-03-06 | End: 2020-03-08 | Stop reason: HOSPADM

## 2020-03-06 RX ADMIN — BUDESONIDE AND FORMOTEROL FUMARATE DIHYDRATE 2 PUFF: 80; 4.5 AEROSOL RESPIRATORY (INHALATION) at 09:06

## 2020-03-06 RX ADMIN — IPRATROPIUM BROMIDE AND ALBUTEROL SULFATE 3 ML: 2.5; .5 SOLUTION RESPIRATORY (INHALATION) at 20:37

## 2020-03-06 RX ADMIN — SODIUM CHLORIDE, PRESERVATIVE FREE 10 ML: 5 INJECTION INTRAVENOUS at 08:35

## 2020-03-06 RX ADMIN — SODIUM CHLORIDE, PRESERVATIVE FREE 10 ML: 5 INJECTION INTRAVENOUS at 22:59

## 2020-03-06 RX ADMIN — LEVOFLOXACIN 750 MG: 750 TABLET, FILM COATED ORAL at 17:32

## 2020-03-06 RX ADMIN — IPRATROPIUM BROMIDE AND ALBUTEROL SULFATE 3 ML: 2.5; .5 SOLUTION RESPIRATORY (INHALATION) at 15:29

## 2020-03-06 RX ADMIN — PANTOPRAZOLE SODIUM 40 MG: 40 TABLET, DELAYED RELEASE ORAL at 06:25

## 2020-03-06 NOTE — PROGRESS NOTES
"  Daily Progress Note.   [facility] EMERGENCY DEPARTMENT  3/6/2020    Patient:  Name:  Jenniffer Dumont  MRN:  [id number]  1940  79 y.o.  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  No hemoptysis    ROS: No fever, no diarrhea, no chest pain  PMFSSH: no change    Physical Exam:  /80 (BP Location: Right arm, Patient Position: Sitting)   Pulse 108   Temp 99.3 °F (37.4 °C) (Oral)   Resp 16   Ht 157.5 cm (62\")   Wt 61.6 kg (135 lb 11.2 oz)   SpO2 96%   BMI 24.82 kg/m²   Body mass index is 24.82 kg/m².    Intake/Output Summary (Last 24 hours) at 3/6/2020 1608  Last data filed at 3/5/2020 2034  Gross per 24 hour   Intake 240 ml   Output --   Net 240 ml     General appearance: NAD, conversant   Eyes: anicteric sclerae, moist conjunctivae; no lid-lag; PERRLA  HENT: Atraumatic; oropharynx clear with moist mucous membranes and no mucosal ulcerations; normal hard and soft palate  Neck: Trachea midline; FROM, supple, no thyromegaly or lymphadenopathy  Lungs: symmetric air entry with some int rhonchi, with normal respiratory effort and no intercostal retractions  CV: RRR, no MRGs   Abdomen: Soft, non-tender; no masses or HSM  Extremities: No peripheral edema or extremity lymphadenopathy  Skin: Normal temperature, turgor and texture; no rash, ulcers or subcutaneous nodules  Psych: Appropriate affect, alert and oriented to person, place and time    Data Review:  Notable Labs:  Results from last 7 days   Lab Units 03/06/20  0622 03/04/20  2104   WBC 10*3/mm3 11.61* 15.39*   HEMOGLOBIN g/dL " 10.1* 12.1   PLATELETS 10*3/mm3 226 259     Results from last 7 days   Lab Units 03/06/20  0622 03/04/20  2104   SODIUM mmol/L 135* 131*   POTASSIUM mmol/L 4.0 3.8   CHLORIDE mmol/L 97* 92*   CO2 mmol/L 24.9 24.6   BUN mg/dL 10 12   CREATININE mg/dL 0.60 0.82   GLUCOSE mg/dL 123* 119*   CALCIUM mg/dL 8.7 9.1   PHOSPHORUS mg/dL 2.9  --    Estimated Creatinine Clearance: 47.6 mL/min (by C-G formula based on SCr of 0.6 mg/dL).    Imaging:  Reviewed chest images personally from past 3 days    Scheduled meds:      budesonide-formoterol 2 puff Inhalation BID - RT   cetirizine 5 mg Oral Daily   fluticasone 2 spray Each Nare Daily   ipratropium-albuterol 3 mL Nebulization 4x Daily - RT   levoFLOXacin 750 mg Oral Q24H   pantoprazole 40 mg Oral QAM   sodium chloride 10 mL Intravenous Q12H       ASSESSMENT  /  PLAN:  Community-acquired pneumonia  Sepsis improved  Bronchiectasis  History of MAC  History of MDR stenotrophomonas   H/o nocardia  HLD  GERD    Abx to po levaquin  Cont pantoprazole  duonebs 4 x daily  symbicort two puffs bid  Follow cultures AFB and sputum  Discuss with pt bronchoscopy rec to proceed.  She had a bad experience previously and doesn't want at this time unless absolutely needed.  We will see what sputum cultures show afb, bacteria  levaquin  Cont chest physiotherapy  Cont pantoprazole for GERD    Cont flutter.    See what sputum culture grows and clinical response to above.  Patient retired RN  Dispo a few days?            Amado Aguiar MD  [facility]  03/06/20  4:08 PM

## 2020-03-06 NOTE — PROGRESS NOTES
Discharge Planning Assessment  [facility]     Patient Name: Jenniffer Dumont  MRN: [id number]  Today's Date: 3/6/2020    Admit Date: 3/4/2020    Discharge Needs Assessment     Row Name 03/06/20 1141       Living Environment    Lives With  spouse    Name(s) of Who Lives With Patient  Spouse, Jordy Dumont.    Current Living Arrangements  home/apartment/condo    Primary Care Provided by  self    Provides Primary Care For  no one    Family Caregiver if Needed  spouse    Family Caregiver Names  Spouse, Jordy Dumont.    Quality of Family Relationships  helpful;supportive    Able to Return to Prior Arrangements  yes       Resource/Environmental Concerns    Resource/Environmental Concerns  none       Transition Planning    Patient/Family Anticipates Transition to  home    Patient/Family Anticipated Services at Transition  none    Transportation Anticipated  family or friend will provide       Discharge Needs Assessment    Readmission Within the Last 30 Days  no previous admission in last 30 days    Concerns to be Addressed  denies needs/concerns at this time    Equipment Currently Used at Home  none    Anticipated Changes Related to Illness  none    Equipment Needed After Discharge  none    Provided Post Acute Provider List?  Refused    Refused Provider List Comment  Plan to return home independently with spouse; denied need for facility information.        Discharge Plan     Row Name 03/06/20 1146       Plan    Plan  Plan to return home independently with spouse; transportation to be provided by family/spouse.    Provided Post Acute Provider List?  Refused    Refused Provider List Comment  Plan to return home independently with spouse; denied need for facility information.    Provided Post Acute Provider Quality & Resource List?  Refused    Refused Quality and Resource List Comment  Plan to return home independently with spouse; denied need for facility information.    Patient/Family in Agreement with Plan  yes    Plan  Comments  Entered room, identified self, explained role, and verified facesheet information.  Patient awake, texting via cellphone, pleasant and cooperative with interview.  Patient resides in two-story house with basement with her ; there are approx. 13 stairs to the upper bedrooms and to the basement, and two stairs to the home entrance.  Patient denies difficulties ambulating stairs/uses rails as needed, although does report baseline DYKES while moving up the stairs.  Patient reports regular participation in exercise, including yoga 2x/week and nearly daily walks with her dog.  Patient reports independence with all ADLs, although she does use a housekeeping service at times to take care of large cleaning issues in the house.  Medications are filled at [facility] in [place]; there are no financial issues in obtaining any meds.  Patient indicates that she is ambulatory without any assistive equipment; denies ownership of any DME, denies O2 usage.  BR has a standard commode and walk-in shower.  Patient denies ER visits or admissions within the last thirty days.  Patient does verbalize history of Home Health experience approx. six years ago for IV ABX post-pulmonary infection.  Patient denies inpatient rehab experience.  Patient confirms plan to return home upon discharge with  providing transportation; denies need for post-acute provider information.           Destination      Coordination has not been started for this encounter.      Durable Medical Equipment      Coordination has not been started for this encounter.      Dialysis/Infusion      Coordination has not been started for this encounter.      Home Medical Care      Coordination has not been started for this encounter.      Therapy      Coordination has not been started for this encounter.      Community Resources      Coordination has not been started for this encounter.          Demographic Summary     Row Name 03/06/20 1137       [facility]  Information    Admission Type  inpatient    Arrived From  home    Reason for Consult  discharge planning    Preferred Language  English     Used During This Interaction  no       Contact Information    Permission Granted to Share Info With      Contact Information Obtained for             Name,   Gene Meade RN    Phone,   [phone]        Functional Status     Row Name 03/06/20 1139       Functional Status    Usual Activity Tolerance  good    Current Activity Tolerance  moderate    Functional Status Comments  Patient reports baseline DYKES due to chronic pulmonary disease, but denies other difficulties.        Functional Status, IADL    Medications  independent Medications filled at [facility] in [place].    Meal Preparation  independent    Housekeeping  other (see comments) Reports usage of housekeeping service at times.    Laundry  independent    Shopping  independent       Mental Status Summary    Recent Changes in Mental Status/Cognitive Functioning  no changes        Psychosocial    No documentation.       Abuse/Neglect    No documentation.       Legal    No documentation.       Substance Abuse    No documentation.       Patient Forms    No documentation.           Gene Meade RN

## 2020-03-07 LAB
ALBUMIN SERPL-MCNC: 3.6 G/DL (ref 3.5–5.2)
ANION GAP SERPL CALCULATED.3IONS-SCNC: 16.3 MMOL/L (ref 5–15)
BACTERIA SPEC RESP CULT: NORMAL
BASOPHILS # BLD AUTO: 0.05 10*3/MM3 (ref 0–0.2)
BASOPHILS NFR BLD AUTO: 0.5 % (ref 0–1.5)
BUN BLD-MCNC: 9 MG/DL (ref 8–23)
BUN/CREAT SERPL: 14.3 (ref 7–25)
CALCIUM SPEC-SCNC: 9.2 MG/DL (ref 8.6–10.5)
CHLORIDE SERPL-SCNC: 92 MMOL/L (ref 98–107)
CO2 SERPL-SCNC: 24.7 MMOL/L (ref 22–29)
CREAT BLD-MCNC: 0.63 MG/DL (ref 0.57–1)
DEPRECATED RDW RBC AUTO: 40.7 FL (ref 37–54)
EOSINOPHIL # BLD AUTO: 0.22 10*3/MM3 (ref 0–0.4)
EOSINOPHIL NFR BLD AUTO: 2 % (ref 0.3–6.2)
ERYTHROCYTE [DISTWIDTH] IN BLOOD BY AUTOMATED COUNT: 12.6 % (ref 12.3–15.4)
GFR SERPL CREATININE-BSD FRML MDRD: 91 ML/MIN/1.73
GLUCOSE BLD-MCNC: 117 MG/DL (ref 65–99)
GRAM STN SPEC: NORMAL
HCT VFR BLD AUTO: 33.3 % (ref 34–46.6)
HGB BLD-MCNC: 11.3 G/DL (ref 12–15.9)
IMM GRANULOCYTES # BLD AUTO: 0.07 10*3/MM3 (ref 0–0.05)
IMM GRANULOCYTES NFR BLD AUTO: 0.6 % (ref 0–0.5)
LYMPHOCYTES # BLD AUTO: 1.49 10*3/MM3 (ref 0.7–3.1)
LYMPHOCYTES NFR BLD AUTO: 13.8 % (ref 19.6–45.3)
MCH RBC QN AUTO: 30 PG (ref 26.6–33)
MCHC RBC AUTO-ENTMCNC: 33.9 G/DL (ref 31.5–35.7)
MCV RBC AUTO: 88.3 FL (ref 79–97)
MONOCYTES # BLD AUTO: 1.19 10*3/MM3 (ref 0.1–0.9)
MONOCYTES NFR BLD AUTO: 11 % (ref 5–12)
NEUTROPHILS # BLD AUTO: 7.79 10*3/MM3 (ref 1.7–7)
NEUTROPHILS NFR BLD AUTO: 72.1 % (ref 42.7–76)
NRBC BLD AUTO-RTO: 0 /100 WBC (ref 0–0.2)
PHOSPHATE SERPL-MCNC: 3 MG/DL (ref 2.5–4.5)
PLATELET # BLD AUTO: 312 10*3/MM3 (ref 140–450)
PMV BLD AUTO: 9.7 FL (ref 6–12)
POTASSIUM BLD-SCNC: 4.1 MMOL/L (ref 3.5–5.2)
RBC # BLD AUTO: 3.77 10*6/MM3 (ref 3.77–5.28)
SODIUM BLD-SCNC: 133 MMOL/L (ref 136–145)
WBC NRBC COR # BLD: 10.81 10*3/MM3 (ref 3.4–10.8)

## 2020-03-07 PROCEDURE — 94799 UNLISTED PULMONARY SVC/PX: CPT

## 2020-03-07 PROCEDURE — 94669 MECHANICAL CHEST WALL OSCILL: CPT

## 2020-03-07 PROCEDURE — 94668 MNPJ CHEST WALL SBSQ: CPT

## 2020-03-07 PROCEDURE — 80069 RENAL FUNCTION PANEL: CPT | Performed by: INTERNAL MEDICINE

## 2020-03-07 PROCEDURE — 85025 COMPLETE CBC W/AUTO DIFF WBC: CPT | Performed by: INTERNAL MEDICINE

## 2020-03-07 RX ADMIN — LEVOFLOXACIN 750 MG: 750 TABLET, FILM COATED ORAL at 17:58

## 2020-03-07 RX ADMIN — SODIUM CHLORIDE, PRESERVATIVE FREE 10 ML: 5 INJECTION INTRAVENOUS at 22:30

## 2020-03-07 RX ADMIN — SODIUM CHLORIDE, PRESERVATIVE FREE 10 ML: 5 INJECTION INTRAVENOUS at 09:21

## 2020-03-07 RX ADMIN — BUDESONIDE AND FORMOTEROL FUMARATE DIHYDRATE 2 PUFF: 80; 4.5 AEROSOL RESPIRATORY (INHALATION) at 20:16

## 2020-03-07 RX ADMIN — BUDESONIDE AND FORMOTEROL FUMARATE DIHYDRATE 2 PUFF: 80; 4.5 AEROSOL RESPIRATORY (INHALATION) at 10:28

## 2020-03-07 RX ADMIN — IPRATROPIUM BROMIDE AND ALBUTEROL SULFATE 3 ML: 2.5; .5 SOLUTION RESPIRATORY (INHALATION) at 17:14

## 2020-03-07 RX ADMIN — IPRATROPIUM BROMIDE AND ALBUTEROL SULFATE 3 ML: 2.5; .5 SOLUTION RESPIRATORY (INHALATION) at 14:06

## 2020-03-07 NOTE — PROGRESS NOTES
"Patient stated she wanted to take Symbicort and Duoneb together -explained Hospital policy and explained reasons for not taking together.Requested to start therapy prior to scanning or charting - explained scanning the patient and charting needed to be done prior to therapy.The vest patient has in room is one size too small for patient - explained that I would trade out for proper size. Patient was not happy about this and stated -\"Well its the one I have always used and there is nothing wrong with it.\"-Reported all conversation to nurse at this time.  "

## 2020-03-07 NOTE — PROGRESS NOTES
[facility]  [phone]  Ananda Williamson MD    Subjective:  LOS: 2    Initially she was keen to leave today.  However after our conversation she does not wish to go home today as her  is quite elderly and would not be able to pick her up. She states he has been very well until this episode.  She does not have a vest at home.  She does have a flutter valve and nebulizer.  .  Objective   Vital Signs past 24hrs    Temp range: Temp (24hrs), Av.4 °F (36.9 °C), Min:97.8 °F (36.6 °C), Max:99.1 °F (37.3 °C)    BP range: BP: (126-173)/(88-92) 127/89  Pulse range: Heart Rate:  [] 92  Resp rate range: Resp:  [16-22] 20    Device (Oxygen Therapy): room air   Oxygen range:SpO2:  [95 %-97 %] 97 %      61.6 kg (135 lb 11.2 oz); Body mass index is 24.82 kg/m².  No intake or output data in the 24 hours ending 20 1725    Physical Exam   Constitutional: She appears well-developed.   HENT:   Head: Normocephalic.   Cardiovascular: Normal rate and regular rhythm.   No murmur heard.  Pulmonary/Chest: Effort normal. No respiratory distress. She has no wheezes. She has rales.   Abdominal: Soft. Bowel sounds are normal. She exhibits no mass. There is no tenderness.   Musculoskeletal: She exhibits no edema.   Skin: No rash noted.     Results Review:    I have reviewed the laboratory and imaging data since the last note by [facility] physician.  My annotations are noted in assessment and plan.    Medication Review:  I have reviewed the current MAR.  My annotations are noted in assessment and plan.       Plan   PCCM Problems  Community-acquired pneumonia  Sepsis improved  Bronchiectasis with exacerbation  History of MAC  History of MDR stenotrophomonas   H/o nocardia  HLD  GERD    THESE ARE NEW MEDICAL PROBLEMS TO ME.    Plan of Treatment  I looked at her current CT and compared to previous.  The lingula and right middle lobe appeared to be progressive changes.  This could certainly be acute consolidation in these  areas or a progression of changes over a period of time.  In either case she needs aggressive pulmonary toilet.  She needs consideration of bronchoscopy which can be done as an outpatient.  I discussed optimal therapy with her.  She should be on a vest treatment at least for 3 months.  We can arrange this on Monday.  She is willing to wait for this.  She is currently on antibiotics and is on oral levofloxacin.  She is on nebs 4 times a day.  She requires a new nebulizer and set up with a vest on discharge.  If she decides to go home tomorrow we will need to arrange this through our office.    Ananda Williamson MD  03/07/20  5:25 PM    Part of this note may be an electronic transcription/translation of spoken language to printed text using the Dragon Dictation System.

## 2020-03-07 NOTE — PROGRESS NOTES
Patient states she wants treatments at exact times. Explained hospital policy regarding this and explained that it would be impossible to guarantee an exact arrival time but, that we would try our best. Patient is not happy with this answer-reported and passed this information to PM shift.

## 2020-03-08 VITALS
SYSTOLIC BLOOD PRESSURE: 112 MMHG | BODY MASS INDEX: 24.97 KG/M2 | HEIGHT: 62 IN | WEIGHT: 135.7 LBS | DIASTOLIC BLOOD PRESSURE: 74 MMHG | HEART RATE: 87 BPM | TEMPERATURE: 98.3 F | OXYGEN SATURATION: 95 % | RESPIRATION RATE: 18 BRPM

## 2020-03-08 PROBLEM — E87.1 ACUTE HYPONATREMIA: Status: ACTIVE | Noted: 2020-03-08

## 2020-03-08 LAB
ALBUMIN SERPL-MCNC: 3.2 G/DL (ref 3.5–5.2)
ANION GAP SERPL CALCULATED.3IONS-SCNC: 12.8 MMOL/L (ref 5–15)
BASOPHILS # BLD AUTO: 0.07 10*3/MM3 (ref 0–0.2)
BASOPHILS NFR BLD AUTO: 0.7 % (ref 0–1.5)
BUN BLD-MCNC: 9 MG/DL (ref 8–23)
BUN/CREAT SERPL: 16.7 (ref 7–25)
CALCIUM SPEC-SCNC: 8.9 MG/DL (ref 8.6–10.5)
CHLORIDE SERPL-SCNC: 91 MMOL/L (ref 98–107)
CO2 SERPL-SCNC: 24.2 MMOL/L (ref 22–29)
CREAT BLD-MCNC: 0.54 MG/DL (ref 0.57–1)
DEPRECATED RDW RBC AUTO: 38.1 FL (ref 37–54)
EOSINOPHIL # BLD AUTO: 0.29 10*3/MM3 (ref 0–0.4)
EOSINOPHIL NFR BLD AUTO: 2.8 % (ref 0.3–6.2)
ERYTHROCYTE [DISTWIDTH] IN BLOOD BY AUTOMATED COUNT: 11.9 % (ref 12.3–15.4)
GFR SERPL CREATININE-BSD FRML MDRD: 108 ML/MIN/1.73
GLUCOSE BLD-MCNC: 123 MG/DL (ref 65–99)
HCT VFR BLD AUTO: 32.6 % (ref 34–46.6)
HGB BLD-MCNC: 10.8 G/DL (ref 12–15.9)
IMM GRANULOCYTES # BLD AUTO: 0.09 10*3/MM3 (ref 0–0.05)
IMM GRANULOCYTES NFR BLD AUTO: 0.9 % (ref 0–0.5)
LYMPHOCYTES # BLD AUTO: 1.71 10*3/MM3 (ref 0.7–3.1)
LYMPHOCYTES NFR BLD AUTO: 16.2 % (ref 19.6–45.3)
MCH RBC QN AUTO: 29.1 PG (ref 26.6–33)
MCHC RBC AUTO-ENTMCNC: 33.1 G/DL (ref 31.5–35.7)
MCV RBC AUTO: 87.9 FL (ref 79–97)
MONOCYTES # BLD AUTO: 1.29 10*3/MM3 (ref 0.1–0.9)
MONOCYTES NFR BLD AUTO: 12.3 % (ref 5–12)
NEUTROPHILS # BLD AUTO: 7.08 10*3/MM3 (ref 1.7–7)
NEUTROPHILS NFR BLD AUTO: 67.1 % (ref 42.7–76)
NRBC BLD AUTO-RTO: 0 /100 WBC (ref 0–0.2)
PHOSPHATE SERPL-MCNC: 3.5 MG/DL (ref 2.5–4.5)
PLATELET # BLD AUTO: 299 10*3/MM3 (ref 140–450)
PMV BLD AUTO: 9.3 FL (ref 6–12)
POTASSIUM BLD-SCNC: 3.8 MMOL/L (ref 3.5–5.2)
RBC # BLD AUTO: 3.71 10*6/MM3 (ref 3.77–5.28)
SODIUM BLD-SCNC: 128 MMOL/L (ref 136–145)
WBC NRBC COR # BLD: 10.53 10*3/MM3 (ref 3.4–10.8)

## 2020-03-08 PROCEDURE — 94799 UNLISTED PULMONARY SVC/PX: CPT

## 2020-03-08 PROCEDURE — 94669 MECHANICAL CHEST WALL OSCILL: CPT

## 2020-03-08 PROCEDURE — 85025 COMPLETE CBC W/AUTO DIFF WBC: CPT | Performed by: INTERNAL MEDICINE

## 2020-03-08 PROCEDURE — 80069 RENAL FUNCTION PANEL: CPT | Performed by: INTERNAL MEDICINE

## 2020-03-08 PROCEDURE — 36415 COLL VENOUS BLD VENIPUNCTURE: CPT | Performed by: INTERNAL MEDICINE

## 2020-03-08 RX ORDER — LEVOFLOXACIN 750 MG/1
750 TABLET ORAL EVERY 24 HOURS
Qty: 4 TABLET | Refills: 0 | Status: SHIPPED | OUTPATIENT
Start: 2020-03-08 | End: 2020-03-12

## 2020-03-08 RX ADMIN — IPRATROPIUM BROMIDE AND ALBUTEROL SULFATE 3 ML: 2.5; .5 SOLUTION RESPIRATORY (INHALATION) at 11:19

## 2020-03-08 RX ADMIN — BUDESONIDE AND FORMOTEROL FUMARATE DIHYDRATE 2 PUFF: 80; 4.5 AEROSOL RESPIRATORY (INHALATION) at 07:18

## 2020-03-08 NOTE — PLAN OF CARE
A+0x4.  Anxious for d/c.  Dr. Williamson aware.  Requesting f/o w/ Dr. Amado Aguiar in office sooner than 2 weeks and vest and orders for chest physiotherapy once home.  No c/o pain.  Continue to monitor and progress towards goals as tolerated.

## 2020-03-08 NOTE — DISCHARGE SUMMARY
"Date of Admission: 3/4/2020  Date of Discharge:  3/8/2020    Discharge Diagnosis:    Community-acquired pneumonia  Sepsis improved  Bronchiectasis with exacerbation  Acute hyponatremia  History of MAC  History of MDR stenotrophomonas   H/o nocardia  HLD  GERD    Hospital Course    Presenting Problem/History of Present Illness    79-year-old female follows my partner Dr. Amado Beaulieu with known history of bronchiectasis with history of MARIAMA.  She presented to the emergency room with cough productive of purulent sputum.  This is been worsening for the last couple of days.  She had associated shortness of breath and chest congestion.  She also reported fever up to 101.  No aspiration was reported.  She is on aggressive pulmonary toilet with bronchodilators at home and despite aggressive bronchodilators she did not improve.  No hemoptysis as such was reported currently.    Subsequent Course of Management    79-year-old female who presented with cough chest congestion.  CT chest reviewed and showed consolidation in the right middle lobe and lingula.  Started on treatment for community-acquired pneumonia.  Bronchoscopy offered but patient declined.  I took over care on 7 March.  Patient came to go home.  Explained that she requires aggressive chest therapy.  She does not have a vest at home but is willing to use.  She will has been on a flutter valve.  She decided to stay 1 extra day.  However today she is keen to be discharged and would like an early follow-up with Dr. Amado Beaulieu.  We will also try and arrange a vest device through our office.  Her sodium level is low today and she will need to have this checked again by her primary care physician.  Patient is very keen to leave the hospital.  She was asked to take extra salt when she gets home.    Examination on Date of Discharge    /74 (BP Location: Right arm, Patient Position: Lying)   Pulse 87   Temp 98.3 °F (36.8 °C) (Oral)   Resp 18   Ht 157.5 cm (62\") "   Wt 61.6 kg (135 lb 11.2 oz)   SpO2 95%   BMI 24.82 kg/m²     Physical Exam   Constitutional: She appears well-developed.   Eyes: Pupils are equal, round, and reactive to light.   Cardiovascular: Normal rate and regular rhythm.   No murmur heard.  Pulmonary/Chest: Effort normal. No respiratory distress. She has no wheezes. She has no rales (no rales today).   Abdominal: Soft. Bowel sounds are normal. She exhibits no mass. There is no tenderness.   Musculoskeletal: She exhibits no edema.   Skin: No rash noted.       Test Results               Results from last 7 days   Lab Units 03/08/20  0637 03/07/20  0530 03/06/20 0622 03/04/20 2104   WBC 10*3/mm3 10.53 10.81* 11.61* 15.39*   HEMOGLOBIN g/dL 10.8* 11.3* 10.1* 12.1   HEMATOCRIT % 32.6* 33.3* 29.7* 35.5   PLATELETS 10*3/mm3 299 312 226 259       Results from last 7 days   Lab Units 03/08/20  0637 03/07/20  0530 03/06/20 0622 03/04/20 2104   SODIUM mmol/L 128* 133* 135* 131*   POTASSIUM mmol/L 3.8 4.1 4.0 3.8   CHLORIDE mmol/L 91* 92* 97* 92*   CO2 mmol/L 24.2 24.7 24.9 24.6   BUN mg/dL 9 9 10 12   CREATININE mg/dL 0.54* 0.63 0.60 0.82       Results from last 7 days   Lab Units 03/04/20 2104   TROPONIN T ng/mL <0.010       Microbiology Results (last 10 days)     Procedure Component Value - Date/Time    AFB Culture - Sputum, Cough [[id number]] Collected:  03/05/20 1751    Lab Status:  Preliminary result Specimen:  Sputum from Cough Updated:  03/06/20 1945     AFB Stain No acid fast bacilli seen on concentrated smear    Respiratory Culture - Sputum, Cough [[id number]] Collected:  03/05/20 1751    Lab Status:  Final result Specimen:  Sputum from Cough Updated:  03/07/20 0933     Respiratory Culture Scant growth (1+) Normal Respiratory Amy     Gram Stain Few (2+) WBCs per low power field      Rare (1+) Epithelial cells per low power field      Rare (1+) Mixed bacterial morphotypes seen on Gram Stain    Blood Culture - Blood, Arm, Left [[id number]] Collected:   03/04/20 2311    Lab Status:  Preliminary result Specimen:  Blood from Arm, Left Updated:  03/07/20 2330     Blood Culture No growth at 3 days    Blood Culture - Blood, Arm, Left [[id number]] Collected:  03/04/20 2251    Lab Status:  Preliminary result Specimen:  Blood from Arm, Left Updated:  03/07/20 2315     Blood Culture No growth at 3 days    Respiratory Panel, PCR - Swab, Nasopharynx [[id number]]  (Normal) Collected:  03/04/20 2212    Lab Status:  Final result Specimen:  Swab from Nasopharynx Updated:  03/04/20 2324     ADENOVIRUS, PCR Not Detected     Coronavirus 229E Not Detected     Coronavirus HKU1 Not Detected     Coronavirus NL63 Not Detected     Coronavirus OC43 Not Detected     Human Metapneumovirus Not Detected     Human Rhinovirus/Enterovirus Not Detected     Influenza B PCR Not Detected     Parainfluenza Virus 1 Not Detected     Parainfluenza Virus 2 Not Detected     Parainfluenza Virus 3 Not Detected     Parainfluenza Virus 4 Not Detected     Bordetella pertussis pcr Not Detected     Influenza A H1 2009 PCR Not Detected     Chlamydophila pneumoniae PCR Not Detected     Mycoplasma pneumo by PCR Not Detected     Influenza A PCR Not Detected     Influenza A H3 Not Detected     Influenza A H1 Not Detected     RSV, PCR Not Detected     Bordetella parapertussis PCR Not Detected          Xr Chest 2 View    Result Date: 3/4/2020  PA AND LATERAL CHEST X-RAY 03/04/2020  CLINICAL HISTORY: Patient has cough, has bronchiectasis without complication, MARIAMA (Mycobacterium avium intracellulare).  COMPARISON:  This is correlated to prior chest CTs 12/03/2013, a CT angiogram of the chest 05/12/2018 and prior PA and lateral chest x-ray 04/29/2016.  FINDINGS:  The cardiomediastinal silhouette and the pulmonary vasculature are within normal limits. There is patchy parenchymal opacity at the lung apices and lateral aspect of the right midlung zone just above the right minor fissure and patchy vague opacity over the left  heart border on the PA view that projects over the anterior heart on the lateral view where the patient on prior chest CT had bronchiectasis in the inferior lingular segment of the left upper lobe with adjacent atelectatic lung and there are also similar findings in the right middle lobe. Findings appear very similar to prior chest x-ray 04/29/2016 and prior CT of the chest on 05/12/2018. Costophrenic angles are sharp.      No significant interval change when compared to prior chest x-ray 04/29/2016. There are vague parenchymal opacities in the right middle lobe and lingular segment of left upper lobe where the patient has extensive bronchiectasis and atelectatic lung and chronic opacities in the lateral aspect of the right midlung zone and the lung apices bilaterally all of which appear unchanged. No new abnormality or active disease is seen.  This report was finalized on 3/4/2020 4:38 PM by Dr. Milind Duque M.D.      Ct Chest Without Contrast    Result Date: 3/6/2020  CT CHEST WITHOUT CONTRAST  HISTORY: 79-year-old female with shortness of breath.  TECHNIQUE: Radiation dose reduction techniques were utilized, including automated exposure control and exposure modulation based on body size. 3 mm images were obtained through the chest without the administration of IV contrast. Compared with chest CTA from 05/12/2018.  FINDINGS: There is a dense consolidation superimposed on chronic scarring and bronchiolectasis at the lingula and the right middle lobe has become nearly completely consolidated. There is bronchiectasis within the right middle lobe as well. There are new reticular nodular opacities scattered throughout both lung fields and there are also chronic reticular nodular opacities throughout both lung fields, most prominent at the upper lobes. There is a new tiny left pleural effusion. There is no pericardial effusion. There is no enlargement of multiple mediastinal and hilar nodes. There is a tiny hiatal  hernia.      1. There are acute on chronic findings. There are new reticular nodular opacities superimposed on chronic opacities. There are new consolidations within the lingula and right middle lobe which have chronic scarring and bronchiectasis. There is likely multifocal pneumonia. The appearance can be seen with MAC. 2. New tiny left pleural effusion. 3. New mediastinal and hilar lymphadenopathy is likely reactive. Reevaluation is recommended with a chest CT (preferably with IV contrast) in 3 months.  This report was finalized on 3/6/2020 7:27 AM by Dr. Claire Christianson M.D.        Consulting Physician(s)     Provider Relationship Specialty    Mando Montalvo MD Consulting Physician Pulmonary Disease                 Discharge Instructions      Dietary Orders (From admission, onward)     Start     Ordered    03/06/20 2303  Diet Soft Texture; [employer]  Diet Effective Now     Question Answer Comment   Diet Texture / Consistency Soft Texture    Select Texture: [employer]        03/06/20 2303                        Your medication list      START taking these medications      Instructions Last Dose Given Next Dose Due   levoFLOXacin 750 MG tablet  Commonly known as:  LEVAQUIN      Take 1 tablet by mouth Daily for 4 doses. Indications: Pneumonia          CONTINUE taking these medications      Instructions Last Dose Given Next Dose Due   albuterol (2.5 MG/3ML) 0.083% nebulizer solution  Commonly known as:  PROVENTIL      Take 2.5 mg by nebulization Every 6 (Six) Hours As Needed for Wheezing or Shortness of Air.       budesonide-formoterol 80-4.5 MCG/ACT inhaler  Commonly known as:  SYMBICORT      Inhale 2 puffs Daily.       CALCIUM 500 + D PO      Take 2,000 Units by mouth Daily.       COQ-10 PO      Take by mouth.       esomeprazole 40 MG capsule  Commonly known as:  nexIUM      Take 40 mg by mouth Every Morning Before Breakfast.       fexofenadine 180 MG tablet  Commonly known as:  ALLEGRA      Take by mouth daily.        fluticasone 50 MCG/ACT nasal spray  Commonly known as:  FLONASE      into each nostril.       MAGNESIUM PO      Take  by mouth.       multivitamin with minerals tablet      Take 1 tablet by mouth daily.       Vitamin C 500 MG capsule      Take  by mouth. Three times weekly             Where to Get Your Medications      These medications were sent to [id number] - [place], [place] - [address]  AT [facility] - [phone]  - [phone] FX  [address] , [facility] [address]    Phone:  [phone]   · levoFLOXacin 750 MG tablet         Follow-up Information     Amado Aguiar MD Follow up.    Specialty:  Pulmonary Disease  Why:  Office will call you with appointment.  Contact information:  [address] [place], [place] [id number] [phone]             Glenn Aguiar MD .    Specialty:  Pulmonary Disease  Contact information:  [address] [place], [place] [id number] [phone]                   Additional Instructions for the Follow-ups that You Need to Schedule     Basic Metabolic Panel    Mar 13, 2020 (Approximate)      Fax results to PCP - check on sodium level    Order Comments:  Fax results to PCP - check on sodium level                Condition on Discharge:  Yolande Williamson MD  03/08/20  3:42 PM    Time: I spent over 30mins in the discharge planning of this patient.    Some of this encounter note is an electronic transcription/translation of spoken language to printed text.

## 2020-03-09 ENCOUNTER — READMISSION MANAGEMENT (OUTPATIENT)
Dept: CALL CENTER | Facility: HOSPITAL | Age: 80
End: 2020-03-09

## 2020-03-09 NOTE — PROGRESS NOTES
Case Management Discharge Note      Final Note: Discharged home    Provided Post Acute Provider List?: Refused  Refused Provider List Comment: Plan to return home independently with spouse; denied need for facility information.  Provided Post Acute Provider Quality & Resource List?: Refused  Refused Quality and Resource List Comment: Plan to return home independently with spouse; denied need for facility information.            Transportation Services  Private: Car    Final Discharge Disposition Code: 01 - home or self-care

## 2020-03-09 NOTE — OUTREACH NOTE
Prep Survey      Responses   [facility] facility patient discharged from?  [facility]   Is LACE score < 7 ?  No   Eligibility  Readm Mgmt   Discharge diagnosis  Community-acquired pneumonia   Does the patient have one of the following disease processes/diagnoses(primary or secondary)?  Sepsis   Does the patient have Home health ordered?  No   Is there a DME ordered?  No   Prep survey completed?  Yes          Amanda [place]kson RN

## 2020-03-10 ENCOUNTER — READMISSION MANAGEMENT (OUTPATIENT)
Dept: CALL CENTER | Facility: HOSPITAL | Age: 80
End: 2020-03-10

## 2020-03-10 LAB
BACTERIA SPEC AEROBE CULT: NORMAL
BACTERIA SPEC AEROBE CULT: NORMAL

## 2020-03-10 NOTE — OUTREACH NOTE
Sepsis Week 1 Survey      Responses   [facility] patient discharged from?  [facility]   Does the patient have one of the following disease processes/diagnoses(primary or secondary)?  Sepsis   Is there a successful TCM telephone encounter documented?  No   Week 1 attempt successful?  No   Unsuccessful attempts  Attempt 1          Charlette Vo RN

## 2020-03-12 DIAGNOSIS — A31.0 MAI (MYCOBACTERIUM AVIUM-INTRACELLULARE) (HCC): ICD-10-CM

## 2020-03-12 DIAGNOSIS — R05.9 COUGH: ICD-10-CM

## 2020-03-12 DIAGNOSIS — J47.9 BRONCHIECTASIS WITHOUT COMPLICATION (HCC): ICD-10-CM

## 2020-03-12 RX ORDER — ALBUTEROL SULFATE 2.5 MG/3ML
2.5 SOLUTION RESPIRATORY (INHALATION) EVERY 6 HOURS PRN
Qty: 100 VIAL | Refills: 11 | Status: SHIPPED | OUTPATIENT
Start: 2020-03-12

## 2020-03-13 ENCOUNTER — READMISSION MANAGEMENT (OUTPATIENT)
Dept: CALL CENTER | Facility: HOSPITAL | Age: 80
End: 2020-03-13

## 2020-03-13 ENCOUNTER — TRANSCRIBE ORDERS (OUTPATIENT)
Dept: ADMINISTRATIVE | Facility: HOSPITAL | Age: 80
End: 2020-03-13

## 2020-03-13 DIAGNOSIS — R91.8 PULMONARY INFILTRATE: Primary | ICD-10-CM

## 2020-03-13 NOTE — OUTREACH NOTE
Sepsis Week 1 Survey      Responses   [facility] patient discharged from?  [place]   Does the patient have one of the following disease processes/diagnoses(primary or secondary)?  Sepsis   Is there a successful TCM telephone encounter documented?  No   Week 1 attempt successful?  Yes   Call start time  1316   Revoke  Decline to participate [says is doing well, no need for calls. ]   Call end time  1318          Xenia Rios RN

## 2020-03-16 ENCOUNTER — EPISODE CHANGES (OUTPATIENT)
Dept: CASE MANAGEMENT | Facility: OTHER | Age: 80
End: 2020-03-16

## 2020-03-17 NOTE — PROGRESS NOTES
Patient was admitted to the hospital, she is recovering.  Although x-ray was normal CT scan did show pneumonia.  For this.  Oxygen levels have been stable at home.  Advised her to cancel her Medicare wellness visit.

## 2020-03-19 ENCOUNTER — PATIENT OUTREACH (OUTPATIENT)
Dept: CASE MANAGEMENT | Facility: OTHER | Age: 80
End: 2020-03-19

## 2020-03-19 NOTE — OUTREACH NOTE
Patient Outreach Note    Patient completing nebulizer treatment at this time. Return call scheduled.       Bev Pinto RN  Ambulatory     3/19/2020, 16:52

## 2020-03-20 ENCOUNTER — PATIENT OUTREACH (OUTPATIENT)
Dept: CASE MANAGEMENT | Facility: OTHER | Age: 80
End: 2020-03-20

## 2020-03-20 NOTE — OUTREACH NOTE
Care Coordination Note    Updated patient's  prescription has been approved and pharmacy will dispense with Medicare coverage.     Bev Pinto RN  Ambulatory     3/20/2020, 16:17

## 2020-03-20 NOTE — OUTREACH NOTE
Care Plan Note      Responses   Lifestyle Goals  Routine follow-up with doctor(s), Medication management, Fewer exacerbations   Barriers  Other (See Comment) [Patient has had trouble refilling albuterol nebulizer treatments with pharmacy. ]   Self Management  Medication Adherence   Suggested Appointments  Other (See Comment)   Annual Wellness Visit:   Patient Will Schedule   AWV Materials  Send Materials   Specific Disease Process Teaching  -- [pneumonia management: continue nebulizer treatments, rest, hydrate, follow up with PCP as needed. ]   Other Patient Education/Resources   24/7 [facility] Nurse Call Line   24/7 Nurse Call Line Education Method  Send Materials   Does patient have depression diagnosis?  No   Advanced Directives:  Not Interested At This Time   Ed Visits past 12 months:  1   Hospitalizations past 12 months  None   Discharge destination:  Home   Medication Adherence  Medications understood, Financial reason [Patient's  has been to pharmacy 5 times attempting to refill albuterol nebulizer. ]   Goal Progress  Making Progress Toward Goal(s)   Readiness Scale  7   Confidence Scale  7   Health Literacy  Good        The main concerns and/or symptoms the patient would like to address are: Patient's main concern is that she ran out of albuterol today and cannot get it refilled with Medicare coverage at the [facility] pharmacy in [place].    Education/instruction provided by Care Coordinator: Introduced self, explained ACM RN role and provided contact information. Reviewed patient's follow up care. Patient states she is managing well with her nebulizer and her compression vest for her Bronchiectasis post pneumonia. Patient states she is coughing up large amounts of phlegm and is weak but feeling stronger every day. Patient expressed difficulty getting albuterol filled and requested assistance.  visited pharmacy 5 times and couldn't get it filled. 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     Follow Up Outreach Due: as needed    Bev Pinto RN  Ambulatory     3/20/2020, 16:11

## 2020-03-20 NOTE — OUTREACH NOTE
Care Coordination Note    Spoke with tech at [facility] pharmacy [phone]. Tech needed the date patient began nebulizer treatments (July 2017) for prescription approval. Medication approved by Medicare with $0 co-pay.     Bev Pinto RN  Ambulatory     3/20/2020, 16:15

## 2020-03-30 LAB
Lab: ABNORMAL
MYCOBACTERIUM SPEC CULT: ABNORMAL
NIGHT BLUE STAIN TISS: ABNORMAL

## 2020-05-11 ENCOUNTER — APPOINTMENT (OUTPATIENT)
Dept: CT IMAGING | Facility: HOSPITAL | Age: 80
End: 2020-05-11

## 2020-05-12 ENCOUNTER — HOSPITAL ENCOUNTER (OUTPATIENT)
Dept: CT IMAGING | Facility: HOSPITAL | Age: 80
Discharge: HOME | End: 2020-05-12
Admitting: INTERNAL MEDICINE

## 2020-05-12 DIAGNOSIS — R91.8 PULMONARY INFILTRATE: ICD-10-CM

## 2020-05-12 PROCEDURE — 71250 CT THORAX DX C-: CPT

## 2020-05-13 ENCOUNTER — APPOINTMENT (OUTPATIENT)
Dept: WOMENS IMAGING | Facility: HOSPITAL | Age: 80
End: 2020-05-13

## 2020-05-13 PROCEDURE — 77063 BREAST TOMOSYNTHESIS BI: CPT | Performed by: RADIOLOGY

## 2020-05-13 PROCEDURE — 77067 SCR MAMMO BI INCL CAD: CPT | Performed by: RADIOLOGY

## 2020-10-19 ENCOUNTER — TRANSCRIBE ORDERS (OUTPATIENT)
Dept: PULMONOLOGY | Facility: HOSPITAL | Age: 80
End: 2020-10-19

## 2020-10-19 DIAGNOSIS — K12.0: Primary | ICD-10-CM

## 2020-10-19 DIAGNOSIS — A31.0 MYCOBACTERIUM AVIUM-INTRACELLULARE INFECTION (HCC): ICD-10-CM

## 2020-10-29 ENCOUNTER — HOSPITAL ENCOUNTER (OUTPATIENT)
Dept: CT IMAGING | Facility: HOSPITAL | Age: 80
Discharge: HOME | End: 2020-10-29
Admitting: INTERNAL MEDICINE

## 2020-10-29 DIAGNOSIS — A31.0 MYCOBACTERIUM AVIUM-INTRACELLULARE INFECTION (HCC): ICD-10-CM

## 2020-10-29 PROCEDURE — 71250 CT THORAX DX C-: CPT

## 2021-01-19 ENCOUNTER — TRANSCRIBE ORDERS (OUTPATIENT)
Dept: ADMINISTRATIVE | Facility: HOSPITAL | Age: 81
End: 2021-01-19

## 2021-01-19 DIAGNOSIS — R06.02 SHORT OF BREATH ON EXERTION: Primary | ICD-10-CM

## 2021-01-25 ENCOUNTER — HOSPITAL ENCOUNTER (OUTPATIENT)
Dept: CARDIOLOGY | Facility: HOSPITAL | Age: 81
Discharge: HOME | End: 2021-01-25
Admitting: INTERNAL MEDICINE

## 2021-01-25 VITALS
BODY MASS INDEX: 24.84 KG/M2 | HEIGHT: 62 IN | WEIGHT: 135 LBS | DIASTOLIC BLOOD PRESSURE: 68 MMHG | SYSTOLIC BLOOD PRESSURE: 114 MMHG

## 2021-01-25 DIAGNOSIS — R06.02 SHORT OF BREATH ON EXERTION: ICD-10-CM

## 2021-01-25 LAB
AORTIC DIMENSIONLESS INDEX: 0.8 (DI)
ASCENDING AORTA: 3.6 CM
BH CV ECHO MEAS - ACS: 2 CM
BH CV ECHO MEAS - AO MAX PG (FULL): 2.5 MMHG
BH CV ECHO MEAS - AO MAX PG: 9.5 MMHG
BH CV ECHO MEAS - AO MEAN PG (FULL): 1 MMHG
BH CV ECHO MEAS - AO MEAN PG: 4 MMHG
BH CV ECHO MEAS - AO ROOT AREA (BSA CORRECTED): 2.2
BH CV ECHO MEAS - AO ROOT AREA: 9.6 CM^2
BH CV ECHO MEAS - AO ROOT DIAM: 3.5 CM
BH CV ECHO MEAS - AO V2 MAX: 154 CM/SEC
BH CV ECHO MEAS - AO V2 MEAN: 97.3 CM/SEC
BH CV ECHO MEAS - AO V2 VTI: 29.1 CM
BH CV ECHO MEAS - ASC AORTA: 3.6 CM
BH CV ECHO MEAS - AVA(I,A): 2.2 CM^2
BH CV ECHO MEAS - AVA(I,D): 2.2 CM^2
BH CV ECHO MEAS - AVA(V,A): 2.4 CM^2
BH CV ECHO MEAS - AVA(V,D): 2.4 CM^2
BH CV ECHO MEAS - BSA(HAYCOCK): 1.6 M^2
BH CV ECHO MEAS - BSA: 1.6 M^2
BH CV ECHO MEAS - BZI_BMI: 24.7 KILOGRAMS/M^2
BH CV ECHO MEAS - BZI_METRIC_HEIGHT: 157.5 CM
BH CV ECHO MEAS - BZI_METRIC_WEIGHT: 61.2 KG
BH CV ECHO MEAS - EDV(CUBED): 50.7 ML
BH CV ECHO MEAS - EDV(MOD-SP2): 53 ML
BH CV ECHO MEAS - EDV(MOD-SP4): 62 ML
BH CV ECHO MEAS - EDV(TEICH): 58.1 ML
BH CV ECHO MEAS - EF(CUBED): 72.7 %
BH CV ECHO MEAS - EF(MOD-BP): 60.9 %
BH CV ECHO MEAS - EF(MOD-SP2): 60.4 %
BH CV ECHO MEAS - EF(MOD-SP4): 62.9 %
BH CV ECHO MEAS - EF(TEICH): 65.3 %
BH CV ECHO MEAS - ESV(CUBED): 13.8 ML
BH CV ECHO MEAS - ESV(MOD-SP2): 21 ML
BH CV ECHO MEAS - ESV(MOD-SP4): 23 ML
BH CV ECHO MEAS - ESV(TEICH): 20.2 ML
BH CV ECHO MEAS - FS: 35.1 %
BH CV ECHO MEAS - IVS/LVPW: 1.1
BH CV ECHO MEAS - IVSD: 0.9 CM
BH CV ECHO MEAS - LAT PEAK E' VEL: 7.4 CM/SEC
BH CV ECHO MEAS - LV DIASTOLIC VOL/BSA (35-75): 38.3 ML/M^2
BH CV ECHO MEAS - LV MASS(C)D: 89.5 GRAMS
BH CV ECHO MEAS - LV MASS(C)DI: 55.3 GRAMS/M^2
BH CV ECHO MEAS - LV MAX PG: 7 MMHG
BH CV ECHO MEAS - LV MEAN PG: 3 MMHG
BH CV ECHO MEAS - LV SYSTOLIC VOL/BSA (12-30): 14.2 ML/M^2
BH CV ECHO MEAS - LV V1 MAX: 132 CM/SEC
BH CV ECHO MEAS - LV V1 MEAN: 73.7 CM/SEC
BH CV ECHO MEAS - LV V1 VTI: 22.4 CM
BH CV ECHO MEAS - LVIDD: 3.7 CM
BH CV ECHO MEAS - LVIDS: 2.4 CM
BH CV ECHO MEAS - LVLD AP2: 6.4 CM
BH CV ECHO MEAS - LVLD AP4: 6.3 CM
BH CV ECHO MEAS - LVLS AP2: 5.1 CM
BH CV ECHO MEAS - LVLS AP4: 5.4 CM
BH CV ECHO MEAS - LVOT AREA (M): 2.8 CM^2
BH CV ECHO MEAS - LVOT AREA: 2.8 CM^2
BH CV ECHO MEAS - LVOT DIAM: 1.9 CM
BH CV ECHO MEAS - LVPWD: 0.8 CM
BH CV ECHO MEAS - MED PEAK E' VEL: 6.3 CM/SEC
BH CV ECHO MEAS - MV A DUR: 0.13 SEC
BH CV ECHO MEAS - MV A MAX VEL: 78 CM/SEC
BH CV ECHO MEAS - MV DEC SLOPE: 325 CM/SEC^2
BH CV ECHO MEAS - MV DEC TIME: 272 SEC
BH CV ECHO MEAS - MV E MAX VEL: 81 CM/SEC
BH CV ECHO MEAS - MV E/A: 1
BH CV ECHO MEAS - MV MAX PG: 3.5 MMHG
BH CV ECHO MEAS - MV MEAN PG: 1 MMHG
BH CV ECHO MEAS - MV P1/2T MAX VEL: 87.7 CM/SEC
BH CV ECHO MEAS - MV P1/2T: 79 MSEC
BH CV ECHO MEAS - MV V2 MAX: 93.1 CM/SEC
BH CV ECHO MEAS - MV V2 MEAN: 52.1 CM/SEC
BH CV ECHO MEAS - MV V2 VTI: 23.2 CM
BH CV ECHO MEAS - MVA P1/2T LCG: 2.5 CM^2
BH CV ECHO MEAS - MVA(P1/2T): 2.8 CM^2
BH CV ECHO MEAS - MVA(VTI): 2.7 CM^2
BH CV ECHO MEAS - PA ACC TIME: 0.1 SEC
BH CV ECHO MEAS - PA MAX PG (FULL): 1.6 MMHG
BH CV ECHO MEAS - PA MAX PG: 3.1 MMHG
BH CV ECHO MEAS - PA PR(ACCEL): 34 MMHG
BH CV ECHO MEAS - PA V2 MAX: 87.7 CM/SEC
BH CV ECHO MEAS - PULM A REVS DUR: 0.1 SEC
BH CV ECHO MEAS - PULM A REVS VEL: 23.1 CM/SEC
BH CV ECHO MEAS - PULM DIAS VEL: 43.7 CM/SEC
BH CV ECHO MEAS - PULM S/D: 1.5
BH CV ECHO MEAS - PULM SYS VEL: 64.3 CM/SEC
BH CV ECHO MEAS - PVA(V,A): 1.8 CM^2
BH CV ECHO MEAS - PVA(V,D): 1.8 CM^2
BH CV ECHO MEAS - QP/QS: 0.49
BH CV ECHO MEAS - RAP SYSTOLE: 3 MMHG
BH CV ECHO MEAS - RV MAX PG: 1.5 MMHG
BH CV ECHO MEAS - RV MEAN PG: 1 MMHG
BH CV ECHO MEAS - RV V1 MAX: 61.4 CM/SEC
BH CV ECHO MEAS - RV V1 MEAN: 42 CM/SEC
BH CV ECHO MEAS - RV V1 VTI: 12.3 CM
BH CV ECHO MEAS - RVOT AREA: 2.5 CM^2
BH CV ECHO MEAS - RVOT DIAM: 1.8 CM
BH CV ECHO MEAS - RVSP: 28.8 MMHG
BH CV ECHO MEAS - SI(AO): 173.1 ML/M^2
BH CV ECHO MEAS - SI(CUBED): 22.8 ML/M^2
BH CV ECHO MEAS - SI(LVOT): 39.3 ML/M^2
BH CV ECHO MEAS - SI(MOD-SP2): 19.8 ML/M^2
BH CV ECHO MEAS - SI(MOD-SP4): 24.1 ML/M^2
BH CV ECHO MEAS - SI(TEICH): 23.5 ML/M^2
BH CV ECHO MEAS - SV(AO): 280 ML
BH CV ECHO MEAS - SV(CUBED): 36.8 ML
BH CV ECHO MEAS - SV(LVOT): 63.5 ML
BH CV ECHO MEAS - SV(MOD-SP2): 32 ML
BH CV ECHO MEAS - SV(MOD-SP4): 39 ML
BH CV ECHO MEAS - SV(RVOT): 31.3 ML
BH CV ECHO MEAS - SV(TEICH): 38 ML
BH CV ECHO MEAS - TAPSE (>1.6): 2 CM
BH CV ECHO MEAS - TR MAX VEL: 254 CM/SEC
BH CV ECHO MEASUREMENTS AVERAGE E/E' RATIO: 11.82
BH CV VAS BP RIGHT ARM: NORMAL MMHG
BH CV XLRA - RV BASE: 2.9 CM
BH CV XLRA - RV LENGTH: 4.6 CM
BH CV XLRA - RV MID: 2.3 CM
BH CV XLRA - TDI S': 21.1 CM/SEC
LEFT ATRIUM VOLUME INDEX: 25 ML/M2
LV EF 2D ECHO EST: 60 %
MAXIMAL PREDICTED HEART RATE: 138 BPM
SINUS: 3.2 CM
STJ: 2.9 CM
STRESS TARGET HR: 117 BPM

## 2021-01-25 PROCEDURE — 93306 TTE W/DOPPLER COMPLETE: CPT

## 2021-01-25 PROCEDURE — 93306 TTE W/DOPPLER COMPLETE: CPT | Performed by: INTERNAL MEDICINE

## 2021-02-03 DIAGNOSIS — R05.9 COUGH: ICD-10-CM

## 2021-02-03 DIAGNOSIS — A31.0 MAI (MYCOBACTERIUM AVIUM-INTRACELLULARE) (HCC): ICD-10-CM

## 2021-02-03 DIAGNOSIS — J47.9 BRONCHIECTASIS WITHOUT COMPLICATION (HCC): ICD-10-CM

## 2021-02-03 RX ORDER — BUDESONIDE AND FORMOTEROL FUMARATE DIHYDRATE 80; 4.5 UG/1; UG/1
2 AEROSOL RESPIRATORY (INHALATION) DAILY
Qty: 10.2 G | Refills: 0 | Status: SHIPPED | OUTPATIENT
Start: 2021-02-03 | End: 2021-04-19 | Stop reason: SDUPTHER

## 2021-02-24 ENCOUNTER — TRANSCRIBE ORDERS (OUTPATIENT)
Dept: CARDIOLOGY | Facility: HOSPITAL | Age: 81
End: 2021-02-24

## 2021-02-24 ENCOUNTER — HOSPITAL ENCOUNTER (OUTPATIENT)
Dept: CARDIOLOGY | Facility: HOSPITAL | Age: 81
Discharge: HOME | End: 2021-02-24
Admitting: INTERNAL MEDICINE

## 2021-02-24 DIAGNOSIS — Z01.811 PRE-OP CHEST EXAM: Primary | ICD-10-CM

## 2021-02-24 LAB — QT INTERVAL: 356 MS

## 2021-02-24 PROCEDURE — 93005 ELECTROCARDIOGRAM TRACING: CPT

## 2021-02-24 PROCEDURE — 93010 ELECTROCARDIOGRAM REPORT: CPT | Performed by: INTERNAL MEDICINE

## 2021-03-03 ENCOUNTER — LAB (OUTPATIENT)
Dept: LAB | Facility: HOSPITAL | Age: 81
End: 2021-03-03

## 2021-03-03 ENCOUNTER — TRANSCRIBE ORDERS (OUTPATIENT)
Dept: ADMINISTRATIVE | Facility: HOSPITAL | Age: 81
End: 2021-03-03

## 2021-03-03 DIAGNOSIS — Z51.81 MEDICATION MONITORING ENCOUNTER: ICD-10-CM

## 2021-03-03 DIAGNOSIS — Z51.81 MEDICATION MONITORING ENCOUNTER: Primary | ICD-10-CM

## 2021-03-03 LAB
ALBUMIN SERPL-MCNC: 4.4 G/DL (ref 3.5–5.2)
ALBUMIN/GLOB SERPL: 1.6 G/DL
ALP SERPL-CCNC: 51 U/L (ref 39–117)
ALT SERPL W P-5'-P-CCNC: 19 U/L (ref 1–33)
ANION GAP SERPL CALCULATED.3IONS-SCNC: 9.6 MMOL/L (ref 5–15)
AST SERPL-CCNC: 24 U/L (ref 1–32)
BASOPHILS # BLD AUTO: 0.05 10*3/MM3 (ref 0–0.2)
BASOPHILS NFR BLD AUTO: 0.9 % (ref 0–1.5)
BILIRUB SERPL-MCNC: 0.4 MG/DL (ref 0–1.2)
BUN SERPL-MCNC: 10 MG/DL (ref 8–23)
BUN/CREAT SERPL: 14.1 (ref 7–25)
CALCIUM SPEC-SCNC: 9.2 MG/DL (ref 8.6–10.5)
CHLORIDE SERPL-SCNC: 100 MMOL/L (ref 98–107)
CO2 SERPL-SCNC: 27.4 MMOL/L (ref 22–29)
CREAT SERPL-MCNC: 0.71 MG/DL (ref 0.57–1)
DEPRECATED RDW RBC AUTO: 40.1 FL (ref 37–54)
EOSINOPHIL # BLD AUTO: 0.47 10*3/MM3 (ref 0–0.4)
EOSINOPHIL NFR BLD AUTO: 8.7 % (ref 0.3–6.2)
ERYTHROCYTE [DISTWIDTH] IN BLOOD BY AUTOMATED COUNT: 12.3 % (ref 12.3–15.4)
GFR SERPL CREATININE-BSD FRML MDRD: 79 ML/MIN/1.73
GLOBULIN UR ELPH-MCNC: 2.8 GM/DL
GLUCOSE SERPL-MCNC: 102 MG/DL (ref 65–99)
HCT VFR BLD AUTO: 40.1 % (ref 34–46.6)
HGB BLD-MCNC: 13.3 G/DL (ref 12–15.9)
IMM GRANULOCYTES # BLD AUTO: 0.01 10*3/MM3 (ref 0–0.05)
IMM GRANULOCYTES NFR BLD AUTO: 0.2 % (ref 0–0.5)
LYMPHOCYTES # BLD AUTO: 1.74 10*3/MM3 (ref 0.7–3.1)
LYMPHOCYTES NFR BLD AUTO: 32 % (ref 19.6–45.3)
MCH RBC QN AUTO: 29.8 PG (ref 26.6–33)
MCHC RBC AUTO-ENTMCNC: 33.2 G/DL (ref 31.5–35.7)
MCV RBC AUTO: 89.9 FL (ref 79–97)
MONOCYTES # BLD AUTO: 0.37 10*3/MM3 (ref 0.1–0.9)
MONOCYTES NFR BLD AUTO: 6.8 % (ref 5–12)
NEUTROPHILS NFR BLD AUTO: 2.79 10*3/MM3 (ref 1.7–7)
NEUTROPHILS NFR BLD AUTO: 51.4 % (ref 42.7–76)
NRBC BLD AUTO-RTO: 0 /100 WBC (ref 0–0.2)
PLATELET # BLD AUTO: 277 10*3/MM3 (ref 140–450)
PMV BLD AUTO: 9.2 FL (ref 6–12)
POTASSIUM SERPL-SCNC: 3.9 MMOL/L (ref 3.5–5.2)
PROT SERPL-MCNC: 7.2 G/DL (ref 6–8.5)
RBC # BLD AUTO: 4.46 10*6/MM3 (ref 3.77–5.28)
SODIUM SERPL-SCNC: 137 MMOL/L (ref 136–145)
WBC # BLD AUTO: 5.43 10*3/MM3 (ref 3.4–10.8)

## 2021-03-03 PROCEDURE — 80053 COMPREHEN METABOLIC PANEL: CPT

## 2021-03-03 PROCEDURE — 85025 COMPLETE CBC W/AUTO DIFF WBC: CPT

## 2021-03-03 PROCEDURE — 36415 COLL VENOUS BLD VENIPUNCTURE: CPT

## 2021-03-09 DIAGNOSIS — Z23 IMMUNIZATION DUE: ICD-10-CM

## 2021-03-18 ENCOUNTER — LAB (OUTPATIENT)
Dept: LAB | Facility: HOSPITAL | Age: 81
End: 2021-03-18

## 2021-03-18 ENCOUNTER — TRANSCRIBE ORDERS (OUTPATIENT)
Dept: ADMINISTRATIVE | Facility: HOSPITAL | Age: 81
End: 2021-03-18

## 2021-03-18 DIAGNOSIS — Z22.9: ICD-10-CM

## 2021-03-18 DIAGNOSIS — J47.9 BRONCHIECTASIS WITHOUT ACUTE EXACERBATION (HCC): Primary | ICD-10-CM

## 2021-03-18 DIAGNOSIS — J47.9 BRONCHIECTASIS WITHOUT ACUTE EXACERBATION (HCC): ICD-10-CM

## 2021-03-18 DIAGNOSIS — Z86.19 PERSONAL HISTORY OF UNSPECIFIED INFECTIOUS AND PARASITIC DISEASE: ICD-10-CM

## 2021-03-18 LAB — GIE STN SPEC: NORMAL

## 2021-03-18 PROCEDURE — 82785 ASSAY OF IGE: CPT

## 2021-03-18 PROCEDURE — 87205 SMEAR GRAM STAIN: CPT

## 2021-03-18 PROCEDURE — 87206 SMEAR FLUORESCENT/ACID STAI: CPT

## 2021-03-18 PROCEDURE — 87070 CULTURE OTHR SPECIMN AEROBIC: CPT

## 2021-03-18 PROCEDURE — 36415 COLL VENOUS BLD VENIPUNCTURE: CPT

## 2021-03-18 PROCEDURE — 87102 FUNGUS ISOLATION CULTURE: CPT

## 2021-03-18 PROCEDURE — 86606 ASPERGILLUS ANTIBODY: CPT

## 2021-03-20 LAB
BACTERIA SPEC RESP CULT: NORMAL
GRAM STN SPEC: NORMAL
GRAM STN SPEC: NORMAL
IGE SERPL-ACNC: 47 IU/ML (ref 6–495)

## 2021-03-21 LAB
A FLAVUS AB SER QL ID: NEGATIVE
A FUMIGATUS AB SER QL ID: NEGATIVE
A NIGER AB SER QL ID: NEGATIVE

## 2021-04-15 LAB — FUNGUS WND CULT: NORMAL

## 2021-04-19 ENCOUNTER — OFFICE VISIT (OUTPATIENT)
Dept: FAMILY MEDICINE CLINIC | Facility: CLINIC | Age: 81
End: 2021-04-19

## 2021-04-19 VITALS
HEART RATE: 78 BPM | BODY MASS INDEX: 24.66 KG/M2 | SYSTOLIC BLOOD PRESSURE: 116 MMHG | TEMPERATURE: 98.7 F | OXYGEN SATURATION: 98 % | DIASTOLIC BLOOD PRESSURE: 70 MMHG | HEIGHT: 62 IN | WEIGHT: 134 LBS

## 2021-04-19 DIAGNOSIS — Z13.29 SCREENING FOR THYROID DISORDER: ICD-10-CM

## 2021-04-19 DIAGNOSIS — E78.00 PURE HYPERCHOLESTEROLEMIA: Primary | ICD-10-CM

## 2021-04-19 DIAGNOSIS — A31.0 MAI (MYCOBACTERIUM AVIUM-INTRACELLULARE) (HCC): ICD-10-CM

## 2021-04-19 DIAGNOSIS — R73.9 HYPERGLYCEMIA: ICD-10-CM

## 2021-04-19 DIAGNOSIS — E55.9 HYPOVITAMINOSIS D: ICD-10-CM

## 2021-04-19 DIAGNOSIS — J47.9 BRONCHIECTASIS WITHOUT COMPLICATION (HCC): ICD-10-CM

## 2021-04-19 DIAGNOSIS — E87.1 ACUTE HYPONATREMIA: ICD-10-CM

## 2021-04-19 DIAGNOSIS — R05.9 COUGH: ICD-10-CM

## 2021-04-19 PROCEDURE — 99214 OFFICE O/P EST MOD 30 MIN: CPT | Performed by: FAMILY MEDICINE

## 2021-04-19 RX ORDER — BUDESONIDE AND FORMOTEROL FUMARATE DIHYDRATE 80; 4.5 UG/1; UG/1
2 AEROSOL RESPIRATORY (INHALATION) DAILY
Qty: 10.2 G | Refills: 6 | Status: SHIPPED | OUTPATIENT
Start: 2021-04-19 | End: 2021-10-19

## 2021-04-19 NOTE — PROGRESS NOTES
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  Not been seen due to COVID-19 pandemic. Still being managed by ID at [facility] - on 3 antibiotics weekly for MARIAMA.  Has had more weakness, coughing, wheezing and SOA.  Had recent sputums and had eosinophils and seems to have responded well; She has been on allergy shots in the past.       Objective   Vital Signs:   /70   Pulse 78   Temp 98.7 °F (37.1 °C)   Ht 157.5 cm (62\")   Wt 60.8 kg (134 lb)   SpO2 98%   BMI 24.51 kg/m²     Physical Exam  Vitals and nursing note reviewed.   Constitutional:       General: She is not in acute distress.     Appearance: She is well-developed.   HENT:      Head: Normocephalic.      Nose: Nose normal.   Cardiovascular:      Rate and Rhythm: Normal rate and regular rhythm.      Heart sounds: Normal heart sounds. No murmur heard.     Pulmonary:      Effort: Pulmonary effort is normal. No respiratory distress.      Breath sounds: Normal breath sounds.   Musculoskeletal:         General: Normal range of motion.   Skin:     General: Skin is warm and dry.      Findings: No rash.   Neurological:      Mental Status: She is alert and oriented to person, place, and time.   Psychiatric:         Behavior: Behavior normal.         Thought Content: Thought content normal.         Judgment: Judgment normal.        Result Review :{Labs  Result Review  Imaging  Med Tab  Media :23}                 Assessment and Plan    Diagnoses and all orders for this visit:    1. Pure hypercholesterolemia (Primary)  -     Lipid Panel    2. Hyperglycemia  -     Hemoglobin A1c    3. Acute hyponatremia    4. MARIAMA " (mycobacterium avium-intracellulare) (CMS/[facility])  -     budesonide-formoterol (Symbicort) 80-4.5 MCG/ACT inhaler; Inhale 2 puffs Daily.  Dispense: 10.2 g; Refill: 6    5. Hypovitaminosis D  -     Vitamin D 25 Hydroxy    6. Screening for thyroid disorder  -     TSH  -     T4, Free    7. Bronchiectasis without complication (CMS/[facility])  -     budesonide-formoterol (Symbicort) 80-4.5 MCG/ACT inhaler; Inhale 2 puffs Daily.  Dispense: 10.2 g; Refill: 6    8. Cough  -     budesonide-formoterol (Symbicort) 80-4.5 MCG/ACT inhaler; Inhale 2 puffs Daily.  Dispense: 10.2 g; Refill: 6    Other orders  -     Cancel: Comprehensive Metabolic Panel  -     Cancel: CBC & Differential    Over all stable, greatest concern is MARIAMA followeed regularly by the [facility].  She seems to be is responding well to treatment, does need to continue with Symbicort.    Labs as above to follow-up hyperglycemia and hyperlipidemia.  She is very motivated to make lifestyle changes and would like the least amount of medications as possible.  Her greatest concern are her pulmonary issues and would like to minimize any additional prescriptions.      Follow Up   Return in about 6 months (around 10/19/2021), or if symptoms worsen or fail to improve, for Medicare Wellness with fasting labs prior.  Patient was given instructions and counseling regarding her condition or for health maintenance advice. Please see specific information pulled into the AVS if appropriate. Medical assistant and I wore mask and eyewear protection during entire encounter.  Patient wore mask.    Su Castaneda MD

## 2021-04-30 LAB
25(OH)D3+25(OH)D2 SERPL-MCNC: 43.6 NG/ML (ref 30–100)
CHOLEST SERPL-MCNC: 282 MG/DL (ref 0–200)
HBA1C MFR BLD: 5.9 % (ref 4.8–5.6)
HDLC SERPL-MCNC: 91 MG/DL (ref 40–60)
LDLC SERPL CALC-MCNC: 176 MG/DL (ref 0–100)
T4 FREE SERPL-MCNC: 0.9 NG/DL (ref 0.93–1.7)
TRIGL SERPL-MCNC: 89 MG/DL (ref 0–150)
TSH SERPL DL<=0.005 MIU/L-ACNC: 3.56 UIU/ML (ref 0.27–4.2)
VLDLC SERPL CALC-MCNC: 15 MG/DL (ref 5–40)

## 2021-05-11 ENCOUNTER — LAB (OUTPATIENT)
Dept: LAB | Facility: HOSPITAL | Age: 81
End: 2021-05-11

## 2021-05-11 ENCOUNTER — TRANSCRIBE ORDERS (OUTPATIENT)
Dept: ADMINISTRATIVE | Facility: HOSPITAL | Age: 81
End: 2021-05-11

## 2021-05-11 DIAGNOSIS — A31.0 MAI (MYCOBACTERIUM AVIUM-INTRACELLULARE) (HCC): ICD-10-CM

## 2021-05-11 DIAGNOSIS — J47.9 BRONCHIECTASIS WITHOUT COMPLICATION (HCC): Primary | ICD-10-CM

## 2021-05-11 DIAGNOSIS — J47.9 BRONCHIECTASIS WITHOUT COMPLICATION (HCC): ICD-10-CM

## 2021-05-11 PROCEDURE — 87186 SC STD MICRODIL/AGAR DIL: CPT

## 2021-05-11 PROCEDURE — 87149 DNA/RNA DIRECT PROBE: CPT

## 2021-05-11 PROCEDURE — 36415 COLL VENOUS BLD VENIPUNCTURE: CPT

## 2021-05-11 PROCEDURE — 87206 SMEAR FLUORESCENT/ACID STAI: CPT

## 2021-05-11 PROCEDURE — 87116 MYCOBACTERIA CULTURE: CPT

## 2021-05-11 PROCEDURE — 85027 COMPLETE CBC AUTOMATED: CPT

## 2021-05-11 PROCEDURE — 80053 COMPREHEN METABOLIC PANEL: CPT

## 2021-05-11 PROCEDURE — 87158 CULTURE TYPING ADDED METHOD: CPT

## 2021-05-18 ENCOUNTER — APPOINTMENT (OUTPATIENT)
Dept: WOMENS IMAGING | Facility: HOSPITAL | Age: 81
End: 2021-05-18

## 2021-05-18 PROCEDURE — 77063 BREAST TOMOSYNTHESIS BI: CPT | Performed by: RADIOLOGY

## 2021-05-18 PROCEDURE — 77067 SCR MAMMO BI INCL CAD: CPT | Performed by: RADIOLOGY

## 2021-06-04 LAB
Lab: ABNORMAL
MYCOBACTERIUM SPEC CULT: ABNORMAL
NIGHT BLUE STAIN TISS: ABNORMAL

## 2021-10-07 DIAGNOSIS — E55.9 HYPOVITAMINOSIS D: ICD-10-CM

## 2021-10-07 DIAGNOSIS — E87.1 ACUTE HYPONATREMIA: ICD-10-CM

## 2021-10-07 DIAGNOSIS — R73.9 HYPERGLYCEMIA: Primary | ICD-10-CM

## 2021-10-07 DIAGNOSIS — E78.00 PURE HYPERCHOLESTEROLEMIA: ICD-10-CM

## 2021-10-07 DIAGNOSIS — Z13.29 SCREENING FOR THYROID DISORDER: ICD-10-CM

## 2021-10-13 ENCOUNTER — LAB (OUTPATIENT)
Dept: LAB | Facility: HOSPITAL | Age: 81
End: 2021-10-13

## 2021-10-13 ENCOUNTER — TRANSCRIBE ORDERS (OUTPATIENT)
Dept: ADMINISTRATIVE | Facility: HOSPITAL | Age: 81
End: 2021-10-13

## 2021-10-13 DIAGNOSIS — R05.9 COUGH: ICD-10-CM

## 2021-10-13 DIAGNOSIS — R06.00 DYSPNEA, UNSPECIFIED TYPE: Primary | ICD-10-CM

## 2021-10-13 DIAGNOSIS — R05.3 CHRONIC COUGH: ICD-10-CM

## 2021-10-13 PROCEDURE — 87116 MYCOBACTERIA CULTURE: CPT | Performed by: INTERNAL MEDICINE

## 2021-10-13 PROCEDURE — 87206 SMEAR FLUORESCENT/ACID STAI: CPT | Performed by: INTERNAL MEDICINE

## 2021-10-13 PROCEDURE — 87070 CULTURE OTHR SPECIMN AEROBIC: CPT | Performed by: ORTHOPAEDIC SURGERY

## 2021-10-15 LAB
25(OH)D3+25(OH)D2 SERPL-MCNC: 36 NG/ML (ref 30–100)
ALBUMIN SERPL-MCNC: 4.4 G/DL (ref 3.5–5.2)
ALBUMIN/GLOB SERPL: 2 G/DL
ALP SERPL-CCNC: 57 U/L (ref 39–117)
ALT SERPL-CCNC: 13 U/L (ref 1–33)
AST SERPL-CCNC: 27 U/L (ref 1–32)
BACTERIA SPEC RESP CULT: NORMAL
BASOPHILS # BLD AUTO: 0.06 10*3/MM3 (ref 0–0.2)
BASOPHILS NFR BLD AUTO: 1 % (ref 0–1.5)
BILIRUB SERPL-MCNC: 0.3 MG/DL (ref 0–1.2)
BUN SERPL-MCNC: 12 MG/DL (ref 8–23)
BUN/CREAT SERPL: 16.4 (ref 7–25)
CALCIUM SERPL-MCNC: 9.5 MG/DL (ref 8.6–10.5)
CHLORIDE SERPL-SCNC: 100 MMOL/L (ref 98–107)
CHOLEST SERPL-MCNC: 268 MG/DL (ref 0–200)
CO2 SERPL-SCNC: 26.4 MMOL/L (ref 22–29)
CREAT SERPL-MCNC: 0.73 MG/DL (ref 0.57–1)
EOSINOPHIL # BLD AUTO: 0.53 10*3/MM3 (ref 0–0.4)
EOSINOPHIL NFR BLD AUTO: 8.7 % (ref 0.3–6.2)
ERYTHROCYTE [DISTWIDTH] IN BLOOD BY AUTOMATED COUNT: 12.4 % (ref 12.3–15.4)
FT4I SERPL CALC-MCNC: 1.2 (ref 1.2–4.9)
GLOBULIN SER CALC-MCNC: 2.2 GM/DL
GLUCOSE SERPL-MCNC: 90 MG/DL (ref 65–99)
GRAM STN SPEC: NORMAL
GRAM STN SPEC: NORMAL
HBA1C MFR BLD: 6 % (ref 4.8–5.6)
HCT VFR BLD AUTO: 38.2 % (ref 34–46.6)
HDLC SERPL-MCNC: 87 MG/DL (ref 40–60)
HGB BLD-MCNC: 12.6 G/DL (ref 12–15.9)
IMM GRANULOCYTES # BLD AUTO: 0.02 10*3/MM3 (ref 0–0.05)
IMM GRANULOCYTES NFR BLD AUTO: 0.3 % (ref 0–0.5)
LDLC SERPL CALC-MCNC: 167 MG/DL (ref 0–100)
LDLC/HDLC SERPL: 1.89 {RATIO}
LYMPHOCYTES # BLD AUTO: 1.3 10*3/MM3 (ref 0.7–3.1)
LYMPHOCYTES NFR BLD AUTO: 21.4 % (ref 19.6–45.3)
MCH RBC QN AUTO: 29.9 PG (ref 26.6–33)
MCHC RBC AUTO-ENTMCNC: 33 G/DL (ref 31.5–35.7)
MCV RBC AUTO: 90.7 FL (ref 79–97)
MONOCYTES # BLD AUTO: 0.5 10*3/MM3 (ref 0.1–0.9)
MONOCYTES NFR BLD AUTO: 8.2 % (ref 5–12)
NEUTROPHILS # BLD AUTO: 3.66 10*3/MM3 (ref 1.7–7)
NEUTROPHILS NFR BLD AUTO: 60.4 % (ref 42.7–76)
NRBC BLD AUTO-RTO: 0 /100 WBC (ref 0–0.2)
PLATELET # BLD AUTO: 284 10*3/MM3 (ref 140–450)
POTASSIUM SERPL-SCNC: 4.9 MMOL/L (ref 3.5–5.2)
PROT SERPL-MCNC: 6.6 G/DL (ref 6–8.5)
RBC # BLD AUTO: 4.21 10*6/MM3 (ref 3.77–5.28)
SODIUM SERPL-SCNC: 135 MMOL/L (ref 136–145)
T3RU NFR SERPL: 19 % (ref 24–39)
T4 SERPL-MCNC: 6.5 UG/DL (ref 4.5–12)
TRIGL SERPL-MCNC: 85 MG/DL (ref 0–150)
TSH SERPL DL<=0.005 MIU/L-ACNC: 3.61 UIU/ML (ref 0.45–4.5)
VLDLC SERPL CALC-MCNC: 14 MG/DL (ref 5–40)
WBC # BLD AUTO: 6.07 10*3/MM3 (ref 3.4–10.8)

## 2021-10-19 DIAGNOSIS — A31.0 MAI (MYCOBACTERIUM AVIUM-INTRACELLULARE) (HCC): ICD-10-CM

## 2021-10-19 DIAGNOSIS — R05.9 COUGH: ICD-10-CM

## 2021-10-19 DIAGNOSIS — J47.9 BRONCHIECTASIS WITHOUT COMPLICATION (HCC): ICD-10-CM

## 2021-10-19 RX ORDER — BUDESONIDE AND FORMOTEROL FUMARATE DIHYDRATE 80; 4.5 UG/1; UG/1
AEROSOL RESPIRATORY (INHALATION)
Qty: 10.2 G | Refills: 6 | Status: SHIPPED | OUTPATIENT
Start: 2021-10-19 | End: 2021-10-20

## 2021-10-19 NOTE — TELEPHONE ENCOUNTER
Rx Refill Note  Requested Prescriptions     Pending Prescriptions Disp Refills   • budesonide-formoterol (SYMBICORT) 80-4.5 MCG/ACT inhaler [Pharmacy Med Name: BUDESONIDE/FORM 80/4.5MCG (120 INH)] 10.2 g 6     Sig: INHALE 2 PUFFS BY MOUTH DAILY      Last office visit with prescribing clinician: 4/19/2021      Next office visit with prescribing clinician: 10/22/2021            Amy Machuca MA  10/19/21, 13:37 EDT

## 2021-10-20 RX ORDER — BUDESONIDE AND FORMOTEROL FUMARATE DIHYDRATE 80; 4.5 UG/1; UG/1
AEROSOL RESPIRATORY (INHALATION)
Qty: 10.2 G | Refills: 6 | Status: SHIPPED | OUTPATIENT
Start: 2021-10-20 | End: 2022-10-14

## 2021-10-22 ENCOUNTER — OFFICE VISIT (OUTPATIENT)
Dept: FAMILY MEDICINE CLINIC | Facility: CLINIC | Age: 81
End: 2021-10-22

## 2021-10-22 ENCOUNTER — TELEPHONE (OUTPATIENT)
Dept: FAMILY MEDICINE CLINIC | Facility: CLINIC | Age: 81
End: 2021-10-22

## 2021-10-22 VITALS
TEMPERATURE: 97.8 F | OXYGEN SATURATION: 99 % | WEIGHT: 129 LBS | SYSTOLIC BLOOD PRESSURE: 130 MMHG | HEART RATE: 72 BPM | BODY MASS INDEX: 23.74 KG/M2 | DIASTOLIC BLOOD PRESSURE: 82 MMHG | HEIGHT: 62 IN

## 2021-10-22 DIAGNOSIS — M25.562 CHRONIC PAIN OF LEFT KNEE: ICD-10-CM

## 2021-10-22 DIAGNOSIS — J40 EOSINOPHILIC BRONCHITIS: ICD-10-CM

## 2021-10-22 DIAGNOSIS — D72.18 EOSINOPHILIC BRONCHITIS: ICD-10-CM

## 2021-10-22 DIAGNOSIS — Z00.00 MEDICARE ANNUAL WELLNESS VISIT, SUBSEQUENT: Primary | ICD-10-CM

## 2021-10-22 DIAGNOSIS — A31.0 MAI (MYCOBACTERIUM AVIUM-INTRACELLULARE) (HCC): ICD-10-CM

## 2021-10-22 DIAGNOSIS — G89.29 CHRONIC PAIN OF LEFT KNEE: ICD-10-CM

## 2021-10-22 DIAGNOSIS — R73.9 HYPERGLYCEMIA: ICD-10-CM

## 2021-10-22 DIAGNOSIS — J47.1 BRONCHIECTASIS WITH (ACUTE) EXACERBATION (HCC): ICD-10-CM

## 2021-10-22 PROCEDURE — 1170F FXNL STATUS ASSESSED: CPT | Performed by: FAMILY MEDICINE

## 2021-10-22 PROCEDURE — 1160F RVW MEDS BY RX/DR IN RCRD: CPT | Performed by: FAMILY MEDICINE

## 2021-10-22 PROCEDURE — 1159F MED LIST DOCD IN RCRD: CPT | Performed by: FAMILY MEDICINE

## 2021-10-22 PROCEDURE — 1126F AMNT PAIN NOTED NONE PRSNT: CPT | Performed by: FAMILY MEDICINE

## 2021-10-22 PROCEDURE — G0439 PPPS, SUBSEQ VISIT: HCPCS | Performed by: FAMILY MEDICINE

## 2021-10-22 RX ORDER — AMIKACIN 590 MG/8.4ML
590 SUSPENSION RESPIRATORY (INHALATION) DAILY
COMMUNITY
Start: 2021-08-13 | End: 2022-01-21

## 2021-10-22 RX ORDER — ETHAMBUTOL HYDROCHLORIDE 400 MG/1
600 TABLET, FILM COATED ORAL DAILY
COMMUNITY
Start: 2021-08-13

## 2021-10-22 RX ORDER — RIFAMPIN 300 MG/1
CAPSULE ORAL
COMMUNITY
Start: 2021-08-22

## 2021-10-22 RX ORDER — PREDNISONE 20 MG/1
TABLET ORAL
COMMUNITY
Start: 2021-08-27 | End: 2022-01-21

## 2021-10-22 RX ORDER — AZITHROMYCIN 250 MG/1
250 TABLET, FILM COATED ORAL DAILY
COMMUNITY
Start: 2021-08-13

## 2021-10-22 NOTE — PROGRESS NOTES
The ABCs of the Annual Wellness Visit  Subsequent Medicare Wellness Visit    Chief Complaint   Patient presents with   • Medicare Wellness-subsequent     no complains       Subjective    History of Present Illness:  Jenniffer Dumont is a 81 y.o. female who presents for a Subsequent Medicare Wellness Visit.    Having acute bronchiectasis for MARIAMA - on prednisone and doxy, Amikacin, Rifampin, ethambutol, azithromycin and chest vibration to help clear congestion.  She does feel somewhat better with the current prednisone and doxycycline.  She will be completing this course in a couple of days.    also hurt ad know eileen L knee is worst with her L knee, seeing her orthopedic dr in [place].  Had a PRP shot.  Then had a cortisone shot that lasted 2 weeks and a gel shot with minimal improvement.     The following portions of the patient's history were reviewed and   updated as appropriate: allergies, current medications, past family history, past medical history, past social history, past surgical history and problem list.    Compared to one year ago, the patient feels her physical   health is worse.    Compared to one year ago, the patient feels her mental   health is worse.    Recent Hospitalizations:  She was not admitted to the hospital during the last year.       Current Medical Providers:  Patient Care Team:  Su Castaneda MD as PCP - General (Family Medicine)    Outpatient Medications Prior to Visit   Medication Sig Dispense Refill   • albuterol (PROVENTIL) (2.5 MG/3ML) 0.083% nebulizer solution Take 2.5 mg by nebulization Every 6 (Six) Hours As Needed for Wheezing or Shortness of Air. 100 vial 11   • Amikacin Sulfate Liposome (Arikayce) 590 MG/8.4ML suspension Inhale 590 mg Daily.     • Ascorbic Acid (VITAMIN C) 500 MG capsule Take  by mouth. Three times weekly     • azithromycin (ZITHROMAX) 250 MG tablet Take 250 mg by mouth Daily.     • budesonide-formoterol (SYMBICORT) 80-4.5 MCG/ACT inhaler INHALE 2 PUFFS BY MOUTH  DAILY 10.2 g 6   • Calcium Carbonate-Vitamin D (CALCIUM 500 + D PO) Take 2,000 Units by mouth Daily.     • Coenzyme Q10 (COQ-10 PO) Take by mouth.     • esomeprazole (nexIUM) 40 MG capsule Take 40 mg by mouth Every Morning Before Breakfast.     • ethambutol (MYAMBUTOL) 400 MG tablet Take 600 mg by mouth Daily.     • fexofenadine (ALLEGRA) 180 MG tablet Take by mouth daily.     • fluticasone (FLONASE) 50 MCG/ACT nasal spray into each nostril.     • MAGNESIUM PO Take  by mouth.     • Multiple Vitamins-Minerals (MULTIVITAMIN WITH MINERALS) tablet Take 1 tablet by mouth daily.     • predniSONE (DELTASONE) 20 MG tablet Take 1 tab BID for 3days, then 1 tab daily for 3 days, then 1/2 tab daily for 3days, then 1/2 tab every other day x 3 days, then stop. Hold on to leftovers.     • rifAMPin (RIFADIN) 300 MG capsule        No facility-administered medications prior to visit.       No opioid medication identified on active medication list. I have reviewed chart for other potential  high risk medication/s and harmful drug interactions in the elderly.          Aspirin is not on active medication list.  Aspirin use is not indicated based on review of current medical condition/s. Risk of harm outweighs potential benefits.  .    Patient Active Problem List   Diagnosis   • Bronchiectasis with (acute) exacerbation (HCC)   • Cough   • HLD (hyperlipidemia)   • MARIAMA (mycobacterium avium-intracellulare) (HCC)   • Allergic sinusitis   • History of melanoma   • Gastroesophageal reflux disease   • Hyperglycemia   • Pneumonia due to infectious organism   • Acute hyponatremia   • Chronic pain of left knee     Advance Care Planning  Advance Directive is not on file.  ACP discussion was held with the patient during this visit. Patient does not have an advance directive, information provided.          Objective    Vitals:    10/22/21 1034   BP: 130/82   Pulse: 72   Temp: 97.8 °F (36.6 °C)   SpO2: 99%   Weight: 58.5 kg (129 lb)   Height: 157.5 cm  "(62\")   PainSc: 0-No pain     BMI Readings from Last 1 Encounters:   10/22/21 23.59 kg/m²   BMI is within normal parameters. No follow-up required.    Does the patient have evidence of cognitive impairment? No    Physical Exam  Vitals and nursing note reviewed.   Constitutional:       General: She is not in acute distress.     Appearance: She is well-developed.   HENT:      Head: Normocephalic.      Nose: Nose normal.   Cardiovascular:      Rate and Rhythm: Normal rate and regular rhythm.      Heart sounds: Normal heart sounds. No murmur heard.      Pulmonary:      Effort: Pulmonary effort is normal. No respiratory distress.      Comments: Fine pulmonary crackles bilaterally.  Musculoskeletal:         General: Normal range of motion.   Skin:     General: Skin is warm and dry.      Findings: No rash.   Neurological:      Mental Status: She is alert and oriented to person, place, and time.   Psychiatric:         Behavior: Behavior normal.         Thought Content: Thought content normal.         Judgment: Judgment normal.       Lab Results   Component Value Date    GLU 90 10/14/2021    GLU 87 2021    CHLPL 268 (H) 10/14/2021    TRIG 85 10/14/2021    HDL 87 (H) 10/14/2021     (H) 10/14/2021    VLDL 14 10/14/2021    HGBA1C 6.00 (H) 10/14/2021            HEALTH RISK ASSESSMENT    Smoking Status:  Social History     Tobacco Use   Smoking Status Former Smoker   • Quit date:    • Years since quittin.8   Smokeless Tobacco Never Used     Alcohol Consumption:  Social History     Substance and Sexual Activity   Alcohol Use Yes   • Alcohol/week: 2.0 standard drinks   • Types: 2 Glasses of wine per week    Comment: Social use     Fall Risk Screen:    STEADI Fall Risk Assessment was completed, and patient is at LOW risk for falls.Assessment completed on:10/22/2021    Depression Screening:  PHQ-2/PHQ-9 Depression Screening 10/22/2021   Little interest or pleasure in doing things -   Feeling down, depressed, or " hopeless 1   Total Score 1       Health Habits and Functional and Cognitive Screening:  Functional & Cognitive Status 10/22/2021   Do you have difficulty preparing food and eating? No   Do you have difficulty bathing yourself, getting dressed or grooming yourself? No   Do you have difficulty using the toilet? Yes   Do you have difficulty moving around from place to place? Yes   Do you have trouble with steps or getting out of a bed or a chair? Yes   Current Diet Well Balanced Diet   Dental Exam Up to date   Eye Exam Up to date   Exercise (times per week) 1 times per week   Current Exercises Include Walking   Current Exercise Activities Include -   Do you need help using the phone?  No   Are you deaf or do you have serious difficulty hearing?  Yes   Do you need help with transportation? Yes   Do you need help shopping? No   Do you need help preparing meals?  No   Do you need help with housework?  No   Do you need help with laundry? No   Do you need help taking your medications? No   Do you need help managing money? No   Do you ever drive or ride in a car without wearing a seat belt? No   Have you felt unusual stress, anger or loneliness in the last month? No   Who do you live with? Spouse   If you need help, do you have trouble finding someone available to you? No   Have you been bothered in the last four weeks by sexual problems? No   Do you have difficulty concentrating, remembering or making decisions? No       Age-appropriate Screening Schedule:  Refer to the list below for future screening recommendations based on patient's age, sex and/or medical conditions. Orders for these recommended tests are listed in the plan section. The patient has been provided with a written plan.    Health Maintenance   Topic Date Due   • TDAP/TD VACCINES (1 - Tdap) Never done   • ZOSTER VACCINE (1 of 2) Never done   • INFLUENZA VACCINE  08/01/2021   • DXA SCAN  10/22/2021 (Originally 10/14/2016)   • LIPID PANEL  10/14/2022   •  MAMMOGRAM  05/24/2023              Assessment/Plan   CMS Preventative Services Quick Reference  Risk Factors Identified During Encounter  Depression/Dysphoria  Inactivity/Sedentary  The above risks/problems have been discussed with the patient.  Follow up actions/plans if indicated are seen below in the Assessment/Plan Section.  Pertinent information has been shared with the patient in the After Visit Summary.    Diagnoses and all orders for this visit:    1. Medicare annual wellness visit, subsequent (Primary)    2. Eosinophilic bronchitis  -     CBC & Differential; Future    3. Bronchiectasis with (acute) exacerbation (HCC)    4. MARIAMA (mycobacterium avium-intracellulare) (HCC)    5. Chronic pain of left knee    6. Hyperglycemia  -     Comprehensive Metabolic Panel; Future  -     Hemoglobin A1c; Future    Here for annual exam, fasting labs reviewed recent labs with patient, immunizations up-to-date, colon cancer screening up-to-date, GYN health DEXA due but as her health is not great she wants to wait until the spring.  Mammogram up-to-date May 24, 2021, cardiovascular screening negative for symptoms, counseled patient to increase vegetable intake, water and 150 minutes of exercise weekly combining weightbearing exercises and aerobic activity.    Patient with chronic MARIAMA with acute bronchiectasis.  Currently being treated by her pulmonologist at [facility] - Dr. Andrew Cruz.  She will be completing prednisone and doxycycline over the next couple of days.  She recalls that he commented that if her blood count shows elevated eosinophils that he would attribute her acute bronchiectasis to a more allergic etiology than infectious.  As it happens, when her CBC has resulted, her eosinophils have been elevated.  Neutrophils and lymphocytes are normal.  I would agree that her symptoms do seem to have a strong allergic component.  We have contacted his office to get his input.  I was not sure if he was planning  to do the allergic treatment over recommending referral to an allergist.  If so I am happy to refer her to Dr. Brink or other allergist per his recommendation.    Patient with chronic left knee pain, no knee brace.  She has received PRP, hyaluronic injections and to cortisone injections with minimal improvement.  She is unable to do water therapy because of the bronchiectasis.  She normally does walk daily and is very active but is unable to do that right now.  I am hopeful that she will improve with doing home exercises possibly considering surgery depending on response to this next steroid shot.    Hyperglycemia has been worsening.  I do think a lot of this is due to inactivity and possible steroids.  Will follow again in 6 months    Follow Up:   Return in about 6 months (around 4/22/2022), or if symptoms worsen or fail to improve, for Recheck hyperglycemia with labs prior .     An After Visit Summary and PPPS were made available to the patient.               Medical assistant and I wore mask and eyewear protection during entire encounter.  Patient wore mask.    Su Castaneda MD

## 2021-10-22 NOTE — TELEPHONE ENCOUNTER
Per patient request, faxed over current lab work, as of 10/22/2021, to [facility], where she is a patient of Dr. Amado Aguiar.

## 2021-10-25 ENCOUNTER — TELEPHONE (OUTPATIENT)
Dept: FAMILY MEDICINE CLINIC | Facility: CLINIC | Age: 81
End: 2021-10-25

## 2021-10-25 NOTE — TELEPHONE ENCOUNTER
----- Message from Su Castaneda MD sent at 10/22/2021  3:43 PM EDT -----  Please call patient's pulmonologist at [facility].  Dr. Andrew Cruz.  Very pleasant patient has known MARIAMA being treated currently for acute bronchiectasis on prednisone and doxycycline.  He made a comment to the patient that if her eosinophils were elevated that he would think that her acute bronchiectasis was allergy related.  When her CBC has resulted, her eosinophils have been elevated.  Neutrophils and lymphocytes are normal.  I would agree that her symptoms do seem to have a strong allergic component.  If he is agreeable I will refer her to an allergist, he had a particular specialist he wanted her to see him happy to help coordinate.  I know he may be able to help with some of the allergy treatments as well and wanted to check with him before placing a referral to a different specialist.Please thank him for his excellent care of her mutual patient, she has great thankfulness for him.Thank you,Dr. Castaneda.

## 2021-11-03 ENCOUNTER — TELEPHONE (OUTPATIENT)
Dept: FAMILY MEDICINE CLINIC | Facility: CLINIC | Age: 81
End: 2021-11-03

## 2021-11-04 DIAGNOSIS — J40 EOSINOPHILIC BRONCHITIS: Primary | ICD-10-CM

## 2021-11-04 DIAGNOSIS — J47.1 BRONCHIECTASIS WITH (ACUTE) EXACERBATION (HCC): ICD-10-CM

## 2021-11-04 DIAGNOSIS — D72.18 EOSINOPHILIC BRONCHITIS: Primary | ICD-10-CM

## 2021-11-18 DIAGNOSIS — R73.9 HYPERGLYCEMIA: Primary | ICD-10-CM

## 2021-11-18 DIAGNOSIS — E87.1 ACUTE HYPONATREMIA: ICD-10-CM

## 2021-11-18 DIAGNOSIS — E55.9 HYPOVITAMINOSIS D: ICD-10-CM

## 2021-11-25 LAB
MYCOBACTERIUM SPEC CULT: NORMAL
NIGHT BLUE STAIN TISS: NORMAL

## 2022-01-19 ENCOUNTER — TELEPHONE (OUTPATIENT)
Dept: FAMILY MEDICINE CLINIC | Facility: CLINIC | Age: 82
End: 2022-01-19

## 2022-01-19 DIAGNOSIS — A31.0 MAI (MYCOBACTERIUM AVIUM-INTRACELLULARE): ICD-10-CM

## 2022-01-19 DIAGNOSIS — J47.1 BRONCHIECTASIS WITH (ACUTE) EXACERBATION: Primary | ICD-10-CM

## 2022-01-19 NOTE — TELEPHONE ENCOUNTER
Pt resides in [facility] for 6 months out of the year and returns to [place] for the other half. Pt is now in [place] and has orders from her physician in [place] to have CBC w/ DIff and CMP drawn every two months, AFB Culture/smear every 1 to 2 months. Pt also mentioned that she needs a EKG as well, but this is not listed on the order our office received? Pt is scheduled to see Leonel RUBY in April, I offered to schedule a sooner appointment with Leonel RUBY to have discussion with labs/EKG, pt stated that she normally does not need to see Leonel RUBY for these orders? Is this correct? [facility] order is in Leonel RUBY folder.

## 2022-01-20 NOTE — TELEPHONE ENCOUNTER
I have placed orders to be performed every 2 months, they are standing order so hopefully this is not problematic.  In terms of the EKG, she does get one every year.  Her last one was February 24, 2021.  The year before that it was done in March.  As this is a little bit more complicated the test to order I think it would be fine for us to wait until her appointment with me in April.

## 2022-01-21 ENCOUNTER — OFFICE VISIT (OUTPATIENT)
Dept: FAMILY MEDICINE CLINIC | Facility: CLINIC | Age: 82
End: 2022-01-21

## 2022-01-21 VITALS
TEMPERATURE: 98.7 F | DIASTOLIC BLOOD PRESSURE: 74 MMHG | BODY MASS INDEX: 23.74 KG/M2 | HEART RATE: 78 BPM | OXYGEN SATURATION: 91 % | HEIGHT: 62 IN | SYSTOLIC BLOOD PRESSURE: 126 MMHG | WEIGHT: 129 LBS

## 2022-01-21 DIAGNOSIS — A31.0 MAI (MYCOBACTERIUM AVIUM-INTRACELLULARE): Primary | ICD-10-CM

## 2022-01-21 DIAGNOSIS — R73.9 HYPERGLYCEMIA: ICD-10-CM

## 2022-01-21 DIAGNOSIS — J47.1 BRONCHIECTASIS WITH (ACUTE) EXACERBATION: ICD-10-CM

## 2022-01-21 PROCEDURE — 93000 ELECTROCARDIOGRAM COMPLETE: CPT | Performed by: FAMILY MEDICINE

## 2022-01-21 PROCEDURE — 99214 OFFICE O/P EST MOD 30 MIN: CPT | Performed by: FAMILY MEDICINE

## 2022-01-21 NOTE — PROGRESS NOTES
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  She is in [place] currently and needs an EKG to monitor some of the medication she is on, she is also due for CBC and sputum culture.  She did take amikacin for 1 month, had an adverse reaction to this since has discontinued this.    Objective   Vital Signs:   /74   Pulse 78   Temp 98.7 °F (37.1 °C)   Ht 157.5 cm (62\")   Wt 58.5 kg (129 lb)   SpO2 91%   BMI 23.59 kg/m²     Physical Exam  Vitals and nursing note reviewed.   Constitutional:       General: She is not in acute distress.     Appearance: She is well-developed.   HENT:      Head: Normocephalic.      Nose: Nose normal.   Cardiovascular:      Rate and Rhythm: Normal rate and regular rhythm.      Heart sounds: Normal heart sounds. No murmur heard.      Pulmonary:      Effort: Pulmonary effort is normal. No respiratory distress.      Breath sounds: Normal breath sounds.      Comments: Scant crackles but really quite a bit better than she was the last time I heard her.  Musculoskeletal:         General: Normal range of motion.   Skin:     General: Skin is warm and dry.      Findings: No rash.   Neurological:      Mental Status: She is alert and oriented to person, place, and time.   Psychiatric:         Behavior: Behavior normal.         Thought Content: Thought content normal.         Judgment: Judgment normal.        Result Review :            ECG 12 Lead    Date/Time: 1/21/2022 2:00 PM  Performed by: Su Castaneda MD  Authorized by: Su Castaneda MD   Comparison: compared with previous ECG from 2/24/2021  Similar to previous ECG  Rhythm: sinus rhythm  Rate: normal  Conduction: conduction normal  ST " Segments: ST segments normal  T Waves: T waves normal  QRS axis: normal  Other: no other findings    Clinical impression: normal ECG              Assessment and Plan    Diagnoses and all orders for this visit:    1. MARIAMA (mycobacterium avium-intracellulare) ([facility]) (Primary)  -     ECG 12 Lead  -     CBC & Differential  -     Comprehensive Metabolic Panel    2. Bronchiectasis with (acute) exacerbation (HCC)  -     CBC & Differential  -     Comprehensive Metabolic Panel    3. Hyperglycemia  -     Hemoglobin A1c    MARIAMA stable, due for EKG for drug monitoring as she is on ethambutol.  No symptoms of palpitations or arrhythmia.  Blood pressure and heart rate are normal.  Due for labs as above.  She has sent a sputum culture to the [facility] to monitor for response to treatments.  She did start having some blood in her sputum and green, yellow changes.  On exam she sounds quite good, in fact better than she did the last time I saw her.  Scant crackles but really quite clear with good air movement.    Hyperglycemia that has been trending up.  We will recheck today.  She has lost some weight so I am hopeful it will come down.      Follow Up   Return in about 3 months (around 4/21/2022), or if symptoms worsen or fail to improve, for Recheck Hyperglycemia .  Patient was given instructions and counseling regarding her condition or for health maintenance advice. Please see specific information pulled into the AVS if appropriate. Medical assistant and I wore mask and eyewear protection during entire encounter.  Patient wore mask.    Su Castaneda MD

## 2022-01-22 LAB
ALBUMIN SERPL-MCNC: 4.4 G/DL (ref 3.6–4.6)
ALBUMIN/GLOB SERPL: 2 {RATIO} (ref 1.2–2.2)
ALP SERPL-CCNC: 54 IU/L (ref 44–121)
ALT SERPL-CCNC: 21 IU/L (ref 0–32)
AST SERPL-CCNC: 22 IU/L (ref 0–40)
BASOPHILS # BLD AUTO: 0.1 X10E3/UL (ref 0–0.2)
BASOPHILS NFR BLD AUTO: 1 %
BILIRUB SERPL-MCNC: 0.3 MG/DL (ref 0–1.2)
BUN SERPL-MCNC: 14 MG/DL (ref 8–27)
BUN/CREAT SERPL: 19 (ref 12–28)
CALCIUM SERPL-MCNC: 9.3 MG/DL (ref 8.7–10.3)
CHLORIDE SERPL-SCNC: 98 MMOL/L (ref 96–106)
CO2 SERPL-SCNC: 27 MMOL/L (ref 20–29)
CREAT SERPL-MCNC: 0.74 MG/DL (ref 0.57–1)
EOSINOPHIL # BLD AUTO: 0.4 X10E3/UL (ref 0–0.4)
EOSINOPHIL NFR BLD AUTO: 6 %
ERYTHROCYTE [DISTWIDTH] IN BLOOD BY AUTOMATED COUNT: 12.3 % (ref 11.7–15.4)
GLOBULIN SER CALC-MCNC: 2.2 G/DL (ref 1.5–4.5)
GLUCOSE SERPL-MCNC: 89 MG/DL (ref 65–99)
HBA1C MFR BLD: 5.8 % (ref 4.8–5.6)
HCT VFR BLD AUTO: 38 % (ref 34–46.6)
HGB BLD-MCNC: 12.8 G/DL (ref 11.1–15.9)
IMM GRANULOCYTES # BLD AUTO: 0 X10E3/UL (ref 0–0.1)
IMM GRANULOCYTES NFR BLD AUTO: 0 %
LYMPHOCYTES # BLD AUTO: 1.8 X10E3/UL (ref 0.7–3.1)
LYMPHOCYTES NFR BLD AUTO: 28 %
MCH RBC QN AUTO: 30.6 PG (ref 26.6–33)
MCHC RBC AUTO-ENTMCNC: 33.7 G/DL (ref 31.5–35.7)
MCV RBC AUTO: 91 FL (ref 79–97)
MONOCYTES # BLD AUTO: 0.5 X10E3/UL (ref 0.1–0.9)
MONOCYTES NFR BLD AUTO: 8 %
NEUTROPHILS # BLD AUTO: 3.7 X10E3/UL (ref 1.4–7)
NEUTROPHILS NFR BLD AUTO: 57 %
PLATELET # BLD AUTO: 267 X10E3/UL (ref 150–450)
POTASSIUM SERPL-SCNC: 4.4 MMOL/L (ref 3.5–5.2)
PROT SERPL-MCNC: 6.6 G/DL (ref 6–8.5)
RBC # BLD AUTO: 4.18 X10E6/UL (ref 3.77–5.28)
SODIUM SERPL-SCNC: 138 MMOL/L (ref 134–144)
WBC # BLD AUTO: 6.5 X10E3/UL (ref 3.4–10.8)

## 2022-01-25 NOTE — PROGRESS NOTES
Please call patient back with results.  The labs has resulted as normal kidney and liver function, normal blood counts, blood sugar is improved from 3 months ago, excellent work!  I will ask Nichelle to forward these on to your pulmonary specialist from [facility].  Please let us know if you have further questions.     Thank you

## 2022-02-24 ENCOUNTER — TELEPHONE (OUTPATIENT)
Dept: FAMILY MEDICINE CLINIC | Facility: CLINIC | Age: 82
End: 2022-02-24

## 2022-02-24 NOTE — TELEPHONE ENCOUNTER
Lt message with Jordy Julia (), pt can call back at her convenience to reschedule canceled lab appointment from 1/21/2022.

## 2022-02-24 NOTE — TELEPHONE ENCOUNTER
Hub staff attempted to follow warm transfer process and was unsuccessful     Caller: Jenniffer Dumont    Relationship to patient: Self    Best call back number: [phone]      Patient is needing: LAB APPT

## 2022-03-04 ENCOUNTER — TELEPHONE (OUTPATIENT)
Dept: FAMILY MEDICINE CLINIC | Facility: CLINIC | Age: 82
End: 2022-03-04

## 2022-03-04 DIAGNOSIS — J47.1 BRONCHIECTASIS WITH (ACUTE) EXACERBATION: ICD-10-CM

## 2022-03-04 DIAGNOSIS — A31.0 MAI (MYCOBACTERIUM AVIUM-INTRACELLULARE): Primary | ICD-10-CM

## 2022-03-05 LAB
ALBUMIN SERPL-MCNC: 4.3 G/DL (ref 3.6–4.6)
ALBUMIN/GLOB SERPL: 2 {RATIO} (ref 1.2–2.2)
ALP SERPL-CCNC: 52 IU/L (ref 44–121)
ALT SERPL-CCNC: 18 IU/L (ref 0–32)
AST SERPL-CCNC: 26 IU/L (ref 0–40)
BASOPHILS # BLD AUTO: 0.1 X10E3/UL (ref 0–0.2)
BASOPHILS NFR BLD AUTO: 1 %
BILIRUB SERPL-MCNC: 0.2 MG/DL (ref 0–1.2)
BUN SERPL-MCNC: 15 MG/DL (ref 8–27)
BUN/CREAT SERPL: 21 (ref 12–28)
CALCIUM SERPL-MCNC: 9.1 MG/DL (ref 8.7–10.3)
CHLORIDE SERPL-SCNC: 98 MMOL/L (ref 96–106)
CO2 SERPL-SCNC: 24 MMOL/L (ref 20–29)
CREAT SERPL-MCNC: 0.73 MG/DL (ref 0.57–1)
EGFR GENE MUT ANL BLD/T: 82 ML/MIN/1.73
EOSINOPHIL # BLD AUTO: 0.2 X10E3/UL (ref 0–0.4)
EOSINOPHIL NFR BLD AUTO: 4 %
ERYTHROCYTE [DISTWIDTH] IN BLOOD BY AUTOMATED COUNT: 11.9 % (ref 11.7–15.4)
GLOBULIN SER CALC-MCNC: 2.1 G/DL (ref 1.5–4.5)
GLUCOSE SERPL-MCNC: 99 MG/DL (ref 65–99)
HCT VFR BLD AUTO: 36.1 % (ref 34–46.6)
HGB BLD-MCNC: 12.6 G/DL (ref 11.1–15.9)
IMM GRANULOCYTES # BLD AUTO: 0 X10E3/UL (ref 0–0.1)
IMM GRANULOCYTES NFR BLD AUTO: 0 %
LYMPHOCYTES # BLD AUTO: 1.9 X10E3/UL (ref 0.7–3.1)
LYMPHOCYTES NFR BLD AUTO: 38 %
MCH RBC QN AUTO: 30.7 PG (ref 26.6–33)
MCHC RBC AUTO-ENTMCNC: 34.9 G/DL (ref 31.5–35.7)
MCV RBC AUTO: 88 FL (ref 79–97)
MONOCYTES # BLD AUTO: 0.5 X10E3/UL (ref 0.1–0.9)
MONOCYTES NFR BLD AUTO: 10 %
NEUTROPHILS # BLD AUTO: 2.4 X10E3/UL (ref 1.4–7)
NEUTROPHILS NFR BLD AUTO: 47 %
PLATELET # BLD AUTO: 241 X10E3/UL (ref 150–450)
POTASSIUM SERPL-SCNC: 4.5 MMOL/L (ref 3.5–5.2)
PROT SERPL-MCNC: 6.4 G/DL (ref 6–8.5)
RBC # BLD AUTO: 4.1 X10E6/UL (ref 3.77–5.28)
SODIUM SERPL-SCNC: 137 MMOL/L (ref 134–144)
WBC # BLD AUTO: 5.1 X10E3/UL (ref 3.4–10.8)

## 2022-03-24 NOTE — PROGRESS NOTES
Please call patient back with results.  The labs has resulted as completely normal kidney, liver function and blood counts.  We can fax this to [facility] if needed. please let us know if you have further questions.     Thank you

## 2022-03-29 DIAGNOSIS — J47.1 BRONCHIECTASIS WITH (ACUTE) EXACERBATION: ICD-10-CM

## 2022-03-29 DIAGNOSIS — E87.1 ACUTE HYPONATREMIA: ICD-10-CM

## 2022-03-29 DIAGNOSIS — A31.0 MAI (MYCOBACTERIUM AVIUM-INTRACELLULARE): ICD-10-CM

## 2022-03-29 DIAGNOSIS — R73.9 HYPERGLYCEMIA: ICD-10-CM

## 2022-04-29 ENCOUNTER — OFFICE VISIT (OUTPATIENT)
Dept: FAMILY MEDICINE CLINIC | Facility: CLINIC | Age: 82
End: 2022-04-29

## 2022-04-29 VITALS
SYSTOLIC BLOOD PRESSURE: 126 MMHG | WEIGHT: 130 LBS | TEMPERATURE: 97.8 F | HEART RATE: 81 BPM | DIASTOLIC BLOOD PRESSURE: 76 MMHG | HEIGHT: 62 IN | OXYGEN SATURATION: 97 % | BODY MASS INDEX: 23.92 KG/M2

## 2022-04-29 DIAGNOSIS — E78.00 PURE HYPERCHOLESTEROLEMIA: ICD-10-CM

## 2022-04-29 DIAGNOSIS — A31.0 MAI (MYCOBACTERIUM AVIUM-INTRACELLULARE): Primary | ICD-10-CM

## 2022-04-29 DIAGNOSIS — M85.852 OSTEOPENIA OF LEFT HIP: ICD-10-CM

## 2022-04-29 DIAGNOSIS — R73.9 HYPERGLYCEMIA: ICD-10-CM

## 2022-04-29 PROCEDURE — 99214 OFFICE O/P EST MOD 30 MIN: CPT | Performed by: FAMILY MEDICINE

## 2022-04-29 NOTE — PROGRESS NOTES
"Chief Complaint  Hyperlipidemia (No complains doing well ) and Hyperglycemia (Follow up with labs doing well )    Subjective          Jenniffer Dumont presents to [facility]  History of Present Illness  Pleasant 81-year-old female here to follow-up for hyperlipidemia which has been well controlled off meds, hyperglycemia to stable.  Bronchiectasis secondary to Mycobacterium avium infection has been gradually worsening.  She is still seeing her pulmonologist at [facility] for this.  She is feeling greatly improved after stopping amikacin which seems to have caused an allergic reaction on top of the MARIAMA which is currently active.    Hyperglycemia, hemoglobin A1c slightly higher.  She does enjoy ice cream regularly.  We discussed the balance between quality of life and maintaining lower risk for complications and need for more medications.    Objective   Vital Signs:   /76   Pulse 81   Temp 97.8 °F (36.6 °C)   Ht 157.5 cm (62\")   Wt 59 kg (130 lb)   SpO2 97%   BMI 23.78 kg/m²     Physical Exam  Vitals and nursing note reviewed.   Constitutional:       General: She is not in acute distress.     Appearance: She is well-developed.   HENT:      Head: Normocephalic.      Nose: Nose normal.   Cardiovascular:      Rate and Rhythm: Normal rate and regular rhythm.      Heart sounds: Normal heart sounds. No murmur heard.  Pulmonary:      Effort: Pulmonary effort is normal. No respiratory distress.      Breath sounds: Normal breath sounds.   Musculoskeletal:         General: Normal range of motion.   Skin:     General: Skin is warm and dry.      Findings: No rash.   Neurological:      Mental Status: She is alert and oriented to person, place, and time.   Psychiatric:         Behavior: Behavior normal.         Thought Content: Thought content normal.         Judgment: Judgment normal.        Result Review :   The following data was reviewed by: Su Castaneda MD on 04/29/2022:  I " discussed labs performed April 22, 2022.  The acid-fast culture is not resulted so we had to call [facility] to get the remaining blood work.  I will summarize below,  CBC within normal limits,  CMP within normal limits except for elevated blood sugar at 103.  GFR 80%.  Normal liver enzymes.  Hemoglobin A1c elevated at 6.0.  5.8 previously.    Will notify patient when acid-fast smear is available so she can let her pulmonologist and [facility] know.              Assessment and Plan    Diagnoses and all orders for this visit:    1. MARIAMA (mycobacterium avium-intracellulare) ([facility]) (Primary)  Comments:  Continue off amikacin with adverse reaction, continue with rifampin, azithromycin, ethambutol for MARIAMA and follow-up with [facility].  Labs reviewed   Orders:  -     Comprehensive Metabolic Panel; Future  -     CBC & Differential; Future    2. Pure hypercholesterolemia  Comments:  No statin at this time, will reassess in 6 months.  Orders:  -     Lipid Panel; Future    3. Hyperglycemia  Comments:  Mild worsening hyperglycemia with hemoglobin A1c at 6.0 compared to 5.8 previous.  Decrease his sweets intake and follow-up in 6 months.  No meds for now.  Orders:  -     Hemoglobin A1c; Future    4. Osteopenia of left hip  -     Vitamin D 25 Hydroxy; Future      BMI is within normal parameters. No follow-up required.         Follow Up   Return in about 6 months (around 10/29/2022), or if symptoms worsen or fail to improve, for Annual Exam with fasting labs prior.  Patient was given instructions and counseling regarding her condition or for health maintenance advice. Please see specific information pulled into the AVS if appropriate. Medical assistant and I wore mask and eyewear protection during entire encounter.  Patient wore mask.    Su Castaneda MD

## 2022-05-19 ENCOUNTER — APPOINTMENT (OUTPATIENT)
Dept: WOMENS IMAGING | Facility: HOSPITAL | Age: 82
End: 2022-05-19

## 2022-05-19 PROCEDURE — 77063 BREAST TOMOSYNTHESIS BI: CPT | Performed by: RADIOLOGY

## 2022-05-19 PROCEDURE — 77067 SCR MAMMO BI INCL CAD: CPT | Performed by: RADIOLOGY

## 2022-06-06 LAB
ACID FAST STN SPEC: NORMAL
ALBUMIN SERPL-MCNC: 4.4 G/DL (ref 3.6–4.6)
ALBUMIN/GLOB SERPL: 1.9 {RATIO} (ref 1.2–2.2)
ALP SERPL-CCNC: 52 IU/L (ref 44–121)
ALT SERPL-CCNC: 17 IU/L (ref 0–32)
AST SERPL-CCNC: 22 IU/L (ref 0–40)
BASOPHILS # BLD AUTO: 0.1 X10E3/UL (ref 0–0.2)
BASOPHILS NFR BLD AUTO: 1 %
BILIRUB SERPL-MCNC: 0.3 MG/DL (ref 0–1.2)
BUN SERPL-MCNC: 12 MG/DL (ref 8–27)
BUN/CREAT SERPL: 16 (ref 12–28)
CALCIUM SERPL-MCNC: 9.5 MG/DL (ref 8.7–10.3)
CHLORIDE SERPL-SCNC: 99 MMOL/L (ref 96–106)
CO2 SERPL-SCNC: 25 MMOL/L (ref 20–29)
CREAT SERPL-MCNC: 0.74 MG/DL (ref 0.57–1)
EGFRCR SERPLBLD CKD-EPI 2021: 80 ML/MIN/1.73
EOSINOPHIL # BLD AUTO: 0.2 X10E3/UL (ref 0–0.4)
EOSINOPHIL NFR BLD AUTO: 4 %
ERYTHROCYTE [DISTWIDTH] IN BLOOD BY AUTOMATED COUNT: 12 % (ref 11.7–15.4)
GLOBULIN SER CALC-MCNC: 2.3 G/DL (ref 1.5–4.5)
GLUCOSE SERPL-MCNC: 103 MG/DL (ref 65–99)
HBA1C MFR BLD: 6 % (ref 4.8–5.6)
HCT VFR BLD AUTO: 36.2 % (ref 34–46.6)
HGB BLD-MCNC: 12.7 G/DL (ref 11.1–15.9)
IMM GRANULOCYTES # BLD AUTO: 0 X10E3/UL (ref 0–0.1)
IMM GRANULOCYTES NFR BLD AUTO: 0 %
LYMPHOCYTES # BLD AUTO: 1.6 X10E3/UL (ref 0.7–3.1)
LYMPHOCYTES NFR BLD AUTO: 37 %
MCH RBC QN AUTO: 31.2 PG (ref 26.6–33)
MCHC RBC AUTO-ENTMCNC: 35.1 G/DL (ref 31.5–35.7)
MCV RBC AUTO: 89 FL (ref 79–97)
MONOCYTES # BLD AUTO: 0.4 X10E3/UL (ref 0.1–0.9)
MONOCYTES NFR BLD AUTO: 9 %
MYCOBACTERIUM SPEC QL CULT: NEGATIVE
NEUTROPHILS # BLD AUTO: 2 X10E3/UL (ref 1.4–7)
NEUTROPHILS NFR BLD AUTO: 49 %
PLATELET # BLD AUTO: 274 X10E3/UL (ref 150–450)
POTASSIUM SERPL-SCNC: 4.5 MMOL/L (ref 3.5–5.2)
PROT SERPL-MCNC: 6.7 G/DL (ref 6–8.5)
RBC # BLD AUTO: 4.07 X10E6/UL (ref 3.77–5.28)
SODIUM SERPL-SCNC: 139 MMOL/L (ref 134–144)
SPECIMEN PREPARATION: NORMAL
WBC # BLD AUTO: 4.2 X10E3/UL (ref 3.4–10.8)

## 2022-06-21 ENCOUNTER — TELEPHONE (OUTPATIENT)
Dept: FAMILY MEDICINE CLINIC | Facility: CLINIC | Age: 82
End: 2022-06-21

## 2022-06-21 NOTE — TELEPHONE ENCOUNTER
Caller: Jenniffer Dumont    Relationship: Self    Best call back number: [phone]      PATIENT RECEIVED A LETTER SAYING THAT THE OFFICE HAS BEEN TRYING TO CALL ABOUT TEST RESULTS. PATIENT STATED THAT THEY ARE AT THEIR VACATION HOME IN [place] AND THAT SHE CAN BE REACHED AT THE PHONE NUMBER ABOVE. PLEASE CALL AND ADVISE PATIENT.

## 2022-10-04 ENCOUNTER — LAB (OUTPATIENT)
Dept: LAB | Facility: HOSPITAL | Age: 82
End: 2022-10-04

## 2022-10-04 ENCOUNTER — TRANSCRIBE ORDERS (OUTPATIENT)
Dept: ADMINISTRATIVE | Facility: HOSPITAL | Age: 82
End: 2022-10-04

## 2022-10-04 DIAGNOSIS — J20.9 ACUTE BRONCHITIS, UNSPECIFIED ORGANISM: Primary | ICD-10-CM

## 2022-10-04 PROCEDURE — 87116 MYCOBACTERIA CULTURE: CPT | Performed by: FAMILY MEDICINE

## 2022-10-04 PROCEDURE — 87186 SC STD MICRODIL/AGAR DIL: CPT

## 2022-10-04 PROCEDURE — 87158 CULTURE TYPING ADDED METHOD: CPT

## 2022-10-04 PROCEDURE — 87206 SMEAR FLUORESCENT/ACID STAI: CPT | Performed by: FAMILY MEDICINE

## 2022-10-14 DIAGNOSIS — J47.9 BRONCHIECTASIS WITHOUT COMPLICATION: ICD-10-CM

## 2022-10-14 DIAGNOSIS — R05.9 COUGH: ICD-10-CM

## 2022-10-14 DIAGNOSIS — A31.0 MAI (MYCOBACTERIUM AVIUM-INTRACELLULARE): ICD-10-CM

## 2022-10-14 RX ORDER — BUDESONIDE AND FORMOTEROL FUMARATE DIHYDRATE 80; 4.5 UG/1; UG/1
AEROSOL RESPIRATORY (INHALATION)
Qty: 10.2 G | Refills: 6 | Status: SHIPPED | OUTPATIENT
Start: 2022-10-14 | End: 2023-01-19

## 2022-10-26 ENCOUNTER — TELEPHONE (OUTPATIENT)
Dept: FAMILY MEDICINE CLINIC | Facility: CLINIC | Age: 82
End: 2022-10-26

## 2022-10-26 NOTE — TELEPHONE ENCOUNTER
Patient arrived around 1:20 pm to drop off sputum samples. I informed patient that Dr Castaneda nor her medical assistant were here on Wednesdays. Patient stated that her [facility] physician wanted to check her sputum to see what type of antibiotic needed to be prescribed, and that it needed to be refrigerated?  I informed patient that I would take the samples, but cannot guarantee the samples will be of any use, may have to re-do? Not having an order, nor knowing how to properly store. She is also in need of lab work and a EKG per [facility] physician. Orders have been scanned into patients media. Patient is scheduled to see Dr Castaneda on Monday Oct 31, in regards to this.

## 2022-10-26 NOTE — TELEPHONE ENCOUNTER
Caller: Jenniffer Dumont    Relationship: Self    Best call back number: [phone]    What orders are you requesting (i.e. lab or imaging): SPUTUM CULTURE     In what timeframe would the patient need to come in: ASAP    Where will you receive your lab/imaging services: OFFICE     Additional notes: PATIENT CALLED AND SAID THE [facility] WANTED HER TO HAVE A SPUTUM CULTURE DONE, AND SHE SAID SHE HAS THE CONTAINER WITH THE SPECIMEN IN IT, AND IS WANTING TO SEE IF SHE CAN BRING IT INTO THE OFFICE FOR TESTING     SHE SAID THE [facility] IS TREATING HER FOR A RARE LUNG DIEASE AND THEY NEED THIS CULTURE SO THEY KNOW WHICH MEDICATION THEY CAN PUT HER ON     SHE IS ABLE TO BRING IT IN ANYTIME TODAY

## 2022-10-27 NOTE — TELEPHONE ENCOUNTER
So it looks like there is an order present on the chart but it is actually with Dr. Aguiar her pulmonologist -she will need to go to a different [facility] to have this performed since he is not a physician at this office.  I would be best to use the order that he placed of the results go to him as well.

## 2022-10-31 ENCOUNTER — OFFICE VISIT (OUTPATIENT)
Dept: FAMILY MEDICINE CLINIC | Facility: CLINIC | Age: 82
End: 2022-10-31

## 2022-10-31 VITALS
HEIGHT: 62 IN | BODY MASS INDEX: 23.37 KG/M2 | WEIGHT: 127 LBS | TEMPERATURE: 97.8 F | SYSTOLIC BLOOD PRESSURE: 116 MMHG | OXYGEN SATURATION: 98 % | DIASTOLIC BLOOD PRESSURE: 72 MMHG | HEART RATE: 78 BPM

## 2022-10-31 DIAGNOSIS — R53.82 CHRONIC FATIGUE: ICD-10-CM

## 2022-10-31 DIAGNOSIS — M85.852 OSTEOPENIA OF LEFT HIP: ICD-10-CM

## 2022-10-31 DIAGNOSIS — R73.9 HYPERGLYCEMIA: ICD-10-CM

## 2022-10-31 DIAGNOSIS — A31.0 MAI (MYCOBACTERIUM AVIUM-INTRACELLULARE): Primary | ICD-10-CM

## 2022-10-31 DIAGNOSIS — Z51.81 THERAPEUTIC DRUG MONITORING: ICD-10-CM

## 2022-10-31 DIAGNOSIS — E78.00 PURE HYPERCHOLESTEROLEMIA: ICD-10-CM

## 2022-10-31 DIAGNOSIS — J47.1 BRONCHIECTASIS WITH (ACUTE) EXACERBATION: ICD-10-CM

## 2022-10-31 PROCEDURE — 99214 OFFICE O/P EST MOD 30 MIN: CPT | Performed by: FAMILY MEDICINE

## 2022-10-31 RX ORDER — PANTOPRAZOLE SODIUM 20 MG/1
20 TABLET, DELAYED RELEASE ORAL DAILY
COMMUNITY
Start: 2022-09-08

## 2022-10-31 RX ORDER — LEVOFLOXACIN 750 MG/1
750 TABLET ORAL
COMMUNITY
Start: 2022-10-25

## 2022-11-01 LAB
25(OH)D3+25(OH)D2 SERPL-MCNC: 37.3 NG/ML (ref 30–100)
ALBUMIN SERPL-MCNC: 4.1 G/DL (ref 3.5–5.2)
ALBUMIN/GLOB SERPL: 2.1 G/DL
ALP SERPL-CCNC: 48 U/L (ref 39–117)
ALT SERPL-CCNC: 16 U/L (ref 1–33)
AST SERPL-CCNC: 20 U/L (ref 1–32)
BASOPHILS # BLD AUTO: 0.05 10*3/MM3 (ref 0–0.2)
BASOPHILS NFR BLD AUTO: 1 % (ref 0–1.5)
BILIRUB SERPL-MCNC: 0.5 MG/DL (ref 0–1.2)
BUN SERPL-MCNC: 10 MG/DL (ref 8–23)
BUN/CREAT SERPL: 12.8 (ref 7–25)
CALCIUM SERPL-MCNC: 9.2 MG/DL (ref 8.6–10.5)
CHLORIDE SERPL-SCNC: 100 MMOL/L (ref 98–107)
CHOLEST SERPL-MCNC: 262 MG/DL (ref 0–200)
CO2 SERPL-SCNC: 28.2 MMOL/L (ref 22–29)
CREAT SERPL-MCNC: 0.78 MG/DL (ref 0.57–1)
EGFRCR SERPLBLD CKD-EPI 2021: 75 ML/MIN/1.73
EOSINOPHIL # BLD AUTO: 0.12 10*3/MM3 (ref 0–0.4)
EOSINOPHIL NFR BLD AUTO: 2.4 % (ref 0.3–6.2)
ERYTHROCYTE [DISTWIDTH] IN BLOOD BY AUTOMATED COUNT: 12.1 % (ref 12.3–15.4)
GLOBULIN SER CALC-MCNC: 2 GM/DL
GLUCOSE SERPL-MCNC: 114 MG/DL (ref 65–99)
HBA1C MFR BLD: 5.8 % (ref 4.8–5.6)
HCT VFR BLD AUTO: 34.4 % (ref 34–46.6)
HDLC SERPL-MCNC: 85 MG/DL (ref 40–60)
HGB BLD-MCNC: 11.9 G/DL (ref 12–15.9)
IMM GRANULOCYTES # BLD AUTO: 0.02 10*3/MM3 (ref 0–0.05)
IMM GRANULOCYTES NFR BLD AUTO: 0.4 % (ref 0–0.5)
LDLC SERPL CALC-MCNC: 166 MG/DL (ref 0–100)
LYMPHOCYTES # BLD AUTO: 1.31 10*3/MM3 (ref 0.7–3.1)
LYMPHOCYTES NFR BLD AUTO: 25.9 % (ref 19.6–45.3)
MCH RBC QN AUTO: 31.1 PG (ref 26.6–33)
MCHC RBC AUTO-ENTMCNC: 34.6 G/DL (ref 31.5–35.7)
MCV RBC AUTO: 89.8 FL (ref 79–97)
MONOCYTES # BLD AUTO: 0.51 10*3/MM3 (ref 0.1–0.9)
MONOCYTES NFR BLD AUTO: 10.1 % (ref 5–12)
NEUTROPHILS # BLD AUTO: 3.05 10*3/MM3 (ref 1.7–7)
NEUTROPHILS NFR BLD AUTO: 60.2 % (ref 42.7–76)
NRBC BLD AUTO-RTO: 0 /100 WBC (ref 0–0.2)
PLATELET # BLD AUTO: 259 10*3/MM3 (ref 140–450)
POTASSIUM SERPL-SCNC: 4 MMOL/L (ref 3.5–5.2)
PROT SERPL-MCNC: 6.1 G/DL (ref 6–8.5)
RBC # BLD AUTO: 3.83 10*6/MM3 (ref 3.77–5.28)
SODIUM SERPL-SCNC: 136 MMOL/L (ref 136–145)
TRIGL SERPL-MCNC: 70 MG/DL (ref 0–150)
TSH SERPL DL<=0.005 MIU/L-ACNC: 2.02 UIU/ML (ref 0.27–4.2)
VLDLC SERPL CALC-MCNC: 11 MG/DL (ref 5–40)
WBC # BLD AUTO: 5.06 10*3/MM3 (ref 3.4–10.8)

## 2022-11-03 PROCEDURE — 93000 ELECTROCARDIOGRAM COMPLETE: CPT | Performed by: FAMILY MEDICINE

## 2022-11-10 NOTE — PROGRESS NOTES
Please call patient back with results.  The labs has resulted as overall stable from prior, normal kidney and liver function, slight anemia when compared to prior, cholesterol is mildly elevated that is stable, normal vitamin D and thyroid function.  Prediabetes is slightly improved.  I would recommend increasing iron intake.  We can do some stool studies to see if there is any blood in the stool.  She is welcome to come to the office to  a sample.  Otherwise we can repeat CBC only with her next visit in 3 months..  Please let us know if you have further questions.     Thank you

## 2022-11-11 ENCOUNTER — TELEPHONE (OUTPATIENT)
Dept: FAMILY MEDICINE CLINIC | Facility: CLINIC | Age: 82
End: 2022-11-11

## 2022-11-15 ENCOUNTER — TELEPHONE (OUTPATIENT)
Dept: FAMILY MEDICINE CLINIC | Facility: CLINIC | Age: 82
End: 2022-11-15

## 2022-11-15 DIAGNOSIS — A31.0 MAI (MYCOBACTERIUM AVIUM-INTRACELLULARE): ICD-10-CM

## 2022-11-15 DIAGNOSIS — R53.82 CHRONIC FATIGUE: Primary | ICD-10-CM

## 2022-11-15 DIAGNOSIS — J47.9 BRONCHIECTASIS WITHOUT COMPLICATION: ICD-10-CM

## 2022-11-15 DIAGNOSIS — R78.71 ABNORMAL LEAD LEVEL IN BLOOD: ICD-10-CM

## 2022-11-15 DIAGNOSIS — D64.9 ANEMIA, UNSPECIFIED TYPE: ICD-10-CM

## 2022-11-15 DIAGNOSIS — J47.1 BRONCHIECTASIS WITH (ACUTE) EXACERBATION: ICD-10-CM

## 2022-11-15 NOTE — TELEPHONE ENCOUNTER
PATIENT STATES DR. DARWIN URIBE AT [facility] HAS BEEN TREATING PATIENT AS SHE HAS BEEN SUPER SICK FOR ABOUT A MONTH AND THEY CAN NOT PINPOINT REASON... DR. URIBE IS THINKING PATIENT MIGHT HAVE SOME CARDIAC ISSUES AND REQUEST DR. ALEJANDRO ORDER A EKG FOR PATIENT. PLEASE DO SO. ALSO NO AVAILABLE APTS WITH DR. ALEJANDRO UNTIL 12/27 BUT PATIENT WOULD LIKE TO BE WORKED IN SOONER TO GO OVER CARE AT [facility] AND HAVE HER ON BOARD.     PATIENT> [phone]

## 2022-11-15 NOTE — TELEPHONE ENCOUNTER
Yes, please fax EKG to [facility].  Thank you    I am also glad to work her in at the end of the week, okay to overbook her on Thursday at the end of the day. (Unfortunately have a doctor's appointment on Friday so I cannot work her in at the end of Friday)

## 2022-11-18 ENCOUNTER — OFFICE VISIT (OUTPATIENT)
Dept: FAMILY MEDICINE CLINIC | Facility: CLINIC | Age: 82
End: 2022-11-18

## 2022-11-18 VITALS
OXYGEN SATURATION: 99 % | HEIGHT: 62 IN | TEMPERATURE: 97.8 F | BODY MASS INDEX: 22.91 KG/M2 | WEIGHT: 124.5 LBS | DIASTOLIC BLOOD PRESSURE: 64 MMHG | HEART RATE: 73 BPM | SYSTOLIC BLOOD PRESSURE: 116 MMHG

## 2022-11-18 DIAGNOSIS — R00.2 PALPITATION: ICD-10-CM

## 2022-11-18 DIAGNOSIS — Z79.899 MEDICATION MANAGEMENT: ICD-10-CM

## 2022-11-18 DIAGNOSIS — J47.1 BRONCHIECTASIS WITH (ACUTE) EXACERBATION: ICD-10-CM

## 2022-11-18 DIAGNOSIS — R09.89 PULSE IRREGULARITY: ICD-10-CM

## 2022-11-18 DIAGNOSIS — A31.0 MAI (MYCOBACTERIUM AVIUM-INTRACELLULARE): Primary | ICD-10-CM

## 2022-11-18 DIAGNOSIS — R53.83 OTHER FATIGUE: ICD-10-CM

## 2022-11-18 PROCEDURE — 93000 ELECTROCARDIOGRAM COMPLETE: CPT | Performed by: FAMILY MEDICINE

## 2022-11-18 PROCEDURE — 99214 OFFICE O/P EST MOD 30 MIN: CPT | Performed by: FAMILY MEDICINE

## 2022-11-18 RX ORDER — DOXYCYCLINE HYCLATE 100 MG/1
100 CAPSULE ORAL 2 TIMES DAILY
COMMUNITY
Start: 2022-10-03

## 2022-11-18 RX ORDER — DOXYCYCLINE 100 MG/1
TABLET ORAL
COMMUNITY
Start: 2022-11-09

## 2022-11-18 NOTE — PROGRESS NOTES
"Chief Complaint  Fatigue (Not doing to well  tired and   after quitting all medication except for doxy  not feeling better has diarrhea from the doxy  )    Subjective        Jenniffer Dumont presents to [facility]  History of Present Illness  Pleasant 82-year-old female here for fatigue and bronchiectasis due to MARIAMA..  She unfortunately has a more severe case, she is treated by a doctor at [facility] most of the time but does have a pulmonologist locally as well.  She has been given doxycycline for pneumonia due to complete course November 29, 2022.  She was recommended to follow-up with me due to irregular pulse and feeling jittery.  We did do labs last week, hemoglobin n mildly low but not enough to cause her symptoms with a hemoglobin of 11.9, hemoglobin A1c of 5.8 that has been stable.  Electrolytes were normal, thyroid function normal.  I also did an EKG November 3, 2022 that was normal.  Over the last 24 hours she has started to have a change in her sputum color from clear to yellow.  Over the entire fall she has not had any yellow sputum until today.  No fevers, some more mucus production today than usual.    h eis now on clofazimine and ethambutol and she was only ethambutol at her last appointment.  She was started on Clophasemine and now feels exausted even with miniml activity of even making her bed.     Wednesday she was feeling jittery and would feel cold.  Her HR would be regular at 60 but would skip a beat     Objective   Vital Signs:  /64   Pulse 73   Temp 97.8 °F (36.6 °C)   Ht 157.5 cm (62\")   Wt 56.5 kg (124 lb 8 oz)   SpO2 99%   BMI 22.77 kg/m²   Estimated body mass index is 22.77 kg/m² as calculated from the following:    Height as of this encounter: 157.5 cm (62\").    Weight as of this encounter: 56.5 kg (124 lb 8 oz).    BMI is within normal parameters. No other follow-up for BMI required.      Physical Exam  Vitals and nursing note reviewed. "   Constitutional:       General: She is not in acute distress.     Appearance: She is well-developed.   HENT:      Head: Normocephalic.      Nose: Nose normal.   Cardiovascular:      Rate and Rhythm: Normal rate and regular rhythm.      Heart sounds: Normal heart sounds. No murmur heard.  Pulmonary:      Effort: Pulmonary effort is normal. No respiratory distress.      Breath sounds: Normal breath sounds.   Musculoskeletal:         General: Normal range of motion.   Skin:     General: Skin is warm and dry.      Findings: No rash.   Neurological:      Mental Status: She is alert and oriented to person, place, and time.   Psychiatric:         Behavior: Behavior normal.         Thought Content: Thought content normal.         Judgment: Judgment normal.        Result Review :           ECG 12 Lead    Date/Time: 11/18/2022 10:57 AM  Performed by: Su Castaneda MD  Authorized by: Su Castaneda MD   Interpreted by ED physician: Micheline.  Comparison: compared with previous ECG from 11/3/2022  Similar to previous ECG  Rhythm: sinus rhythm  Rate: normal  Conduction: conduction normal  ST Segments: ST segments normal  T Waves: T waves normal  QRS axis: normal  Other: no other findings    Clinical impression: normal ECG  Comments: EKG performed today,               Assessment and Plan   Diagnoses and all orders for this visit:    1. MARIAMA (mycobacterium avium-intracellulare) ([facility]) (Primary)  -     Respiratory Culture - Sputum, Throat  -     Cancel: AFB Culture - Sputum, Oropharynx  -     Ambulatory Referral to Pulmonology    2. Pulse irregularity  -     ECG 12 Lead  -     Holter Monitor - 72 Hour Up To 15 Days; Future    3. Other fatigue    4. Medication management  -     ECG 12 Lead  -     Holter Monitor - 72 Hour Up To 15 Days; Future    5. Palpitation  -     Holter Monitor - 72 Hour Up To 15 Days; Future    6. Bronchiectasis with (acute) exacerbation ([facility])  -     Ambulatory Referral to Pulmonology      Pleasant  82-year-old female here to follow-up for symptoms of palpitation since starting a new medication over the last 2 weeks for bronchiectasis due to MARIAMA.  She was recently started on clofazimine in addition to ethambutol which both can prolong the QTC.  She has stopped the azithromycin and is now only on doxycycline for infection.  EKG performed today showing an improved QTC from prior now at 399 versus 410 before.  However she is having some symptoms of possibly skipping a pulse or feeling jittery at home.  Labs are reassuring, vitals reassuring.  She does have more yellow mucus production today than usual.    Plan:  - We will do home Holter monitor to see if this can help catch some episodes of any arrhythmia at home  - I do not see evidence of QT prolongation due to medication changes.  It is possible that some of the improvement in the QTC has been due to stopping azithromycin although I do not think it would be a contraindication to restarting.  Happy to recheck an EKG if she restarts the azithromycin again.  -If her symptoms continue to persist and I am not able to find a cause I would recommend seeing a cardiologist otherwise no she does not want to see more doctors at this time.  -Sputum culture in light of new symptoms of yellow mucus color today.  -I do think it would be helpful to see a pulmonologist that may have more experience with MARIAMA and  -I also have reached out to Dr. Williamson to see if he would be familiar with some of the complications of her course and if he be willing to take her on as a patient.        Follow Up   Return in about 3 months (around 2/18/2023), or if symptoms worsen or fail to improve, for Annual Exam with fasting labs prior.  Patient was given instructions and counseling regarding her condition or for health maintenance advice. Please see specific information pulled into the AVS if appropriate. Medical assistant and I wore mask and eyewear protection during entire encounter.  Patient  wore mask.    Su Castaneda MD

## 2022-12-20 ENCOUNTER — LAB (OUTPATIENT)
Dept: LAB | Facility: HOSPITAL | Age: 82
End: 2022-12-20

## 2022-12-20 LAB
BACTERIA SPEC RESP CULT: NORMAL
GRAM STN SPEC: NORMAL

## 2022-12-20 PROCEDURE — 87206 SMEAR FLUORESCENT/ACID STAI: CPT | Performed by: INTERNAL MEDICINE

## 2022-12-20 PROCEDURE — 87116 MYCOBACTERIA CULTURE: CPT | Performed by: INTERNAL MEDICINE

## 2022-12-20 PROCEDURE — 87205 SMEAR GRAM STAIN: CPT | Performed by: FAMILY MEDICINE

## 2023-01-05 LAB
MYCOBACTERIUM SPEC CULT: ABNORMAL
NIGHT BLUE STAIN TISS: ABNORMAL

## 2023-01-19 DIAGNOSIS — J47.9 BRONCHIECTASIS WITHOUT COMPLICATION: ICD-10-CM

## 2023-01-19 DIAGNOSIS — R05.9 COUGH: ICD-10-CM

## 2023-01-19 DIAGNOSIS — A31.0 MAI (MYCOBACTERIUM AVIUM-INTRACELLULARE): ICD-10-CM

## 2023-01-19 RX ORDER — DILTIAZEM HYDROCHLORIDE 60 MG/1
TABLET, FILM COATED ORAL
Qty: 10.2 G | Refills: 6 | Status: SHIPPED | OUTPATIENT
Start: 2023-01-19

## 2023-01-19 NOTE — TELEPHONE ENCOUNTER
Rx Refill Note  Requested Prescriptions     Pending Prescriptions Disp Refills   • Symbicort 80-4.5 MCG/ACT inhaler [Pharmacy Med Name: SYMBICORT 80/4.5MCG (120  ORAL INH)] 10.2 g 6     Sig: INHALE 2 PUFFS BY MOUTH DAILY      Last office visit with prescribing clinician: 11/18/2022   Last telemedicine visit with prescribing clinician: 6/13/2023   Next office visit with prescribing clinician: 6/13/2023                         Would you like a call back once the refill request has been completed: [] Yes [] No    If the office needs to give you a call back, can they leave a voicemail: [] Yes [] No    Amanda Bautista MA/LMR  01/19/23, 13:45 EST

## 2023-02-14 ENCOUNTER — TELEPHONE (OUTPATIENT)
Dept: FAMILY MEDICINE CLINIC | Facility: CLINIC | Age: 83
End: 2023-02-14
Payer: MEDICARE

## 2023-02-14 DIAGNOSIS — A31.0 MAI (MYCOBACTERIUM AVIUM-INTRACELLULARE): Primary | ICD-10-CM

## 2023-02-14 DIAGNOSIS — A31.0 MAI (MYCOBACTERIUM AVIUM-INTRACELLULARE): ICD-10-CM

## 2023-02-14 NOTE — TELEPHONE ENCOUNTER
Patient stopped in to request 2 sterile sputum containers. An order has been placed. Patient is aware to take specimen to [facility] lab facility. Orders placed for CBC and CMP to be drawn. Results should be faxed to [facility] Dept of Infectious Diseases, Dr.Cyndee Fisher     Fax: [phone]

## 2023-02-15 ENCOUNTER — LAB (OUTPATIENT)
Dept: LAB | Facility: HOSPITAL | Age: 83
End: 2023-02-15
Payer: MEDICARE

## 2023-02-15 PROCEDURE — 87070 CULTURE OTHR SPECIMN AEROBIC: CPT | Performed by: FAMILY MEDICINE

## 2023-02-15 PROCEDURE — 87205 SMEAR GRAM STAIN: CPT | Performed by: FAMILY MEDICINE

## 2023-02-16 LAB
ALBUMIN SERPL-MCNC: 4.3 G/DL (ref 3.5–5.2)
ALBUMIN/GLOB SERPL: 2 G/DL
ALP SERPL-CCNC: 54 U/L (ref 39–117)
ALT SERPL-CCNC: 24 U/L (ref 1–33)
AST SERPL-CCNC: 21 U/L (ref 1–32)
BASOPHILS # BLD AUTO: 0.07 10*3/MM3 (ref 0–0.2)
BASOPHILS NFR BLD AUTO: 1.1 % (ref 0–1.5)
BILIRUB SERPL-MCNC: 0.6 MG/DL (ref 0–1.2)
BUN SERPL-MCNC: 15 MG/DL (ref 8–23)
BUN/CREAT SERPL: 17.6 (ref 7–25)
CALCIUM SERPL-MCNC: 9.4 MG/DL (ref 8.6–10.5)
CHLORIDE SERPL-SCNC: 98 MMOL/L (ref 98–107)
CO2 SERPL-SCNC: 27.8 MMOL/L (ref 22–29)
CREAT SERPL-MCNC: 0.85 MG/DL (ref 0.57–1)
EGFRCR SERPLBLD CKD-EPI 2021: 67.7 ML/MIN/1.73
EOSINOPHIL # BLD AUTO: 0.37 10*3/MM3 (ref 0–0.4)
EOSINOPHIL NFR BLD AUTO: 5.6 % (ref 0.3–6.2)
ERYTHROCYTE [DISTWIDTH] IN BLOOD BY AUTOMATED COUNT: 12.7 % (ref 12.3–15.4)
GLOBULIN SER CALC-MCNC: 2.1 GM/DL
GLUCOSE SERPL-MCNC: 97 MG/DL (ref 65–99)
HCT VFR BLD AUTO: 39.1 % (ref 34–46.6)
HGB BLD-MCNC: 13.2 G/DL (ref 12–15.9)
IMM GRANULOCYTES # BLD AUTO: 0.02 10*3/MM3 (ref 0–0.05)
IMM GRANULOCYTES NFR BLD AUTO: 0.3 % (ref 0–0.5)
LYMPHOCYTES # BLD AUTO: 1.49 10*3/MM3 (ref 0.7–3.1)
LYMPHOCYTES NFR BLD AUTO: 22.5 % (ref 19.6–45.3)
MCH RBC QN AUTO: 30.8 PG (ref 26.6–33)
MCHC RBC AUTO-ENTMCNC: 33.8 G/DL (ref 31.5–35.7)
MCV RBC AUTO: 91.4 FL (ref 79–97)
MONOCYTES # BLD AUTO: 0.62 10*3/MM3 (ref 0.1–0.9)
MONOCYTES NFR BLD AUTO: 9.4 % (ref 5–12)
NEUTROPHILS # BLD AUTO: 4.04 10*3/MM3 (ref 1.7–7)
NEUTROPHILS NFR BLD AUTO: 61.1 % (ref 42.7–76)
NRBC BLD AUTO-RTO: 0 /100 WBC (ref 0–0.2)
PLATELET # BLD AUTO: 313 10*3/MM3 (ref 140–450)
POTASSIUM SERPL-SCNC: 4.3 MMOL/L (ref 3.5–5.2)
PROT SERPL-MCNC: 6.4 G/DL (ref 6–8.5)
RBC # BLD AUTO: 4.28 10*6/MM3 (ref 3.77–5.28)
SODIUM SERPL-SCNC: 138 MMOL/L (ref 136–145)
WBC # BLD AUTO: 6.61 10*3/MM3 (ref 3.4–10.8)

## 2023-02-16 NOTE — PROGRESS NOTES
Please call patient back with results.  The labs has resulted as normal blood counts kidney and liver function.  Please fax them to her infectious disease doctor at [facility].  Please let us know if you have further questions.     Thank you

## 2023-02-17 LAB
BACTERIA SPEC RESP CULT: NORMAL
GRAM STN SPEC: NORMAL

## 2023-10-16 ENCOUNTER — TELEPHONE (OUTPATIENT)
Dept: FAMILY MEDICINE CLINIC | Facility: CLINIC | Age: 83
End: 2023-10-16
Payer: MEDICARE

## 2023-10-19 ENCOUNTER — OFFICE VISIT (OUTPATIENT)
Dept: FAMILY MEDICINE CLINIC | Facility: CLINIC | Age: 83
End: 2023-10-19
Payer: MEDICARE

## 2023-10-19 VITALS
SYSTOLIC BLOOD PRESSURE: 122 MMHG | OXYGEN SATURATION: 98 % | DIASTOLIC BLOOD PRESSURE: 70 MMHG | TEMPERATURE: 98.7 F | HEART RATE: 69 BPM | HEIGHT: 62 IN | WEIGHT: 122 LBS | BODY MASS INDEX: 22.45 KG/M2

## 2023-10-19 DIAGNOSIS — R00.2 PALPITATION: Primary | ICD-10-CM

## 2023-10-19 DIAGNOSIS — E78.00 PURE HYPERCHOLESTEROLEMIA: ICD-10-CM

## 2023-10-19 DIAGNOSIS — A31.0 MAI (MYCOBACTERIUM AVIUM-INTRACELLULARE): ICD-10-CM

## 2023-10-19 DIAGNOSIS — J47.1 BRONCHIECTASIS WITH (ACUTE) EXACERBATION: ICD-10-CM

## 2023-10-19 DIAGNOSIS — M85.852 OSTEOPENIA OF LEFT HIP: ICD-10-CM

## 2023-10-19 DIAGNOSIS — R73.9 HYPERGLYCEMIA: ICD-10-CM

## 2023-10-19 PROCEDURE — 1160F RVW MEDS BY RX/DR IN RCRD: CPT | Performed by: FAMILY MEDICINE

## 2023-10-19 PROCEDURE — 1159F MED LIST DOCD IN RCRD: CPT | Performed by: FAMILY MEDICINE

## 2023-10-19 RX ORDER — FLUTICASONE PROPIONATE AND SALMETEROL 50; 100 UG/1; UG/1
1 POWDER RESPIRATORY (INHALATION)
COMMUNITY
Start: 2023-10-12

## 2023-10-19 NOTE — PROGRESS NOTES
"Chief Complaint  Palpitations (Feels having heart palpitations   and her pulse went up 170   at least 4 times  in the last    ,stopped all  medications   that could affect her heart  her doctor at the [facility]   advise to have another ecg )    Subjective        Jenniffer Dumont presents to [facility]  History of Present Illness  Pleasant 83-year-old female here for palpitations.  She has been monitored closely by pulmonology at the [facility] for MARIAMA, previously on ethambutol, Levaquin, rifampin and doxycycline, episodes of azithromycin.  Most presently clofazemine 7/2023- 9/2023, ethambutol and azithromycin.   We were doing EKGs to monitor for the QTc interval which did show some prolongation. SHe has had some stress, but no chip or alcohol.  He will go all of a suddden into a very rapid heart rate and will cause her to breath harder, no chest pain.  She used a pulse oximeter and the HR went down on it own in 30 min and then had an irregular pulse.  She has since been taken off these meds and is feeling better.  She does not want to take a heart medication.    She does not want to take a bunch of meds.     Objective   Vital Signs:  /70   Pulse 69   Temp 98.7 °F (37.1 °C)   Ht 157.5 cm (62\")   Wt 55.3 kg (122 lb)   SpO2 98%   BMI 22.31 kg/m²   Estimated body mass index is 22.31 kg/m² as calculated from the following:    Height as of this encounter: 157.5 cm (62\").    Weight as of this encounter: 55.3 kg (122 lb).       BMI is within normal parameters. No other follow-up for BMI required.      Physical Exam  Vitals and nursing note reviewed.   Constitutional:       General: She is not in acute distress.     Appearance: She is well-developed.   HENT:      Head: Normocephalic.      Nose: Nose normal.   Cardiovascular:      Rate and Rhythm: Normal rate and regular rhythm.      Heart sounds: Normal heart sounds. No murmur heard.  Pulmonary:      Effort: Pulmonary " effort is normal. No respiratory distress.      Breath sounds: Normal breath sounds.   Musculoskeletal:         General: Normal range of motion.   Skin:     General: Skin is warm and dry.      Findings: No rash.   Neurological:      Mental Status: She is alert and oriented to person, place, and time.   Psychiatric:         Behavior: Behavior normal.         Thought Content: Thought content normal.         Judgment: Judgment normal.        Result Review :  The following data was reviewed by: Su Gabriel MD on 10/19/2023:      COMPREHENSIVE METABOLIC PANEL (09/13/2023 09:54)  COMPREHENSIVE METABOLIC PANEL (07/25/2023 10:47)  CBC AND DIFFERENTIAL (07/25/2023 10:47)           Assessment and Plan   Diagnoses and all orders for this visit:    1. Palpitation (Primary)  -     Holter Monitor - 72 Hour Up To 15 Days; Future  -     Comprehensive Metabolic Panel  -     CBC & Differential  -     TSH Rfx On Abnormal To Free T4    2. MARIAMA (mycobacterium avium-intracellulare)    3. Pure hypercholesterolemia  -     Lipid Panel    4. Hyperglycemia  -     Hemoglobin A1c    5. Bronchiectasis with (acute) exacerbation    6. Osteopenia of left hip  -     Vitamin D,25-Hydroxy      Very pleasant 83-year-old female here to follow-up for palpitations.  It sounds that she had an irregular rhythm that was episodic, potentially A-fib.  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    However she is open to further investigating the official diagnosis.  Plan to do Holter monitor and labs as above.    Likely secondary to medications which prolong the QT high interval.  She was previously on ethambutol, azithromycin and clofazimine for MARIAMA.  I do think there is a high likelihood that the palpitations were drug-induced but will make sure there is no pathologic rhythm or contraindication to consider other meds.  It was  most likely the clofazimine as this was the newest med added this summer.         Follow Up   Return in 6 months (on 4/19/2024), or if symptoms worsen or fail to improve, for Medicare Wellness with fasting labs prior.  Patient was given instructions and counseling regarding her condition or for health maintenance advice. Please see specific information pulled into the AVS if appropriate.     Su Gabriel MD

## 2023-10-20 LAB
25(OH)D3+25(OH)D2 SERPL-MCNC: 37.5 NG/ML (ref 30–100)
ALBUMIN SERPL-MCNC: 4.4 G/DL (ref 3.5–5.2)
ALBUMIN/GLOB SERPL: 2.1 G/DL
ALP SERPL-CCNC: 53 U/L (ref 39–117)
ALT SERPL-CCNC: 17 U/L (ref 1–33)
AST SERPL-CCNC: 21 U/L (ref 1–32)
BASOPHILS # BLD AUTO: 0.05 10*3/MM3 (ref 0–0.2)
BASOPHILS NFR BLD AUTO: 0.9 % (ref 0–1.5)
BILIRUB SERPL-MCNC: 0.4 MG/DL (ref 0–1.2)
BUN SERPL-MCNC: 14 MG/DL (ref 8–23)
BUN/CREAT SERPL: 17.3 (ref 7–25)
CALCIUM SERPL-MCNC: 9.5 MG/DL (ref 8.6–10.5)
CHLORIDE SERPL-SCNC: 100 MMOL/L (ref 98–107)
CHOLEST SERPL-MCNC: 261 MG/DL (ref 0–200)
CO2 SERPL-SCNC: 29.1 MMOL/L (ref 22–29)
CREAT SERPL-MCNC: 0.81 MG/DL (ref 0.57–1)
EGFRCR SERPLBLD CKD-EPI 2021: 71.2 ML/MIN/1.73
EOSINOPHIL # BLD AUTO: 0.19 10*3/MM3 (ref 0–0.4)
EOSINOPHIL NFR BLD AUTO: 3.4 % (ref 0.3–6.2)
ERYTHROCYTE [DISTWIDTH] IN BLOOD BY AUTOMATED COUNT: 12.5 % (ref 12.3–15.4)
GLOBULIN SER CALC-MCNC: 2.1 GM/DL
GLUCOSE SERPL-MCNC: 113 MG/DL (ref 65–99)
HBA1C MFR BLD: 6 % (ref 4.8–5.6)
HCT VFR BLD AUTO: 36.9 % (ref 34–46.6)
HDLC SERPL-MCNC: 85 MG/DL (ref 40–60)
HGB BLD-MCNC: 12.7 G/DL (ref 12–15.9)
IMM GRANULOCYTES # BLD AUTO: 0.02 10*3/MM3 (ref 0–0.05)
IMM GRANULOCYTES NFR BLD AUTO: 0.4 % (ref 0–0.5)
LDLC SERPL CALC-MCNC: 167 MG/DL (ref 0–100)
LYMPHOCYTES # BLD AUTO: 1.44 10*3/MM3 (ref 0.7–3.1)
LYMPHOCYTES NFR BLD AUTO: 25.4 % (ref 19.6–45.3)
MCH RBC QN AUTO: 30.8 PG (ref 26.6–33)
MCHC RBC AUTO-ENTMCNC: 34.4 G/DL (ref 31.5–35.7)
MCV RBC AUTO: 89.6 FL (ref 79–97)
MONOCYTES # BLD AUTO: 0.63 10*3/MM3 (ref 0.1–0.9)
MONOCYTES NFR BLD AUTO: 11.1 % (ref 5–12)
NEUTROPHILS # BLD AUTO: 3.34 10*3/MM3 (ref 1.7–7)
NEUTROPHILS NFR BLD AUTO: 58.8 % (ref 42.7–76)
NRBC BLD AUTO-RTO: 0 /100 WBC (ref 0–0.2)
PLATELET # BLD AUTO: 275 10*3/MM3 (ref 140–450)
POTASSIUM SERPL-SCNC: 4.2 MMOL/L (ref 3.5–5.2)
PROT SERPL-MCNC: 6.5 G/DL (ref 6–8.5)
RBC # BLD AUTO: 4.12 10*6/MM3 (ref 3.77–5.28)
SODIUM SERPL-SCNC: 136 MMOL/L (ref 136–145)
TRIGL SERPL-MCNC: 57 MG/DL (ref 0–150)
TSH SERPL DL<=0.005 MIU/L-ACNC: 1.78 UIU/ML (ref 0.27–4.2)
VLDLC SERPL CALC-MCNC: 9 MG/DL (ref 5–40)
WBC # BLD AUTO: 5.67 10*3/MM3 (ref 3.4–10.8)

## 2023-11-20 ENCOUNTER — HOSPITAL ENCOUNTER (OUTPATIENT)
Dept: CARDIOLOGY | Facility: HOSPITAL | Age: 83
Discharge: HOME | End: 2023-11-20
Admitting: FAMILY MEDICINE
Payer: MEDICARE

## 2023-11-20 DIAGNOSIS — R00.2 PALPITATION: ICD-10-CM

## 2023-11-20 PROCEDURE — 93242 EXT ECG>48HR<7D RECORDING: CPT

## 2023-12-11 ENCOUNTER — OFFICE VISIT (OUTPATIENT)
Dept: FAMILY MEDICINE CLINIC | Facility: CLINIC | Age: 83
End: 2023-12-11
Payer: MEDICARE

## 2023-12-11 VITALS
HEIGHT: 62 IN | WEIGHT: 120 LBS | TEMPERATURE: 98.8 F | DIASTOLIC BLOOD PRESSURE: 82 MMHG | OXYGEN SATURATION: 98 % | HEART RATE: 85 BPM | SYSTOLIC BLOOD PRESSURE: 126 MMHG | BODY MASS INDEX: 22.08 KG/M2

## 2023-12-11 DIAGNOSIS — R00.0 TACHYCARDIA: ICD-10-CM

## 2023-12-11 DIAGNOSIS — R06.02 SHORTNESS OF BREATH: Primary | ICD-10-CM

## 2023-12-11 DIAGNOSIS — Z51.81 THERAPEUTIC DRUG MONITORING: ICD-10-CM

## 2023-12-11 NOTE — PROGRESS NOTES
"Chief Complaint  Rapid Heart Rate (Pt reports tachycardia, fluttering, SOB, and resting HR of 150 on Friday. Worsening SOB since Saturday. Right shoulder pain yesterday that radiated to R chest, and continued chest tenderness. Recent holter monitor. Pt is concerned it is a reaction to clofazimine, ethambutol,  rifampin, and azithromycin. Stopped azithromycin Saturday. Currently taking ethambutol (started last Monday) rifampin (started today). Has not taken clofazimine since August, but pt reports tachycardia in August. )    Subjective        Jenniffer Dumont presents to [facility]  History of Present Illness  Jenniffer Dumont is a 83 y.o. female who presents to the clinic today with concerns of a rapid heart rate. Patient was last seen by PCP 10/19/2023 for similar complaints.  Patient had been on multiple medications for treatment of MARIAMA by her pulmonologist at the [facility].  Patient's QTc interval was being monitored by her PCP, Dr. Gabrile, for prolonged QTc interval.  Patient had a Holter monitor ordered.  The results returned on 12/5/2023.  Overall it showed a relatively benign monitor study.  2.5% PAC burden and numerous episodes of SVT.  The longer lasting 14 seconds.  Patient was reportedly asymptomatic.  She also had labs performed at her last appointment.  Labs were overall stable.    She recently started to have shortness of breath that started 2 days ago. She also developed right sided chest pain and chest tenderness yesterday. She was restarted on her azithromycin and ethambutol. She stopped azithromycin when her symptoms started. She has had issues like this in the past with these medications. If she walks across the room she gets short of breath and heart rate increases and is irregular. She also notices \"pauses\" in her heart rhythm. She denies leg swelling or orthopnea.     Review of Systems   Respiratory:  Positive for chest tightness and shortness of breath.  " "  Cardiovascular:  Positive for chest pain and palpitations.       Objective   Vital Signs:  /82   Pulse 85   Temp 98.8 °F (37.1 °C)   Ht 157.5 cm (62.01\")   Wt 54.4 kg (120 lb)   SpO2 98%   BMI 21.94 kg/m²   Estimated body mass index is 21.94 kg/m² as calculated from the following:    Height as of this encounter: 157.5 cm (62.01\").    Weight as of this encounter: 54.4 kg (120 lb).       BMI is within normal parameters. No other follow-up for BMI required.      Physical Exam  Vitals reviewed.   Constitutional:       Appearance: Normal appearance.   HENT:      Head: Normocephalic and atraumatic.   Eyes:      General: No scleral icterus.        Right eye: No discharge.         Left eye: No discharge.   Cardiovascular:      Rate and Rhythm: Normal rate and regular rhythm.   Pulmonary:      Effort: Pulmonary effort is normal. No respiratory distress.   Neurological:      General: No focal deficit present.      Mental Status: She is alert and oriented to person, place, and time.      Motor: No weakness.      Gait: Gait normal.   Psychiatric:         Mood and Affect: Mood normal.         Behavior: Behavior normal.        Result Review :        Holter Monitor - 72 Hour Up To 15 Days (11/20/2023 10:05)       ECG 12 Lead    Date/Time: 12/13/2023 5:19 PM  Performed by: Julissa Colon APRN    Authorized by: Julissa Colon APRN  Comparison: compared with previous ECG from 11/18/2022  Rhythm: sinus rhythm  Rate: normal  Conduction: conduction normal  QRS axis: normal            Assessment and Plan   Diagnoses and all orders for this visit:    1. Shortness of breath (Primary)  -     ECG 12 Lead  -     CBC w AUTO Differential  -     Comprehensive metabolic panel    2. Tachycardia  -     ECG 12 Lead  -     CBC w AUTO Differential  -     Comprehensive metabolic panel    3. Therapeutic drug monitoring  -     CBC w AUTO Differential  -     Comprehensive metabolic panel      Patient has had these symptoms in the past " related to her antibiotics for MARIAMA treatment. EKG does not indicate changes in QT interval. I recommended she follow-up with her infection disease doctor to discuss making changes in her antibiotics. Lab work ordered as well. This was also discussed with her PCP. Patient to seek emergency care if symptoms worsen.          Follow Up   Return if symptoms worsen or fail to improve.  Patient was given instructions and counseling regarding her condition or for health maintenance advice. Please see specific information pulled into the AVS if appropriate.     Electronically signed by DANIE White, 12/13/23, 5:21 PM EST.

## 2023-12-12 LAB
ALBUMIN SERPL-MCNC: 4.2 G/DL (ref 3.5–5.2)
ALBUMIN/GLOB SERPL: 1.7 G/DL
ALP SERPL-CCNC: 57 U/L (ref 39–117)
ALT SERPL-CCNC: 13 U/L (ref 1–33)
AST SERPL-CCNC: 18 U/L (ref 1–32)
BASOPHILS # BLD AUTO: 0.08 10*3/MM3 (ref 0–0.2)
BASOPHILS NFR BLD AUTO: 1.2 % (ref 0–1.5)
BILIRUB SERPL-MCNC: 0.8 MG/DL (ref 0–1.2)
BUN SERPL-MCNC: 13 MG/DL (ref 8–23)
BUN/CREAT SERPL: 17.3 (ref 7–25)
CALCIUM SERPL-MCNC: 9.3 MG/DL (ref 8.6–10.5)
CHLORIDE SERPL-SCNC: 99 MMOL/L (ref 98–107)
CO2 SERPL-SCNC: 25.6 MMOL/L (ref 22–29)
CREAT SERPL-MCNC: 0.75 MG/DL (ref 0.57–1)
EGFRCR SERPLBLD CKD-EPI 2021: 78.1 ML/MIN/1.73
EOSINOPHIL # BLD AUTO: 0.31 10*3/MM3 (ref 0–0.4)
EOSINOPHIL NFR BLD AUTO: 4.6 % (ref 0.3–6.2)
ERYTHROCYTE [DISTWIDTH] IN BLOOD BY AUTOMATED COUNT: 12.2 % (ref 12.3–15.4)
GLOBULIN SER CALC-MCNC: 2.5 GM/DL
GLUCOSE SERPL-MCNC: 82 MG/DL (ref 65–99)
HCT VFR BLD AUTO: 37.3 % (ref 34–46.6)
HGB BLD-MCNC: 12.5 G/DL (ref 12–15.9)
IMM GRANULOCYTES # BLD AUTO: 0.01 10*3/MM3 (ref 0–0.05)
IMM GRANULOCYTES NFR BLD AUTO: 0.1 % (ref 0–0.5)
LYMPHOCYTES # BLD AUTO: 1.46 10*3/MM3 (ref 0.7–3.1)
LYMPHOCYTES NFR BLD AUTO: 21.5 % (ref 19.6–45.3)
MCH RBC QN AUTO: 30.1 PG (ref 26.6–33)
MCHC RBC AUTO-ENTMCNC: 33.5 G/DL (ref 31.5–35.7)
MCV RBC AUTO: 89.9 FL (ref 79–97)
MONOCYTES # BLD AUTO: 0.6 10*3/MM3 (ref 0.1–0.9)
MONOCYTES NFR BLD AUTO: 8.8 % (ref 5–12)
NEUTROPHILS # BLD AUTO: 4.33 10*3/MM3 (ref 1.7–7)
NEUTROPHILS NFR BLD AUTO: 63.8 % (ref 42.7–76)
NRBC BLD AUTO-RTO: 0 /100 WBC (ref 0–0.2)
PLATELET # BLD AUTO: 313 10*3/MM3 (ref 140–450)
POTASSIUM SERPL-SCNC: 4.6 MMOL/L (ref 3.5–5.2)
PROT SERPL-MCNC: 6.7 G/DL (ref 6–8.5)
RBC # BLD AUTO: 4.15 10*6/MM3 (ref 3.77–5.28)
SODIUM SERPL-SCNC: 136 MMOL/L (ref 136–145)
WBC # BLD AUTO: 6.79 10*3/MM3 (ref 3.4–10.8)

## 2024-03-01 ENCOUNTER — TELEPHONE (OUTPATIENT)
Dept: FAMILY MEDICINE CLINIC | Facility: CLINIC | Age: 84
End: 2024-03-01
Payer: MEDICARE

## 2024-03-01 DIAGNOSIS — A31.0 MAI (MYCOBACTERIUM AVIUM-INTRACELLULARE): ICD-10-CM

## 2024-03-01 DIAGNOSIS — J47.1 BRONCHIECTASIS WITH (ACUTE) EXACERBATION: Primary | ICD-10-CM

## 2024-03-01 NOTE — TELEPHONE ENCOUNTER
Caller: Jenniffer Dumont    Relationship: Self    Best call back number: [phone]       What was the call regarding: PATIENT HAD AN EPISODE OF COUGHING UP BLOOD A WEEK AGO. HER PULMONOLOGIST RECOMMENDS SHE CALL PCP TO HAVE ORDER FOR SPUTUM CULTURE AND CT SCAN.    SHE WOULD LIKE TO  THE CONTAINER AT THE OFFICE FOR THE SPUTUM CULTURE.    PLEASE ADVISE

## 2024-03-01 NOTE — TELEPHONE ENCOUNTER
Sure, that is not a problem, did she need me to place the order for the culture she does needs the container to mail to [facility]?

## 2024-03-04 NOTE — TELEPHONE ENCOUNTER
She has to go to [facility]  for the External lab order for  sputum culture ,we fax the results to [facility] we have done it before that way .

## 2024-03-04 NOTE — TELEPHONE ENCOUNTER
Not a problem, I sent both the sputum and the AFB culture to [facility] lab.  Thank you

## 2024-03-04 NOTE — TELEPHONE ENCOUNTER
Sure,    Placed orders for the CT scan.  Ideally would be helpful to compare the CT scan with the same location she had it done before.  But if it is logistically easier for her to have it done here that is not a problem.

## 2024-03-27 ENCOUNTER — HOSPITAL ENCOUNTER (OUTPATIENT)
Dept: CT IMAGING | Facility: HOSPITAL | Age: 84
Discharge: HOME | End: 2024-03-27
Admitting: FAMILY MEDICINE
Payer: MEDICARE

## 2024-03-27 DIAGNOSIS — J47.1 BRONCHIECTASIS WITH (ACUTE) EXACERBATION: ICD-10-CM

## 2024-03-27 DIAGNOSIS — A31.0 MAI (MYCOBACTERIUM AVIUM-INTRACELLULARE): ICD-10-CM

## 2024-03-27 PROCEDURE — 71250 CT THORAX DX C-: CPT

## 2024-04-19 ENCOUNTER — OFFICE VISIT (OUTPATIENT)
Dept: FAMILY MEDICINE CLINIC | Facility: CLINIC | Age: 84
End: 2024-04-19
Payer: MEDICARE

## 2024-04-19 VITALS
DIASTOLIC BLOOD PRESSURE: 70 MMHG | TEMPERATURE: 97.8 F | SYSTOLIC BLOOD PRESSURE: 118 MMHG | HEART RATE: 70 BPM | BODY MASS INDEX: 22.82 KG/M2 | OXYGEN SATURATION: 98 % | WEIGHT: 124 LBS | HEIGHT: 62 IN

## 2024-04-19 DIAGNOSIS — Z00.00 MEDICARE ANNUAL WELLNESS VISIT, SUBSEQUENT: Primary | ICD-10-CM

## 2024-04-19 DIAGNOSIS — E78.00 PURE HYPERCHOLESTEROLEMIA: ICD-10-CM

## 2024-04-19 DIAGNOSIS — M85.852 OSTEOPENIA OF LEFT HIP: ICD-10-CM

## 2024-04-19 DIAGNOSIS — J47.1 BRONCHIECTASIS WITH (ACUTE) EXACERBATION: ICD-10-CM

## 2024-04-19 DIAGNOSIS — A31.0 MAI (MYCOBACTERIUM AVIUM-INTRACELLULARE): ICD-10-CM

## 2024-04-19 DIAGNOSIS — R73.9 HYPERGLYCEMIA: ICD-10-CM

## 2024-04-19 NOTE — PROGRESS NOTES
The ABCs of the Annual Wellness Visit  Subsequent Medicare Wellness Visit    Subjective    Jenniffer Dumont is a 83 y.o. female who presents for a Subsequent Medicare Wellness Visit.    The following portions of the patient's history were reviewed and   updated as appropriate: allergies, current medications, past family history, past medical history, past social history, past surgical history, and problem list.    Compared to one year ago, the patient feels her physical   health is worse.    Compared to one year ago, the patient feels her mental   health is worse.    Recent Hospitalizations:  She was admitted within the past 365 days at [facility].       Current Medical Providers:  Patient Care Team:  Su Gabriel MD as PCP - General (Family Medicine)    Outpatient Medications Prior to Visit   Medication Sig Dispense Refill    Advair Diskus 100-50 MCG/ACT DISKUS Inhale 1 puff 2 (Two) Times a Day.      albuterol (PROVENTIL) (2.5 MG/3ML) 0.083% nebulizer solution Take 2.5 mg by nebulization Every 6 (Six) Hours As Needed for Wheezing or Shortness of Air. 100 vial 11    Ascorbic Acid (VITAMIN C) 500 MG capsule Take  by mouth. Three times weekly      Symbicort 80-4.5 MCG/ACT inhaler INHALE 2 PUFFS BY MOUTH DAILY 10.2 g 6    Calcium Carbonate-Vitamin D (CALCIUM 500 + D PO) Take 2,000 Units by mouth Daily. (Patient not taking: Reported on 4/19/2024)      Coenzyme Q10 (COQ-10 PO) Take by mouth. (Patient not taking: Reported on 4/19/2024)      esomeprazole (nexIUM) 40 MG capsule Take 1 capsule by mouth Every Morning Before Breakfast. (Patient not taking: Reported on 4/19/2024)      fexofenadine (ALLEGRA) 180 MG tablet Take  by mouth Daily. (Patient not taking: Reported on 4/19/2024)      fluticasone (FLONASE) 50 MCG/ACT nasal spray into each nostril. (Patient not taking: Reported on 4/19/2024)      MAGNESIUM PO Take  by mouth.      Multiple Vitamins-Minerals (MULTIVITAMIN WITH MINERALS) tablet Take 1 tablet  "by mouth Daily.      azithromycin (ZITHROMAX) 250 MG tablet Take 1 tablet by mouth Daily. (Patient not taking: Reported on 4/19/2024)      ethambutol (MYAMBUTOL) 400 MG tablet Take 1.5 tablets by mouth Daily. (Patient not taking: Reported on 4/19/2024)      rifAMPin (RIFADIN) 300 MG capsule        No facility-administered medications prior to visit.       No opioid medication identified on active medication list. I have reviewed chart for other potential  high risk medication/s and harmful drug interactions in the elderly.        Aspirin is not on active medication list.  Aspirin use is not indicated based on review of current medical condition/s. Risk of harm outweighs potential benefits.  .    Patient Active Problem List   Diagnosis    Bronchiectasis with (acute) exacerbation    Cough    HLD (hyperlipidemia)    MARIAMA (mycobacterium avium-intracellulare)    Allergic sinusitis    History of melanoma    Gastroesophageal reflux disease    Hyperglycemia    Pneumonia due to infectious organism    Acute hyponatremia    Chronic pain of left knee    Osteopenia of left hip     Advance Care Planning   Advance Care Planning     Advance Directive is not on file.  ACP discussion was held with the patient during this visit. Patient has an advance directive (not in EMR), copy requested.     Objective    Vitals:    04/19/24 1232   BP: 118/70   Pulse: 70   Temp: 97.8 °F (36.6 °C)   SpO2: 98%   Weight: 56.2 kg (124 lb)   Height: 157.5 cm (62\")   PainSc: 0-No pain     Estimated body mass index is 22.68 kg/m² as calculated from the following:    Height as of this encounter: 157.5 cm (62\").    Weight as of this encounter: 56.2 kg (124 lb).    BMI is within normal parameters. No other follow-up for BMI required.      Does the patient have evidence of cognitive impairment? No          HEALTH RISK ASSESSMENT    Smoking Status:  Social History     Tobacco Use   Smoking Status Former    Current packs/day: 0.00    Average packs/day: 1 pack/day " for 30.0 years (30.0 ttl pk-yrs)    Types: Cigarettes    Quit date:     Years since quittin.3   Smokeless Tobacco Never     Alcohol Consumption:  Social History     Substance and Sexual Activity   Alcohol Use Yes    Alcohol/week: 2.0 standard drinks of alcohol    Types: 2 Glasses of wine per week    Comment: Social use     Fall Risk Screen:    YUSRADARION Fall Risk Assessment was completed, and patient is at LOW risk for falls.Assessment completed on:2024    Depression Screenin/19/2024    12:37 PM   PHQ-2/PHQ-9 Depression Screening   Little Interest or Pleasure in Doing Things 0-->not at all   Feeling Down, Depressed or Hopeless 0-->not at all   PHQ-9: Brief Depression Severity Measure Score 0       Health Habits and Functional and Cognitive Screenin/19/2024    12:34 PM   Functional & Cognitive Status   Do you have difficulty preparing food and eating? No   Do you have difficulty bathing yourself, getting dressed or grooming yourself? No   Do you have difficulty using the toilet? No   Do you have difficulty moving around from place to place? Yes   Do you have trouble with steps or getting out of a bed or a chair? No   Current Diet Well Balanced Diet   Eye Exam Not up to date   Current Exercises Include Walking   Do you need help using the phone?  No   Are you deaf or do you have serious difficulty hearing?  Yes   Do you need help to go to places out of walking distance? No   Do you need help shopping? No   Do you need help with housework?  Yes   Do you need help with laundry? No   Do you need help taking your medications? No   Do you need help managing money? No   Do you ever drive or ride in a car without wearing a seat belt? No   Have you felt unusual stress, anger or loneliness in the last month? Yes   Who do you live with? Spouse   Have you been bothered in the last four weeks by sexual problems? No   Do you have difficulty concentrating, remembering or making decisions? No        Age-appropriate Screening Schedule:  Refer to the list below for future screening recommendations based on patient's age, sex and/or medical conditions. Orders for these recommended tests are listed in the plan section. The patient has been provided with a written plan.    Health Maintenance   Topic Date Due    TDAP/TD VACCINES (1 - Tdap) Never done    ZOSTER VACCINE (1 of 2) Never done    RSV Vaccine - Adults (1 - 1-dose 60+ series) Never done    DXA SCAN  10/14/2016    INFLUENZA VACCINE  08/01/2024    LIPID PANEL  10/19/2024    ANNUAL WELLNESS VISIT  04/19/2025    COVID-19 Vaccine  Completed    Pneumococcal Vaccine 65+  Completed                  CMS Preventative Services Quick Reference  Risk Factors Identified During Encounter  Inadequate Social Support, Isolation, Loneliness, Lack of Transportation, Financial Difficulties, or Caregiver Stress: care giver stress with taking care of her    Dental Screening Recommended  Vision Screening Recommended  The above risks/problems have been discussed with the patient.  Pertinent information has been shared with the patient in the After Visit Summary.  An After Visit Summary and PPPS were made available to the patient.    Follow Up:   Next Medicare Wellness visit to be scheduled in 1 year.       Additional E&M Note during same encounter follows:  Patient has multiple medical problems which are significant and separately identifiable that require additional work above and beyond the Medicare Wellness Visit.      Chief Complaint  Medicare Wellness-subsequent (Feeling a little light headed today )    Subjective        HPI  Jenniffer Dumont is also being seen today for palpitations and tachycardia and CAP.     She has been home for about 12 days since being hospitalized from bronchiectasis.  She has done well,.     Palpitations last night, her heart rate went up to 180 and lasted for 15-20 min and she did feel short of breath.  She has had this in the past but she  "is off all antibiotics at this time.  She slept in longer and she is more tired today and light headed, she had a michael coffee at 8 AM and the the attack was at 7:30 PM.  She is not taking thee azithromycin, ethambutol, doxy or prednisone.      Se has stress taking care of her         Objective   Vital Signs:  /70   Pulse 70   Temp 97.8 °F (36.6 °C)   Ht 157.5 cm (62\")   Wt 56.2 kg (124 lb)   SpO2 98%   BMI 22.68 kg/m²     Physical Exam  Vitals and nursing note reviewed.   Constitutional:       General: She is not in acute distress.     Appearance: She is well-developed.   HENT:      Head: Normocephalic.      Nose: Nose normal.   Cardiovascular:      Rate and Rhythm: Normal rate and regular rhythm.      Heart sounds: Normal heart sounds. No murmur heard.  Pulmonary:      Effort: Pulmonary effort is normal. No respiratory distress.      Breath sounds: Normal breath sounds.   Musculoskeletal:         General: Normal range of motion.   Skin:     General: Skin is warm and dry.      Findings: No rash.   Neurological:      Mental Status: She is alert and oriented to person, place, and time.   Psychiatric:         Behavior: Behavior normal.         Thought Content: Thought content normal.         Judgment: Judgment normal.          The following data was reviewed by: Su Gabriel MD on 04/19/2024:        BASIC METABOLIC PANEL (04/06/2024 06:11)  CBC (NO DIFF) (04/06/2024 06:11)  PROBNP (REFERENCE) (04/03/2024 15:07)     ECG 12 Lead    Date/Time: 4/19/2024 5:15 PM  Performed by: Su Gabreil MD    Authorized by: Su Gabriel MD  Comparison: compared with previous ECG from 12/12/2023  Similar to previous ECG  Rhythm: sinus rhythm  Rate: normal  Conduction: conduction normal  ST Segments: ST segments normal  T Waves: T waves normal  QRS axis: normal  Other: no other findings    Clinical impression: normal ECG              Assessment and Plan   Diagnoses and all orders for this visit:    1. " Medicare annual wellness visit, subsequent (Primary)    2. MARIAMA (mycobacterium avium-intracellulare)    3. Bronchiectasis with (acute) exacerbation    4. Hyperglycemia  -     Hemoglobin A1c    5. Osteopenia of left hip  -     Vitamin D,25-Hydroxy    6. Pure hypercholesterolemia  -     Lipid Panel  -     TSH Rfx On Abnormal To Free T4    Pleasant 83-year-old female here for Medicare wellness visit, overall up-to-date okay to consider Tdap at the pharmacy.  Reviewed recent labs that her hospitalization, due for labs above today.    Unfortunately has not she had an episode of tachycardia with possible SVT especially as that showed up on her recent Holter monitor, EKG today is reassuring, discussed using the Valsalva maneuver if another episode occurs.    She is also seeing her pulmonologist soon for bronchiectasis with MARIAMA, now off all antibiotics but still doing percussion therapy, albuterol Symbicort (and Advair as needed), she feels that her breathing is back to baseline doing well with her, continue with seeing Dr. Fisher at the [facility]         Follow Up   Return in 6 months (on 10/19/2024), or if symptoms worsen or fail to improve, for Recheck hyperglycemia and hyperlipidemia.  Patient was given instructions and counseling regarding her condition or for health maintenance advice. Please see specific information pulled into the AVS if appropriate.

## 2024-04-20 LAB
25(OH)D3+25(OH)D2 SERPL-MCNC: 36.7 NG/ML (ref 30–100)
CHOLEST SERPL-MCNC: 259 MG/DL (ref 0–200)
HBA1C MFR BLD: 6 % (ref 4.8–5.6)
HDLC SERPL-MCNC: 87 MG/DL (ref 40–60)
LDLC SERPL CALC-MCNC: 159 MG/DL (ref 0–100)
TRIGL SERPL-MCNC: 79 MG/DL (ref 0–150)
TSH SERPL DL<=0.005 MIU/L-ACNC: 1.82 UIU/ML (ref 0.27–4.2)
VLDLC SERPL CALC-MCNC: 13 MG/DL (ref 5–40)

## 2024-04-22 ENCOUNTER — PRE-ADMISSION TESTING (OUTPATIENT)
Dept: PREADMISSION TESTING | Facility: HOSPITAL | Age: 84
End: 2024-04-22
Payer: MEDICARE

## 2024-04-22 ENCOUNTER — HOSPITAL ENCOUNTER (OUTPATIENT)
Dept: GENERAL RADIOLOGY | Facility: HOSPITAL | Age: 84
Discharge: HOME | End: 2024-04-22
Payer: MEDICARE

## 2024-04-22 VITALS
HEART RATE: 84 BPM | TEMPERATURE: 97.3 F | RESPIRATION RATE: 20 BRPM | SYSTOLIC BLOOD PRESSURE: 128 MMHG | HEIGHT: 61 IN | WEIGHT: 128 LBS | DIASTOLIC BLOOD PRESSURE: 72 MMHG | BODY MASS INDEX: 24.17 KG/M2 | OXYGEN SATURATION: 98 %

## 2024-04-22 LAB
ALBUMIN SERPL-MCNC: 4.1 G/DL (ref 3.5–5.2)
ALBUMIN/GLOB SERPL: 1.9 G/DL
ALP SERPL-CCNC: 48 U/L (ref 39–117)
ALT SERPL W P-5'-P-CCNC: 17 U/L (ref 1–33)
ANION GAP SERPL CALCULATED.3IONS-SCNC: 8 MMOL/L (ref 5–15)
AST SERPL-CCNC: 16 U/L (ref 1–32)
BILIRUB SERPL-MCNC: 0.4 MG/DL (ref 0–1.2)
BUN SERPL-MCNC: 14 MG/DL (ref 8–23)
BUN/CREAT SERPL: 15.6 (ref 7–25)
CALCIUM SPEC-SCNC: 9.3 MG/DL (ref 8.6–10.5)
CHLORIDE SERPL-SCNC: 102 MMOL/L (ref 98–107)
CO2 SERPL-SCNC: 29 MMOL/L (ref 22–29)
CREAT SERPL-MCNC: 0.9 MG/DL (ref 0.57–1)
DEPRECATED RDW RBC AUTO: 41.9 FL (ref 37–54)
EGFRCR SERPLBLD CKD-EPI 2021: 62.8 ML/MIN/1.73
ERYTHROCYTE [DISTWIDTH] IN BLOOD BY AUTOMATED COUNT: 12.9 % (ref 12.3–15.4)
GLOBULIN UR ELPH-MCNC: 2.2 GM/DL
GLUCOSE SERPL-MCNC: 104 MG/DL (ref 65–99)
HCT VFR BLD AUTO: 34.1 % (ref 34–46.6)
HGB BLD-MCNC: 11.2 G/DL (ref 12–15.9)
INR PPP: 0.98 (ref 0.9–1.1)
MCH RBC QN AUTO: 28.9 PG (ref 26.6–33)
MCHC RBC AUTO-ENTMCNC: 32.8 G/DL (ref 31.5–35.7)
MCV RBC AUTO: 88.1 FL (ref 79–97)
PLATELET # BLD AUTO: 257 10*3/MM3 (ref 140–450)
PMV BLD AUTO: 8.9 FL (ref 6–12)
POTASSIUM SERPL-SCNC: 4.2 MMOL/L (ref 3.5–5.2)
PROT SERPL-MCNC: 6.3 G/DL (ref 6–8.5)
PROTHROMBIN TIME: 13.2 SECONDS (ref 11.7–14.2)
RBC # BLD AUTO: 3.87 10*6/MM3 (ref 3.77–5.28)
SODIUM SERPL-SCNC: 139 MMOL/L (ref 136–145)
WBC NRBC COR # BLD AUTO: 6.63 10*3/MM3 (ref 3.4–10.8)

## 2024-04-22 PROCEDURE — 73560 X-RAY EXAM OF KNEE 1 OR 2: CPT

## 2024-04-22 PROCEDURE — 85610 PROTHROMBIN TIME: CPT

## 2024-04-22 PROCEDURE — 85027 COMPLETE CBC AUTOMATED: CPT

## 2024-04-22 PROCEDURE — 36415 COLL VENOUS BLD VENIPUNCTURE: CPT

## 2024-04-22 PROCEDURE — 80053 COMPREHEN METABOLIC PANEL: CPT

## 2024-04-22 RX ORDER — ALBUTEROL SULFATE 90 UG/1
2 AEROSOL, METERED RESPIRATORY (INHALATION) EVERY 4 HOURS PRN
COMMUNITY

## 2024-04-22 RX ORDER — SENNOSIDES 8.6 MG
650 CAPSULE ORAL EVERY 8 HOURS PRN
COMMUNITY

## 2024-04-22 NOTE — DISCHARGE INSTRUCTIONS
Take the following medications the morning of surgery:    Nebulizer treatments   symbicort inhaler   flonase nasal spray   allegra   nexium    If you are on prescription narcotic pain medication to control your pain you may also take that medication the morning of surgery.    General Instructions:  Do not eat solid food after midnight the night before surgery.  You may drink clear liquids day of surgery but must stop at least one hour before your hospital arrival time.  It is beneficial for you to have a clear drink that contains carbohydrates the day of surgery.  We suggest a 12 to 20 ounce bottle of Gatorade or Powerade for non-diabetic patients or a 12 to 20 ounce bottle of G2 or Powerade Zero for diabetic patients. (Pediatric patients, are not advised to drink a 12 to 20 ounce carbohydrate drink)    Clear liquids are liquids you can see through.  Nothing red in color.     Plain water                               Sports drinks  Sodas                                   Gelatin (Jell-O)  Fruit juices without pulp such as white grape juice and apple juice  Popsicles that contain no fruit or yogurt  Tea or coffee (no cream or milk added)  Gatorade / Powerade  G2 / Powerade Zero    Infants may have breast milk up to four hours before surgery.  Infants drinking formula may drink formula up to six hours before surgery.   Patients who avoid smoking, chewing tobacco and alcohol for 4 weeks prior to surgery have a reduced risk of post-operative complications.  Quit smoking as many days before surgery as you can.  Do not smoke, use chewing tobacco or drink alcohol the day of surgery.   If applicable bring your C-PAP/ BI-PAP machine in with you to preop day of surgery.  Bring any papers given to you in the doctor’s office.  Wear clean comfortable clothes.  Do not wear contact lenses, false eyelashes or make-up.  Bring a case for your glasses.   Bring crutches or walker if applicable.  Remove all piercings.  Leave jewelry and  any other valuables at home.  Hair extensions with metal clips must be removed prior to surgery.  The Pre-Admission Testing nurse will instruct you to bring medications if unable to obtain an accurate list in Pre-Admission Testing.        If you were given a blood bank ID arm band remember to bring it with you the day of surgery.    Preventing a Surgical Site Infection:  For 2 to 3 days before surgery, avoid shaving with a razor because the razor can irritate skin and make it easier to develop an infection.    Any areas of open skin can increase the risk of a post-operative wound infection by allowing bacteria to enter and travel throughout the body.  Notify your surgeon if you have any skin wounds / rashes even if it is not near the expected surgical site.  The area will need assessed to determine if surgery should be delayed until it is healed.  The night prior to surgery shower using a fresh bar of anti-bacterial soap (such as Dial) and clean washcloth.  Sleep in a clean bed with clean clothing.  Do not allow pets to sleep with you.  Shower on the morning of surgery using a fresh bar of anti-bacterial soap (such as Dial) and clean washcloth.  Dry with a clean towel and dress in clean clothing.  Ask your surgeon if you will be receiving antibiotics prior to surgery.  Make sure you, your family, and all healthcare providers clean their hands with soap and water or an alcohol based hand  before caring for you or your wound.    Day of surgery:5/8/2024   0730  Your arrival time is approximately two hours before your scheduled surgery time.  Upon arrival, a Pre-op nurse and Anesthesiologist will review your health history, obtain vital signs, and answer questions you may have.  The only belongings needed at this time will be a list of your home medications and if applicable your C-PAP/BI-PAP machine.  A Pre-op nurse will start an IV and you may receive medication in preparation for surgery, including something  to help you relax.     Please be aware that surgery does come with discomfort.  We want to make every effort to control your discomfort so please discuss any uncontrolled symptoms with your nurse.   Your doctor will most likely have prescribed pain medications.      If you are going home after surgery you will receive individualized written care instructions before being discharged.  A responsible adult must drive you to and from the hospital on the day of your surgery and ideally stay with you through the night.   .  Discharge prescriptions can be filled by the hospital pharmacy during regular pharmacy hours.  If you are having surgery late in the day/evening your prescription may be e-prescribed to your pharmacy.  Please verify your pharmacy hours or chose a 24 hour pharmacy to avoid not having access to your prescription because your pharmacy has closed for the day.    If you are staying overnight following surgery, you will be transported to your hospital room following the recovery period.  [facility] has all private rooms.    If you have any questions please call Pre-Admission Testing at [phone].  Deductibles and co-payments are collected on the day of service. Please be prepared to pay the required co-pay, deductible or deposit on the day of service as defined by your plan.    Call your surgeon immediately if you experience any of the following symptoms:  Sore Throat  Shortness of Breath or difficulty breathing  Cough  Chills  Body soreness or muscle pain  Headache  Fever  New loss of taste or smell  Do not arrive for your surgery ill.  Your procedure will need to be rescheduled to another time.  You will need to call your physician before the day of surgery to avoid any unnecessary exposure to hospital staff as well as other patients.  CHLORHEXIDINE CLOTH INSTRUCTIONS  The morning of surgery follow these instructions using the Chlorhexidine cloths you've been given.  These steps reduce  bacteria on the body.  Do not use the cloths near your eyes, ears mouth, genitalia or on open wounds.  Throw the cloths away after use but do not try to flush them down a toilet.      Open and remove one cloth at a time from the package.    Leave the cloth unfolded and begin the bathing.  Massage the skin with the cloths using gentle pressure to remove bacteria.  Do not scrub harshly.   Follow the steps below with one 2% CHG cloth per area (6 total cloths).  One cloth for neck, shoulders and chest.  One cloth for both arms, hands, fingers and underarms (do underarms last).  One cloth for the abdomen followed by groin.  One cloth for right leg and foot including between the toes.  One cloth for left leg and foot including between the toes.  The last cloth is to be used for the back of the neck, back and buttocks.    Allow the CHG to air dry 3 minutes on the skin which will give it time to work and decrease the chance of irritation.  The skin may feel sticky until it is dry.  Do not rinse with water or any other liquid or you will lose the beneficial effects of the CHG.  If mild skin irritation occurs, do rinse the skin to remove the CHG.  Report this to the nurse at time of admission.  Do not apply lotions, creams, ointments, deodorants or perfumes after using the clothes. Dress in clean clothes before coming to the hospital.

## 2024-10-01 NOTE — H&P (VIEW-ONLY)
"    Mitchell Arevalo MD  [facility] Adult Reconstruction   General Orthopaedics    [phone] [address] [facility], [id number] [facility], [address]    10/1/2024      Subjective:      Patient ID: Jenniffer Dumont is a 84 y.o. patient that has a chief complaint of   Chief Complaint   Patient presents with   • Left Knee - Follow-up     Patient MRN: [id number]    HPI for follow up visit:  The patient presents today for a follow up evaluation of left knee pain.  She has severe osteoarthritis of her left knee.  She has tried bracing she has had extensive corticosteroid injections.  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  She follows with pulmonology and infectious disease at [facility].  She has extensively discussed the possibility of total knee arthroplasty as she is now miserable and cannot bear significant weight her mobility is significantly impaired.  She is now on MAC treatment.  This is triple therapy.  She receives these medications 3 times weekly.  She is not aware of any anticipated stop date but states that she is overall feeling much better from a pulmonary standpoint.  I have been following her for about 3 years for her left knee.    Relevant point review of (Review of Systems Form, Injury Forms, and/or History Forms) dated 10/1/2024 (or most recent visit to [facility]) was reviewed and all pertinent positives were reviewed and are available in the patient's chart    Vitals:    10/01/24 0958   Weight: 124 lb (56.2 kg)   Height: 5' 1\" (1.549 m)       Past Surgical History:   Procedure Laterality Date   • CATARACT REMOVAL     • HYSTERECTOMY     • TONSILLECTOMY     • WRIST FRACTURE SURGERY Left          Objective:     General:  Well-appearing with appropriate affect.  No acute distress. Appears stated age.  Head:  Normocephalic, atraumatic.  Extraocular muscles intact   Neuro:  Alert and oriented   Pulmonary:  " Respirations are unlabored  Psychiatric:  Mood is appropriate for circumstances.      Body mass index is 23.43 kg/m².      Musculoskeletal Exam:    Ortho Exam     The left knee is evaluated she has tenderness at the medial joint line there is a mild effusion guarding with range of motion she tolerates range of motion 5 degrees - 125 degrees.  No tenderness with ankle plantarflexion dorsiflexion.    Imaging:     Xrays: Location, views, Summary: 3 views of the left knee demonstrate complete loss of the medial joint space.  Osteopenia is appreciated.  There is a genu varum alignment.  Subchondral sclerosis and periarticular osteophyte seen        Assessment:       Assessment    Jenniffer was seen today for follow-up.    Diagnoses and all orders for this visit:    S/P total knee arthroplasty, left  -     XR KNEE LEFT AP LATERAL AND SUNRISE STANDING; Future  -     AMB OC YUSEF SURGERY COMMUNICATION ORDER         THE PLAN IS OUTLINED BELOW:      The patient has had extensive conservative treatment.  She is now quite miserable.  She is interested in operative intervention.  She has seen her pulmonologist and infectious disease doctor at [facility].  All parties agree she would certainly be high risk for operative intervention but I do not think it is prohibitive of knee replacement surgery.  Would also plan on spinal anesthesia to minimize pulmonary complications.  I advised her that she would be slightly higher risk of complications such as infection as well as possible cardiopulmonary complications.  She is aware and wishes to proceed.    After a thorough discussion of treatment options with the patient today, risks and benefits of operative intervention were reviewed with the patient.  The patient is a candidate for total knee arthroplasty.  They are aware of the risks of surgery including but not limited to DVT, pulmonary embolism, other cardiopulmonary events, infection, arthrofibrosis, persistent pain, skin  paresthesia, instability and need for additional procedures in the future.  The patient was also apprised of the risk of paresthesia to the anterior and lateral aspect of the knee postoperatively.  With an understanding of all the above, the patient wishes to proceed with operative intervention.    Laterality: left total knee arthroplasty  Anesthesia: Spinal  DVT Prophylaxis: Aspirin 81 mg twice daily  Plan [facility] overnight observation    Procedures (If performed, procedures will be listed here)    No follow-ups on file.                  -----------------------------------------------------------------------------------------------------------------------------------------------           ALLERGIES   Allergies   Allergen Reactions   • Amikacin Other (See Comments)     Developed Excessive Mucus, Fatigue and SOB    • Amoxicillin Diarrhea   • Ampicillin Diarrhea   • Mite Extract Other (See Comments)     Causes Sinus Issues     PAST MEDICAL HISTORY   Past Medical History:   Diagnosis Date   • Bronchiectasis without complication (HCC)    • Chronic obstructive pulmonary disease (HCC)    • Chronic sinusitis    • Eosinophilia    • Ganglion cyst of finger    • Gastroesophageal reflux disease    • History of infection by MDR Stenotrophomonas maltophilia    • Hyperglycemia    • Hyperlipidemia    • Insulin resistance    • Lung nodules    • Mucopurulent chronic bronchitis (HCC)    • Mycobacterium avium-intracellulare complex (HCC)    • Osteopenia of left hip    • Palpitation    • Primary osteoarthritis of left knee    • Pulse irregularity    • Pure hypercholesterolemia    • Shoulder impingement syndrome, left      FAMILY HISTORY  Family History   Problem Relation Age of Onset   • No Known Problems Mother    • No Known Problems Father    • No Known Problems Sister    • No Known Problems Brother    • No Known Problems Maternal Aunt    • No Known Problems Maternal Uncle    • No Known Problems Paternal Aunt    •  No Known Problems Paternal Uncle    • No Known Problems Maternal Grandmother    • No Known Problems Maternal Grandfather    • No Known Problems Paternal Grandmother    • No Known Problems Paternal Grandfather    • No Known Problems Other    • Anesth Problems Neg Hx    • Arthritis Neg Hx    • Bleeding Prob Neg Hx    • Cancer Neg Hx    • Diabetes Neg Hx    • Heart Disease Neg Hx    • High Blood Pressure Neg Hx    • Kidney Disease Neg Hx    • Thyroid Disease Neg Hx      SOCIAL HISTORY  Social History     Socioeconomic History   • Marital status:      Spouse name: None   • Number of children: None   • Years of education: None   • Highest education level: None   Tobacco Use   • Smoking status: Former     Current packs/day: 1.50     Average packs/day: 1.5 packs/day for 30.0 years (45.0 ttl pk-yrs)     Types: Cigarettes     Passive exposure: Past   • Smokeless tobacco: Never   Vaping Use   • Vaping status: Never Used   Substance and Sexual Activity   • Alcohol use: Yes     Alcohol/week: 1.2 oz     Types: 2 Glasses of wine per week   • Drug use: Never     Social Drivers of Health      [facility]    Family and Community Support   Intimate Partner Violence: Unknown (4/22/2024)    Received from [facility]    Abuse Screen    • Feels Unsafe at Home or Work/School: no    • Feels Threatened by Someone: no    • Does Anyone Try to Keep You From Having Contact with Others or Doing Things Outside Your Home?: no   Housing Stability: Unknown (4/22/2024)    Received from [facility]    Housing Stability    • Current Living Arrangements: home     SURGICAL HISTORY  Past Surgical History:   Procedure Laterality Date   • CATARACT REMOVAL     • HYSTERECTOMY     • TONSILLECTOMY     • WRIST FRACTURE SURGERY Left      CURRENT MEDICATIONS   Outpatient meds:   Current Outpatient Medications:   •  ADVAIR DISKUS 100-50 mcg/dose Inhl Disk with Device, Inhale 1  Puff into the lungs 2 times daily., Disp: , Rfl:   •  albuterol (PROVENTIL) 2.5 mg /3 mL (0.083 %) Inhl Solution for Nebulization, USE 3 ML VIA NEBULIZER TWICE DAILY INHALE OVER 5-15 MINUTES, Disp: , Rfl:   •  ascorbic acid, vitamin C, 500 mg Oral Capsule, Take  by mouth., Disp: , Rfl:   •  azithromycin (ZITHROMAX) 250 mg Oral Tablet, , Disp: , Rfl:   •  budesonide-formoteroL (SYMBICORT) 80-4.5 mcg/actuation Inhl HFA Aerosol Inhaler, INHALE 2 PUFFS BY MOUTH DAILY, Disp: , Rfl:   •  Coenzyme Q10 100 mg Oral Capsule, Take 200 mg by mouth daily., Disp: , Rfl:   •  ethambutoL (MYAMBUTOL) 400 mg Oral Tablet, , Disp: , Rfl:   •  ibuprofen-famotidine (DUEXIS) 800-26.6 mg Oral Tablet, Take 1 Tablet by mouth 2 times daily as needed for Pain., Disp: 30 Tablet, Rfl: 0  •  rifAMPin (RIFADIN) 300 mg Oral Capsule, Take 2 Capsules by mouth daily., Disp: , Rfl:     =====================================================================================================    Mitchell Arevalo MD    Adult Reconstruction/Total Joint Replacement, Orthopaedic Trauma  General Orthopaedic Surgery    [facility]    Fellow member [employer]          [phone]  [email or web address]       Parts of this note may have been created by a chart review, combined by taking my own patient history. The patient was physically seen and examined by myself, including a personal review of images, tests, and formation of the impression and plan. In addition, this note may been dictated utilizing voice recognition software. Unfortunately this leads to occasional typographical errors. I apologize in advance if the situation occurs. If questions occur about dictation mistakes, please do not hesitate to call our office. The patient was advised to call with any issues or concerns in the future.

## 2024-10-02 ENCOUNTER — PRE-ADMISSION TESTING (OUTPATIENT)
Dept: PREADMISSION TESTING | Facility: HOSPITAL | Age: 84
End: 2024-10-02
Payer: MEDICARE

## 2024-10-02 VITALS
BODY MASS INDEX: 23.64 KG/M2 | HEART RATE: 81 BPM | DIASTOLIC BLOOD PRESSURE: 84 MMHG | SYSTOLIC BLOOD PRESSURE: 152 MMHG | HEIGHT: 61 IN | RESPIRATION RATE: 20 BRPM | OXYGEN SATURATION: 96 % | WEIGHT: 125.2 LBS | TEMPERATURE: 97.5 F

## 2024-10-02 LAB
ALBUMIN SERPL-MCNC: 4.3 G/DL (ref 3.5–5.2)
ALBUMIN/GLOB SERPL: 1.7 G/DL
ALP SERPL-CCNC: 57 U/L (ref 39–117)
ALT SERPL W P-5'-P-CCNC: 14 U/L (ref 1–33)
ANION GAP SERPL CALCULATED.3IONS-SCNC: 9.9 MMOL/L (ref 5–15)
AST SERPL-CCNC: 24 U/L (ref 1–32)
BILIRUB SERPL-MCNC: 0.6 MG/DL (ref 0–1.2)
BUN SERPL-MCNC: 11 MG/DL (ref 8–23)
BUN/CREAT SERPL: 13.9 (ref 7–25)
CALCIUM SPEC-SCNC: 9.4 MG/DL (ref 8.6–10.5)
CHLORIDE SERPL-SCNC: 99 MMOL/L (ref 98–107)
CO2 SERPL-SCNC: 27.1 MMOL/L (ref 22–29)
CREAT SERPL-MCNC: 0.79 MG/DL (ref 0.57–1)
DEPRECATED RDW RBC AUTO: 41 FL (ref 37–54)
EGFRCR SERPLBLD CKD-EPI 2021: 73 ML/MIN/1.73
ERYTHROCYTE [DISTWIDTH] IN BLOOD BY AUTOMATED COUNT: 12.6 % (ref 12.3–15.4)
GLOBULIN UR ELPH-MCNC: 2.6 GM/DL
GLUCOSE SERPL-MCNC: 101 MG/DL (ref 65–99)
HBA1C MFR BLD: 6 % (ref 4.8–5.6)
HCT VFR BLD AUTO: 36.7 % (ref 34–46.6)
HGB BLD-MCNC: 12.3 G/DL (ref 12–15.9)
INR PPP: 0.96 (ref 0.9–1.1)
MCH RBC QN AUTO: 29.6 PG (ref 26.6–33)
MCHC RBC AUTO-ENTMCNC: 33.5 G/DL (ref 31.5–35.7)
MCV RBC AUTO: 88.4 FL (ref 79–97)
PLATELET # BLD AUTO: 268 10*3/MM3 (ref 140–450)
PMV BLD AUTO: 8.8 FL (ref 6–12)
POTASSIUM SERPL-SCNC: 4.2 MMOL/L (ref 3.5–5.2)
PROT SERPL-MCNC: 6.9 G/DL (ref 6–8.5)
PROTHROMBIN TIME: 13 SECONDS (ref 11.7–14.2)
QT INTERVAL: 375 MS
QTC INTERVAL: 396 MS
RBC # BLD AUTO: 4.15 10*6/MM3 (ref 3.77–5.28)
SODIUM SERPL-SCNC: 136 MMOL/L (ref 136–145)
WBC NRBC COR # BLD AUTO: 6.54 10*3/MM3 (ref 3.4–10.8)

## 2024-10-02 PROCEDURE — 93005 ELECTROCARDIOGRAM TRACING: CPT

## 2024-10-02 PROCEDURE — 83036 HEMOGLOBIN GLYCOSYLATED A1C: CPT

## 2024-10-02 PROCEDURE — 85610 PROTHROMBIN TIME: CPT

## 2024-10-02 PROCEDURE — 80053 COMPREHEN METABOLIC PANEL: CPT

## 2024-10-02 PROCEDURE — 36415 COLL VENOUS BLD VENIPUNCTURE: CPT

## 2024-10-02 PROCEDURE — 85027 COMPLETE CBC AUTOMATED: CPT

## 2024-10-02 RX ORDER — BUDESONIDE AND FORMOTEROL FUMARATE DIHYDRATE 160; 4.5 UG/1; UG/1
2 AEROSOL RESPIRATORY (INHALATION)
COMMUNITY
Start: 2024-09-26

## 2024-10-02 RX ORDER — AZITHROMYCIN 250 MG/1
250 TABLET, FILM COATED ORAL 3 TIMES WEEKLY
COMMUNITY

## 2024-10-02 NOTE — DISCHARGE INSTRUCTIONS
CHLORHEXIDINE CLOTH INSTRUCTIONS  The morning of surgery follow these instructions using the Chlorhexidine cloths you've been given.  These steps reduce bacteria on the body.  Do not use the cloths near your eyes, ears mouth, genitalia or on open wounds.  Throw the cloths away after use but do not try to flush them down a toilet.      Open and remove one cloth at a time from the package.    Leave the cloth unfolded and begin the bathing.  Massage the skin with the cloths using gentle pressure to remove bacteria.  Do not scrub harshly.   Follow the steps below with one 2% CHG cloth per area (6 total cloths).  One cloth for neck, shoulders and chest.  One cloth for both arms, hands, fingers and underarms (do underarms last).  One cloth for the abdomen followed by groin.  One cloth for right leg and foot including between the toes.  One cloth for left leg and foot including between the toes.  The last cloth is to be used for the back of the neck, back and buttocks.    Allow the CHG to air dry 3 minutes on the skin which will give it time to work and decrease the chance of irritation.  The skin may feel sticky until it is dry.  Do not rinse with water or any other liquid or you will lose the beneficial effects of the CHG.  If mild skin irritation occurs, do rinse the skin to remove the CHG.  Report this to the nurse at time of admission.  Do not apply lotions, creams, ointments, deodorants or perfumes after using the clothes. Dress in clean clothes before coming to the hospital.    Take the following medications the morning of surgery:  INHALERS, AZITHROMYCIN, RIFAMPIN, AND ETHAMBUTOL      If you are on prescription narcotic pain medication to control your pain you may also take that medication the morning of surgery.      General Instructions:     Do not eat solid food after midnight the night before surgery.  Clear liquids day of surgery are allowed but must be stopped at least two hours before your hospital arrival  time.       Allowed clear liquids      Water, sodas, and tea or coffee with no cream or milk added.       12 to 20 ounces of a clear liquid that contains carbohydrates is recommended.  If non-diabetic, have Gatorade or Powerade.  If diabetic, have G2 or Powerade Zero.     Do not have liquids red in color.  Do not consume chicken, beef, pork or vegetable broth or bouillon cubes of any variety as they are not considered clear liquids and are not allowed.      Infants may have breast milk up to four hours before surgery.  Infants drinking formula may drink formula up to six hours before surgery.   Patients who avoid smoking, chewing tobacco and alcohol for 4 weeks prior to surgery have a reduced risk of post-operative complications.  Quit smoking as many days before surgery as you can.  Do not smoke, use chewing tobacco or drink alcohol the day of surgery.   If applicable bring your C-PAP/ BI-PAP machine in with you to preop day of surgery.  Bring any papers given to you in the doctor’s office.  Wear clean comfortable clothes.  Do not wear contact lenses, false eyelashes or make-up.  Bring a case for your glasses.   Bring crutches or walker if applicable.  Remove all piercings.  Leave jewelry and any other valuables at home.  Hair extensions with metal clips must be removed prior to surgery.  The Pre-Admission Testing nurse will instruct you to bring medications if unable to obtain an accurate list in Pre-Admission Testing.          Preventing a Surgical Site Infection:  For 2 to 3 days before surgery, avoid shaving with a razor because the razor can irritate skin and make it easier to develop an infection.    Any areas of open skin can increase the risk of a post-operative wound infection by allowing bacteria to enter and travel throughout the body.  Notify your surgeon if you have any skin wounds / rashes even if it is not near the expected surgical site.  The area will need assessed to determine if surgery should be  delayed until it is healed.  The night prior to surgery shower using a fresh bar of anti-bacterial soap (such as Dial) and clean washcloth.  Sleep in a clean bed with clean clothing.  Do not allow pets to sleep with you.  Shower on the morning of surgery using a fresh bar of anti-bacterial soap (such as Dial) and clean washcloth.  Dry with a clean towel and dress in clean clothing.  Ask your surgeon if you will be receiving antibiotics prior to surgery.  Make sure you, your family, and all healthcare providers clean their hands with soap and water or an alcohol based hand  before caring for you or your wound.    Day of surgery:  Your arrival time is approximately two hours before your scheduled surgery time.  Please note if you have an early arrival time the surgery doors do not open before 5:00 AM.  Upon arrival, a Pre-op nurse and Anesthesiologist will review your health history, obtain vital signs, and answer questions you may have.  The only belongings needed at this time will be a list of your home medications and if applicable your C-PAP/BI-PAP machine.  A Pre-op nurse will start an IV and you may receive medication in preparation for surgery, including something to help you relax.     Please be aware that surgery does come with discomfort.  We want to make every effort to control your discomfort so please discuss any uncontrolled symptoms with your nurse.   Your doctor will most likely have prescribed pain medications.      If you are going home after surgery you will receive individualized written care instructions before being discharged.  A responsible adult must drive you to and from the hospital on the day of your surgery and ideally stay with you through the night.   .  Discharge prescriptions can be filled by the hospital pharmacy during regular pharmacy hours.  If you are having surgery late in the day/evening your prescription may be e-prescribed to your pharmacy.  Please verify your  pharmacy hours or chose a 24 hour pharmacy to avoid not having access to your prescription because your pharmacy has closed for the day.    If you are staying overnight following surgery, you will be transported to your hospital room following the recovery period.  [facility] has all private rooms.    If you have any questions please call Pre-Admission Testing at [phone].  Deductibles and co-payments are collected on the day of service. Please be prepared to pay the required co-pay, deductible or deposit on the day of service as defined by your plan.    Call your surgeon immediately if you experience any of the following symptoms:  Sore Throat  Shortness of Breath or difficulty breathing  Cough  Chills  Body soreness or muscle pain  Headache  Fever  New loss of taste or smell  Do not arrive for your surgery ill.  Your procedure will need to be rescheduled to another time.  You will need to call your physician before the day of surgery to avoid any unnecessary exposure to hospital staff as well as other patients.

## 2024-10-03 NOTE — PAT
Patient scheduled for a left total knee arthroplasty with Dr. Arevalo.  Patient has (MARIAMA) Mycobacterium avium-intracellulare.  She has pulmonary clearance.  See outside records.  She has no complaint of CP or SOA today.    Cardiac:RRR  Pulm:  Clear to ausc  PAT results reviewed and acceptable.

## 2024-10-23 ENCOUNTER — APPOINTMENT (OUTPATIENT)
Dept: GENERAL RADIOLOGY | Facility: HOSPITAL | Age: 84
End: 2024-10-23
Payer: MEDICARE

## 2024-10-23 ENCOUNTER — HOSPITAL ENCOUNTER (OUTPATIENT)
Facility: HOSPITAL | Age: 84
Setting detail: OBSERVATION
Discharge: HOME HEALTH | End: 2024-10-24
Attending: ORTHOPAEDIC SURGERY | Admitting: ORTHOPAEDIC SURGERY
Payer: MEDICARE

## 2024-10-23 ENCOUNTER — ANESTHESIA (OUTPATIENT)
Dept: PERIOP | Facility: HOSPITAL | Age: 84
End: 2024-10-23
Payer: MEDICARE

## 2024-10-23 ENCOUNTER — ANESTHESIA EVENT (OUTPATIENT)
Dept: PERIOP | Facility: HOSPITAL | Age: 84
End: 2024-10-23
Payer: MEDICARE

## 2024-10-23 DIAGNOSIS — Z96.652 TOTAL KNEE REPLACEMENT STATUS, LEFT: Primary | ICD-10-CM

## 2024-10-23 PROCEDURE — 25010000002 LIDOCAINE PF 2% 2 % SOLUTION: Performed by: NURSE ANESTHETIST, CERTIFIED REGISTERED

## 2024-10-23 PROCEDURE — 94761 N-INVAS EAR/PLS OXIMETRY MLT: CPT

## 2024-10-23 PROCEDURE — A9270 NON-COVERED ITEM OR SERVICE: HCPCS | Performed by: ORTHOPAEDIC SURGERY

## 2024-10-23 PROCEDURE — 25010000002 CEFAZOLIN PER 500 MG: Performed by: ORTHOPAEDIC SURGERY

## 2024-10-23 PROCEDURE — 25810000003 LACTATED RINGERS PER 1000 ML: Performed by: STUDENT IN AN ORGANIZED HEALTH CARE EDUCATION/TRAINING PROGRAM

## 2024-10-23 PROCEDURE — 25010000002 PROPOFOL 10 MG/ML EMULSION: Performed by: NURSE ANESTHETIST, CERTIFIED REGISTERED

## 2024-10-23 PROCEDURE — 94669 MECHANICAL CHEST WALL OSCILL: CPT

## 2024-10-23 PROCEDURE — 25010000002 FENTANYL CITRATE (PF) 50 MCG/ML SOLUTION: Performed by: NURSE ANESTHETIST, CERTIFIED REGISTERED

## 2024-10-23 PROCEDURE — 94799 UNLISTED PULMONARY SVC/PX: CPT

## 2024-10-23 PROCEDURE — C1713 ANCHOR/SCREW BN/BN,TIS/BN: HCPCS | Performed by: ORTHOPAEDIC SURGERY

## 2024-10-23 PROCEDURE — G0378 HOSPITAL OBSERVATION PER HR: HCPCS

## 2024-10-23 PROCEDURE — 25010000002 FENTANYL CITRATE (PF) 50 MCG/ML SOLUTION: Performed by: STUDENT IN AN ORGANIZED HEALTH CARE EDUCATION/TRAINING PROGRAM

## 2024-10-23 PROCEDURE — 25010000002 EPINEPHRINE 1 MG/ML SOLUTION 30 ML VIAL: Performed by: ORTHOPAEDIC SURGERY

## 2024-10-23 PROCEDURE — 25010000002 CLONIDINE PER 1 MG: Performed by: ORTHOPAEDIC SURGERY

## 2024-10-23 PROCEDURE — 63710000001 PANTOPRAZOLE 40 MG TABLET DELAYED-RELEASE: Performed by: ORTHOPAEDIC SURGERY

## 2024-10-23 PROCEDURE — 25010000002 PROPOFOL 200 MG/20ML EMULSION: Performed by: NURSE ANESTHETIST, CERTIFIED REGISTERED

## 2024-10-23 PROCEDURE — 97161 PT EVAL LOW COMPLEX 20 MIN: CPT

## 2024-10-23 PROCEDURE — 25010000002 DEXAMETHASONE SODIUM PHOSPHATE 20 MG/5ML SOLUTION: Performed by: STUDENT IN AN ORGANIZED HEALTH CARE EDUCATION/TRAINING PROGRAM

## 2024-10-23 PROCEDURE — C1776 JOINT DEVICE (IMPLANTABLE): HCPCS | Performed by: ORTHOPAEDIC SURGERY

## 2024-10-23 PROCEDURE — 63710000001 ASPIRIN 81 MG TABLET DELAYED-RELEASE: Performed by: ORTHOPAEDIC SURGERY

## 2024-10-23 PROCEDURE — 73560 X-RAY EXAM OF KNEE 1 OR 2: CPT

## 2024-10-23 PROCEDURE — 25010000002 ROPIVACAINE PER 1 MG: Performed by: STUDENT IN AN ORGANIZED HEALTH CARE EDUCATION/TRAINING PROGRAM

## 2024-10-23 PROCEDURE — 94640 AIRWAY INHALATION TREATMENT: CPT

## 2024-10-23 PROCEDURE — 25010000002 KETOROLAC TROMETHAMINE PER 15 MG: Performed by: ORTHOPAEDIC SURGERY

## 2024-10-23 PROCEDURE — 63710000001 BUDESONIDE-FORMOTEROL 160-4.5 MCG/ACT AEROSOL 6 G INHALER: Performed by: ORTHOPAEDIC SURGERY

## 2024-10-23 PROCEDURE — 25010000002 ONDANSETRON PER 1 MG: Performed by: NURSE ANESTHETIST, CERTIFIED REGISTERED

## 2024-10-23 PROCEDURE — 63710000001 MELOXICAM 7.5 MG TABLET: Performed by: ORTHOPAEDIC SURGERY

## 2024-10-23 PROCEDURE — 97530 THERAPEUTIC ACTIVITIES: CPT

## 2024-10-23 PROCEDURE — 97110 THERAPEUTIC EXERCISES: CPT

## 2024-10-23 PROCEDURE — 25010000002 BUPIVACAINE (PF) 0.5 % SOLUTION: Performed by: STUDENT IN AN ORGANIZED HEALTH CARE EDUCATION/TRAINING PROGRAM

## 2024-10-23 PROCEDURE — 25010000002 ROPIVACAINE PER 1 MG: Performed by: ORTHOPAEDIC SURGERY

## 2024-10-23 DEVICE — FEMORAL COMPONENT SPHERIKA CEMENTED  S3+L
Type: IMPLANTABLE DEVICE | Site: KNEE | Status: FUNCTIONAL
Brand: GMK TOTAL KNEE SYSTEM

## 2024-10-23 DEVICE — WAX,BONE,NATURAL
Type: IMPLANTABLE DEVICE | Site: KNEE | Status: FUNCTIONAL
Brand: MEDLINE INDUSTRIES

## 2024-10-23 DEVICE — DEV CONTRL TISS STRATAFIX PDS PLS OS6 REV SZ1 18IN 45CM: Type: IMPLANTABLE DEVICE | Site: KNEE | Status: FUNCTIONAL

## 2024-10-23 DEVICE — CMT BONE PALACOS R HI/VISC 1X40: Type: IMPLANTABLE DEVICE | Site: KNEE | Status: FUNCTIONAL

## 2024-10-23 DEVICE — FIXED TIBIAL TRAY CEMENTED LEFT, SIZE 3
Type: IMPLANTABLE DEVICE | Site: KNEE | Status: FUNCTIONAL
Brand: GMK PRIMARY TOTAL KNEE SYSTEM

## 2024-10-23 DEVICE — TOTL KN: Type: IMPLANTABLE DEVICE | Status: FUNCTIONAL

## 2024-10-23 DEVICE — DEV CONTRL TISS STRATAFIX SPIRAL MNCRYL UD 3/0 PLS 30CM: Type: IMPLANTABLE DEVICE | Site: KNEE | Status: FUNCTIONAL

## 2024-10-23 DEVICE — TIBIAL INSERT FIXED SPHERE FLEX   #3/10 MM L E-CROSS
Type: IMPLANTABLE DEVICE | Site: KNEE | Status: FUNCTIONAL
Brand: GMK SPHERE TOTAL KNEE SYSTEM

## 2024-10-23 RX ORDER — HYDROCODONE BITARTRATE AND ACETAMINOPHEN 7.5; 325 MG/1; MG/1
2 TABLET ORAL EVERY 4 HOURS PRN
Status: DISCONTINUED | OUTPATIENT
Start: 2024-10-23 | End: 2024-10-24 | Stop reason: HOSPADM

## 2024-10-23 RX ORDER — PROPOFOL 10 MG/ML
INJECTION, EMULSION INTRAVENOUS AS NEEDED
Status: DISCONTINUED | OUTPATIENT
Start: 2024-10-23 | End: 2024-10-23 | Stop reason: SURG

## 2024-10-23 RX ORDER — FLUTICASONE PROPIONATE 50 UG/1
2 SPRAY, METERED NASAL DAILY PRN
Status: DISCONTINUED | OUTPATIENT
Start: 2024-10-23 | End: 2024-10-24 | Stop reason: HOSPADM

## 2024-10-23 RX ORDER — FENTANYL CITRATE 50 UG/ML
INJECTION, SOLUTION INTRAMUSCULAR; INTRAVENOUS AS NEEDED
Status: DISCONTINUED | OUTPATIENT
Start: 2024-10-23 | End: 2024-10-23 | Stop reason: SURG

## 2024-10-23 RX ORDER — DIPHENHYDRAMINE HYDROCHLORIDE 50 MG/ML
12.5 INJECTION INTRAMUSCULAR; INTRAVENOUS
Status: DISCONTINUED | OUTPATIENT
Start: 2024-10-23 | End: 2024-10-23 | Stop reason: HOSPADM

## 2024-10-23 RX ORDER — PROMETHAZINE HYDROCHLORIDE 25 MG/1
25 TABLET ORAL ONCE AS NEEDED
Status: DISCONTINUED | OUTPATIENT
Start: 2024-10-23 | End: 2024-10-23 | Stop reason: HOSPADM

## 2024-10-23 RX ORDER — ONDANSETRON 2 MG/ML
INJECTION INTRAMUSCULAR; INTRAVENOUS AS NEEDED
Status: DISCONTINUED | OUTPATIENT
Start: 2024-10-23 | End: 2024-10-23 | Stop reason: SURG

## 2024-10-23 RX ORDER — KETOROLAC TROMETHAMINE 15 MG/ML
15 INJECTION, SOLUTION INTRAMUSCULAR; INTRAVENOUS EVERY 6 HOURS
Status: COMPLETED | OUTPATIENT
Start: 2024-10-23 | End: 2024-10-24

## 2024-10-23 RX ORDER — BUPIVACAINE HYDROCHLORIDE 5 MG/ML
INJECTION, SOLUTION EPIDURAL; INTRACAUDAL
Status: COMPLETED | OUTPATIENT
Start: 2024-10-23 | End: 2024-10-23

## 2024-10-23 RX ORDER — ASPIRIN 81 MG/1
81 TABLET ORAL 2 TIMES DAILY
Status: DISCONTINUED | OUTPATIENT
Start: 2024-10-23 | End: 2024-10-24 | Stop reason: HOSPADM

## 2024-10-23 RX ORDER — PROMETHAZINE HYDROCHLORIDE 25 MG/1
25 SUPPOSITORY RECTAL ONCE AS NEEDED
Status: DISCONTINUED | OUTPATIENT
Start: 2024-10-23 | End: 2024-10-23 | Stop reason: HOSPADM

## 2024-10-23 RX ORDER — RIFAMPIN 300 MG/1
600 CAPSULE ORAL 3 TIMES WEEKLY
Status: DISCONTINUED | OUTPATIENT
Start: 2024-10-23 | End: 2024-10-24 | Stop reason: HOSPADM

## 2024-10-23 RX ORDER — LIDOCAINE HYDROCHLORIDE 20 MG/ML
INJECTION, SOLUTION EPIDURAL; INFILTRATION; INTRACAUDAL; PERINEURAL AS NEEDED
Status: DISCONTINUED | OUTPATIENT
Start: 2024-10-23 | End: 2024-10-23 | Stop reason: SURG

## 2024-10-23 RX ORDER — IPRATROPIUM BROMIDE AND ALBUTEROL SULFATE 2.5; .5 MG/3ML; MG/3ML
3 SOLUTION RESPIRATORY (INHALATION) ONCE AS NEEDED
Status: DISCONTINUED | OUTPATIENT
Start: 2024-10-23 | End: 2024-10-23 | Stop reason: HOSPADM

## 2024-10-23 RX ORDER — LIDOCAINE HYDROCHLORIDE 10 MG/ML
0.5 INJECTION, SOLUTION INFILTRATION; PERINEURAL ONCE AS NEEDED
Status: DISCONTINUED | OUTPATIENT
Start: 2024-10-23 | End: 2024-10-23 | Stop reason: HOSPADM

## 2024-10-23 RX ORDER — TRANEXAMIC ACID 100 MG/ML
INJECTION, SOLUTION INTRAVENOUS AS NEEDED
Status: DISCONTINUED | OUTPATIENT
Start: 2024-10-23 | End: 2024-10-23 | Stop reason: SURG

## 2024-10-23 RX ORDER — CETIRIZINE HYDROCHLORIDE 10 MG/1
10 TABLET ORAL DAILY
Status: DISCONTINUED | OUTPATIENT
Start: 2024-10-23 | End: 2024-10-24 | Stop reason: HOSPADM

## 2024-10-23 RX ORDER — TRAMADOL HYDROCHLORIDE 50 MG/1
50 TABLET ORAL EVERY 8 HOURS PRN
Status: DISCONTINUED | OUTPATIENT
Start: 2024-10-23 | End: 2024-10-24 | Stop reason: HOSPADM

## 2024-10-23 RX ORDER — HYDROMORPHONE HYDROCHLORIDE 1 MG/ML
0.5 INJECTION, SOLUTION INTRAMUSCULAR; INTRAVENOUS; SUBCUTANEOUS
Status: DISCONTINUED | OUTPATIENT
Start: 2024-10-23 | End: 2024-10-24 | Stop reason: HOSPADM

## 2024-10-23 RX ORDER — DOCUSATE SODIUM 100 MG/1
100 CAPSULE, LIQUID FILLED ORAL 2 TIMES DAILY PRN
Status: DISCONTINUED | OUTPATIENT
Start: 2024-10-23 | End: 2024-10-24 | Stop reason: HOSPADM

## 2024-10-23 RX ORDER — FLUMAZENIL 0.1 MG/ML
0.2 INJECTION INTRAVENOUS AS NEEDED
Status: DISCONTINUED | OUTPATIENT
Start: 2024-10-23 | End: 2024-10-23 | Stop reason: HOSPADM

## 2024-10-23 RX ORDER — PHENYLEPHRINE HCL IN 0.9% NACL 1 MG/10 ML
SYRINGE (ML) INTRAVENOUS AS NEEDED
Status: DISCONTINUED | OUTPATIENT
Start: 2024-10-23 | End: 2024-10-23 | Stop reason: SURG

## 2024-10-23 RX ORDER — DROPERIDOL 2.5 MG/ML
0.62 INJECTION, SOLUTION INTRAMUSCULAR; INTRAVENOUS
Status: DISCONTINUED | OUTPATIENT
Start: 2024-10-23 | End: 2024-10-23 | Stop reason: HOSPADM

## 2024-10-23 RX ORDER — DEXAMETHASONE SODIUM PHOSPHATE 4 MG/ML
INJECTION, SOLUTION INTRA-ARTICULAR; INTRALESIONAL; INTRAMUSCULAR; INTRAVENOUS; SOFT TISSUE
Status: COMPLETED | OUTPATIENT
Start: 2024-10-23 | End: 2024-10-23

## 2024-10-23 RX ORDER — HYDROCODONE BITARTRATE AND ACETAMINOPHEN 7.5; 325 MG/1; MG/1
1 TABLET ORAL EVERY 4 HOURS PRN
Status: DISCONTINUED | OUTPATIENT
Start: 2024-10-23 | End: 2024-10-24 | Stop reason: HOSPADM

## 2024-10-23 RX ORDER — PREGABALIN 75 MG/1
75 CAPSULE ORAL ONCE
Status: COMPLETED | OUTPATIENT
Start: 2024-10-23 | End: 2024-10-23

## 2024-10-23 RX ORDER — ONDANSETRON 2 MG/ML
4 INJECTION INTRAMUSCULAR; INTRAVENOUS EVERY 6 HOURS PRN
Status: DISCONTINUED | OUTPATIENT
Start: 2024-10-23 | End: 2024-10-24 | Stop reason: HOSPADM

## 2024-10-23 RX ORDER — LABETALOL HYDROCHLORIDE 5 MG/ML
5 INJECTION, SOLUTION INTRAVENOUS
Status: DISCONTINUED | OUTPATIENT
Start: 2024-10-23 | End: 2024-10-23 | Stop reason: HOSPADM

## 2024-10-23 RX ORDER — ONDANSETRON 2 MG/ML
4 INJECTION INTRAMUSCULAR; INTRAVENOUS ONCE AS NEEDED
Status: DISCONTINUED | OUTPATIENT
Start: 2024-10-23 | End: 2024-10-23 | Stop reason: HOSPADM

## 2024-10-23 RX ORDER — MAGNESIUM HYDROXIDE 1200 MG/15ML
LIQUID ORAL AS NEEDED
Status: DISCONTINUED | OUTPATIENT
Start: 2024-10-23 | End: 2024-10-23 | Stop reason: HOSPADM

## 2024-10-23 RX ORDER — ALBUTEROL SULFATE 0.83 MG/ML
2.5 SOLUTION RESPIRATORY (INHALATION) EVERY 6 HOURS PRN
Status: DISCONTINUED | OUTPATIENT
Start: 2024-10-23 | End: 2024-10-24 | Stop reason: HOSPADM

## 2024-10-23 RX ORDER — ROPIVACAINE HYDROCHLORIDE 5 MG/ML
INJECTION, SOLUTION EPIDURAL; INFILTRATION; PERINEURAL
Status: COMPLETED | OUTPATIENT
Start: 2024-10-23 | End: 2024-10-23

## 2024-10-23 RX ORDER — ONDANSETRON 4 MG/1
4 TABLET, ORALLY DISINTEGRATING ORAL EVERY 6 HOURS PRN
Status: DISCONTINUED | OUTPATIENT
Start: 2024-10-23 | End: 2024-10-24 | Stop reason: HOSPADM

## 2024-10-23 RX ORDER — FENTANYL CITRATE 50 UG/ML
50 INJECTION, SOLUTION INTRAMUSCULAR; INTRAVENOUS ONCE AS NEEDED
Status: COMPLETED | OUTPATIENT
Start: 2024-10-23 | End: 2024-10-23

## 2024-10-23 RX ORDER — SODIUM CHLORIDE 0.9 % (FLUSH) 0.9 %
3-10 SYRINGE (ML) INJECTION AS NEEDED
Status: DISCONTINUED | OUTPATIENT
Start: 2024-10-23 | End: 2024-10-23 | Stop reason: HOSPADM

## 2024-10-23 RX ORDER — CELECOXIB 200 MG/1
400 CAPSULE ORAL ONCE
Status: COMPLETED | OUTPATIENT
Start: 2024-10-23 | End: 2024-10-23

## 2024-10-23 RX ORDER — MELOXICAM 7.5 MG/1
7.5 TABLET ORAL DAILY
Status: DISCONTINUED | OUTPATIENT
Start: 2024-10-23 | End: 2024-10-24 | Stop reason: HOSPADM

## 2024-10-23 RX ORDER — SODIUM CHLORIDE 0.9 % (FLUSH) 0.9 %
3 SYRINGE (ML) INJECTION EVERY 12 HOURS SCHEDULED
Status: DISCONTINUED | OUTPATIENT
Start: 2024-10-23 | End: 2024-10-23 | Stop reason: HOSPADM

## 2024-10-23 RX ORDER — FENTANYL CITRATE 50 UG/ML
25 INJECTION, SOLUTION INTRAMUSCULAR; INTRAVENOUS
Status: DISCONTINUED | OUTPATIENT
Start: 2024-10-23 | End: 2024-10-23 | Stop reason: HOSPADM

## 2024-10-23 RX ORDER — SODIUM CHLORIDE FOR INHALATION 0.9 %
3 VIAL, NEBULIZER (ML) INHALATION
Status: DISCONTINUED | OUTPATIENT
Start: 2024-10-23 | End: 2024-10-24 | Stop reason: HOSPADM

## 2024-10-23 RX ORDER — EPHEDRINE SULFATE 50 MG/ML
5 INJECTION, SOLUTION INTRAVENOUS ONCE AS NEEDED
Status: DISCONTINUED | OUTPATIENT
Start: 2024-10-23 | End: 2024-10-23 | Stop reason: HOSPADM

## 2024-10-23 RX ORDER — PANTOPRAZOLE SODIUM 40 MG/1
40 TABLET, DELAYED RELEASE ORAL
Status: DISCONTINUED | OUTPATIENT
Start: 2024-10-23 | End: 2024-10-24 | Stop reason: HOSPADM

## 2024-10-23 RX ORDER — SODIUM CHLORIDE, SODIUM LACTATE, POTASSIUM CHLORIDE, CALCIUM CHLORIDE 600; 310; 30; 20 MG/100ML; MG/100ML; MG/100ML; MG/100ML
9 INJECTION, SOLUTION INTRAVENOUS CONTINUOUS
Status: DISCONTINUED | OUTPATIENT
Start: 2024-10-23 | End: 2024-10-24

## 2024-10-23 RX ORDER — AZITHROMYCIN 250 MG/1
250 TABLET, FILM COATED ORAL 3 TIMES WEEKLY
Status: DISCONTINUED | OUTPATIENT
Start: 2024-10-23 | End: 2024-10-24 | Stop reason: HOSPADM

## 2024-10-23 RX ORDER — HYDROCODONE BITARTRATE AND ACETAMINOPHEN 7.5; 325 MG/1; MG/1
1 TABLET ORAL EVERY 4 HOURS PRN
Status: DISCONTINUED | OUTPATIENT
Start: 2024-10-23 | End: 2024-10-23 | Stop reason: HOSPADM

## 2024-10-23 RX ORDER — BUDESONIDE AND FORMOTEROL FUMARATE DIHYDRATE 160; 4.5 UG/1; UG/1
2 AEROSOL RESPIRATORY (INHALATION)
Status: DISCONTINUED | OUTPATIENT
Start: 2024-10-23 | End: 2024-10-24 | Stop reason: HOSPADM

## 2024-10-23 RX ORDER — NALOXONE HCL 0.4 MG/ML
0.1 VIAL (ML) INJECTION
Status: DISCONTINUED | OUTPATIENT
Start: 2024-10-23 | End: 2024-10-24 | Stop reason: HOSPADM

## 2024-10-23 RX ORDER — ACETAMINOPHEN 500 MG
1000 TABLET ORAL ONCE
Status: COMPLETED | OUTPATIENT
Start: 2024-10-23 | End: 2024-10-23

## 2024-10-23 RX ORDER — HYDROMORPHONE HYDROCHLORIDE 1 MG/ML
0.25 INJECTION, SOLUTION INTRAMUSCULAR; INTRAVENOUS; SUBCUTANEOUS
Status: DISCONTINUED | OUTPATIENT
Start: 2024-10-23 | End: 2024-10-23 | Stop reason: HOSPADM

## 2024-10-23 RX ORDER — ALBUTEROL SULFATE 90 UG/1
2 INHALANT RESPIRATORY (INHALATION) EVERY 4 HOURS PRN
Status: DISCONTINUED | OUTPATIENT
Start: 2024-10-23 | End: 2024-10-23 | Stop reason: SDUPTHER

## 2024-10-23 RX ORDER — HYDROCODONE BITARTRATE AND ACETAMINOPHEN 5; 325 MG/1; MG/1
1 TABLET ORAL ONCE AS NEEDED
Status: DISCONTINUED | OUTPATIENT
Start: 2024-10-23 | End: 2024-10-23 | Stop reason: HOSPADM

## 2024-10-23 RX ORDER — NALOXONE HCL 0.4 MG/ML
0.2 VIAL (ML) INJECTION AS NEEDED
Status: DISCONTINUED | OUTPATIENT
Start: 2024-10-23 | End: 2024-10-23 | Stop reason: HOSPADM

## 2024-10-23 RX ORDER — HYDRALAZINE HYDROCHLORIDE 20 MG/ML
5 INJECTION INTRAMUSCULAR; INTRAVENOUS
Status: DISCONTINUED | OUTPATIENT
Start: 2024-10-23 | End: 2024-10-23 | Stop reason: HOSPADM

## 2024-10-23 RX ADMIN — Medication 200 MCG: at 11:23

## 2024-10-23 RX ADMIN — ISODIUM CHLORIDE 3 ML: 0.03 SOLUTION RESPIRATORY (INHALATION) at 20:29

## 2024-10-23 RX ADMIN — BUPIVACAINE HYDROCHLORIDE 1.8 ML: 5 INJECTION, SOLUTION EPIDURAL; INTRACAUDAL; PERINEURAL at 10:05

## 2024-10-23 RX ADMIN — ONDANSETRON 4 MG: 2 INJECTION INTRAMUSCULAR; INTRAVENOUS at 10:08

## 2024-10-23 RX ADMIN — ACETAMINOPHEN 1000 MG: 500 TABLET ORAL at 07:31

## 2024-10-23 RX ADMIN — FENTANYL CITRATE 50 MCG: 50 INJECTION, SOLUTION INTRAMUSCULAR; INTRAVENOUS at 08:37

## 2024-10-23 RX ADMIN — TRANEXAMIC ACID 1000 MG: 100 INJECTION, SOLUTION INTRAVENOUS at 11:09

## 2024-10-23 RX ADMIN — PREGABALIN 75 MG: 75 CAPSULE ORAL at 07:29

## 2024-10-23 RX ADMIN — BUDESONIDE AND FORMOTEROL FUMARATE DIHYDRATE 2 PUFF: 160; 4.5 AEROSOL RESPIRATORY (INHALATION) at 20:29

## 2024-10-23 RX ADMIN — KETOROLAC TROMETHAMINE 15 MG: 15 INJECTION, SOLUTION INTRAMUSCULAR; INTRAVENOUS at 20:21

## 2024-10-23 RX ADMIN — ROPIVACAINE HYDROCHLORIDE 15 ML: 5 INJECTION EPIDURAL; INFILTRATION; PERINEURAL at 08:39

## 2024-10-23 RX ADMIN — ALBUTEROL SULFATE 2.5 MG: 2.5 SOLUTION RESPIRATORY (INHALATION) at 20:29

## 2024-10-23 RX ADMIN — PROPOFOL 50 MG: 10 INJECTION, EMULSION INTRAVENOUS at 10:05

## 2024-10-23 RX ADMIN — TRANEXAMIC ACID 1000 MG: 100 INJECTION, SOLUTION INTRAVENOUS at 10:13

## 2024-10-23 RX ADMIN — MELOXICAM 7.5 MG: 7.5 TABLET ORAL at 16:25

## 2024-10-23 RX ADMIN — DEXAMETHASONE SODIUM PHOSPHATE 4 MG: 4 INJECTION, SOLUTION INTRAMUSCULAR; INTRAVENOUS at 08:39

## 2024-10-23 RX ADMIN — LIDOCAINE HYDROCHLORIDE 40 MG: 20 INJECTION, SOLUTION EPIDURAL; INFILTRATION; INTRACAUDAL; PERINEURAL at 10:05

## 2024-10-23 RX ADMIN — PROPOFOL 50 MCG/KG/MIN: 10 INJECTION, EMULSION INTRAVENOUS at 10:05

## 2024-10-23 RX ADMIN — KETOROLAC TROMETHAMINE 15 MG: 15 INJECTION, SOLUTION INTRAMUSCULAR; INTRAVENOUS at 16:25

## 2024-10-23 RX ADMIN — CELECOXIB 400 MG: 200 CAPSULE ORAL at 07:29

## 2024-10-23 RX ADMIN — FENTANYL CITRATE 25 MCG: 50 INJECTION, SOLUTION INTRAMUSCULAR; INTRAVENOUS at 10:26

## 2024-10-23 RX ADMIN — DEXAMETHASONE SODIUM PHOSPHATE 8 MG: 4 INJECTION, SOLUTION INTRAMUSCULAR; INTRAVENOUS at 10:08

## 2024-10-23 RX ADMIN — SODIUM CHLORIDE, POTASSIUM CHLORIDE, SODIUM LACTATE AND CALCIUM CHLORIDE: 600; 310; 30; 20 INJECTION, SOLUTION INTRAVENOUS at 11:36

## 2024-10-23 RX ADMIN — SODIUM CHLORIDE 2000 MG: 900 INJECTION INTRAVENOUS at 09:48

## 2024-10-23 RX ADMIN — CEFAZOLIN 2000 MG: 2 INJECTION, POWDER, FOR SOLUTION INTRAMUSCULAR; INTRAVENOUS at 18:56

## 2024-10-23 RX ADMIN — ASPIRIN 81 MG: 81 TABLET, COATED ORAL at 18:43

## 2024-10-23 RX ADMIN — PANTOPRAZOLE SODIUM 40 MG: 40 TABLET, DELAYED RELEASE ORAL at 18:43

## 2024-10-23 NOTE — DISCHARGE PLACEMENT REQUEST
"Mike Trinh (84 y.o. Female)       Date of Birth   1940    Social Security Number       Address   [address] [place] [address]    Home Phone   [phone]    MRN   [id number]       Sikh   Yarsani    Marital Status                               Admission Date   10/23/24    Admission Type   Elective    Admitting Provider   Mitchell Arevalo MD    Attending Provider   Mitchell Arevalo MD    Department, Room/Bed   [address], P793/1       Discharge Date       Discharge Disposition       Discharge Destination                                 Attending Provider: Mitchell Arevalo MD    Allergies: Amoxicillin, Ampicillin, Dust Mite Extract    Isolation: None   Infection: None   Code Status: Prior    Ht: 154.9 cm (61\")   Wt: 56.8 kg (125 lb 3.5 oz)    Admission Cmt: None   Principal Problem: Total knee replacement status, left [Z96.652]                   Active Insurance as of 10/23/2024       Primary Coverage       Payor Plan Insurance Group Employer/Plan Group    MEDICARE MEDICARE A & B        Payor Plan Address Payor Plan Phone Number Payor Plan Fax Number Effective Dates    [address] [phone]  5/1/2003 - None Entered    [facility] [id number]         Subscriber Name Subscriber Birth Date Member ID       MIKE TRINH 1940 [id number]               Secondary Coverage       Payor Plan Insurance Group Employer/Plan Group    AARP MC SUP AARP HEALTH CARE OPTIONS        Payor Plan Address Payor Plan Phone Number Payor Plan Fax Number Effective Dates    [facility] [phone]  1/1/2015 - None Entered    [address]       [facility] [address]         Subscriber Name Subscriber Birth Date Member ID       MIKE TRINH 1940 [id number]                     Emergency Contacts        (Rel.) Home Phone Work Phone Mobile Phone    LU,LISA (Daughter) [phone] -- [phone]    ArbovaleJordy alvarenga (Spouse) [phone] -- --          "

## 2024-10-23 NOTE — PLAN OF CARE
Problem: Adult Inpatient Plan of Care  Goal: Plan of Care Review  Outcome: Progressing  Goal: Patient-Specific Goal (Individualized)  Outcome: Progressing  Goal: Absence of Hospital-Acquired Illness or Injury  Outcome: Progressing  Intervention: Identify and Manage Fall Risk  Recent Flowsheet Documentation  Taken 10/23/2024 1400 by Suyapa Gamboa RN  Safety Promotion/Fall Prevention:   fall prevention program maintained   assistive device/personal items within reach   clutter free environment maintained   safety round/check completed   room organization consistent   nonskid shoes/slippers when out of bed  Goal: Optimal Comfort and Wellbeing  Outcome: Progressing  Goal: Readiness for Transition of Care  Outcome: Progressing  Intervention: Mutually Develop Transition Plan  Recent Flowsheet Documentation  Taken 10/23/2024 1423 by Suyapa Gamboa RN  Transportation Anticipated: family or friend will provide  Patient/Family Anticipated Services at Transition: none  Patient/Family Anticipates Transition to: home with family  Taken 10/23/2024 1422 by Suyapa Gamboa RN  Equipment Currently Used at Home: cane, straight     Problem: Knee Arthroplasty  Goal: Optimal Coping  Outcome: Progressing  Goal: Absence of Bleeding  Outcome: Progressing  Goal: Effective Bowel Elimination  Outcome: Progressing  Goal: Fluid and Electrolyte Balance  Outcome: Progressing  Goal: Optimal Functional Ability  Outcome: Progressing  Goal: Absence of Infection Signs and Symptoms  Outcome: Progressing  Goal: Intact Neurovascular Status  Outcome: Progressing  Goal: Anesthesia/Sedation Recovery  Outcome: Progressing  Intervention: Optimize Anesthesia Recovery  Recent Flowsheet Documentation  Taken 10/23/2024 1400 by Suyapa Gamboa RN  Safety Promotion/Fall Prevention:   fall prevention program maintained   assistive device/personal items within reach   clutter free environment maintained   safety round/check completed   room organization consistent   nonskid  shoes/slippers when out of bed  Goal: Optimal Pain Control and Function  Outcome: Progressing  Goal: Nausea and Vomiting Relief  Outcome: Progressing  Goal: Effective Urinary Elimination  Outcome: Progressing  Goal: Effective Oxygenation and Ventilation  Outcome: Progressing   Goal Outcome Evaluation:

## 2024-10-23 NOTE — THERAPY EVALUATION
Patient Name: Jenniffer Dumont  : 1940    MRN: [id number]                              Today's Date: 10/23/2024       Admit Date: 10/23/2024    Visit Dx: No diagnosis found.  Patient Active Problem List   Diagnosis    Bronchiectasis with (acute) exacerbation    Cough    HLD (hyperlipidemia)    MARIAMA (mycobacterium avium-intracellulare)    Allergic sinusitis    History of melanoma    Gastroesophageal reflux disease    Hyperglycemia    Pneumonia due to infectious organism    Acute hyponatremia    Chronic pain of left knee    Osteopenia of left hip    Total knee replacement status, left     Past Medical History:   Diagnosis Date    Arthritis     Bronchiectasis     FOLLOWED BY [facility]    GERD (gastroesophageal reflux disease)     History of bronchoscopy     History of melanoma     History of tachycardia     MEDICATION RELATED - NO PROBLEMS SINCE    HL (hearing loss) ?    Hyperlipidemia     MARIAMA (mycobacterium avium-intracellulare)     FOLLOWED BY [facility]    Osteopenia ?    PONV (postoperative nausea and vomiting)     Scoliosis      Past Surgical History:   Procedure Laterality Date    APPENDECTOMY      BRONCHOSCOPY      BUNIONECTOMY Left     CATARACT EXTRACTION, BILATERAL      COLONOSCOPY      ENDOSCOPY      HYSTERECTOMY      ORIF WRIST FRACTURE Left     SKIN CANCER EXCISION      MELANOMA REMOVAL  - LEG    TONSILLECTOMY        General Information       Row Name 10/23/24 0004          Physical Therapy Time and Intention    Document Type evaluation  -MG     Mode of Treatment individual therapy;physical therapy  -MG       Row Name 10/23/24 3911          General Information    Patient Profile Reviewed yes  -MG     Prior Level of Function independent:  No AD. Owns a RW, rollator and BSC. Helps spouse w/ meds and meals, does not require much physical assist.  -MG     Existing Precautions/Restrictions fall  -MG     Barriers to Rehab none identified  -MG       Row Name 10/23/24 8275          Living  Environment    People in Home spouse  Dtr and Gdtr to stay and assist both.  -MG       Row Name 10/23/24 1625          Home Main Entrance    Number of Stairs, Main Entrance two  -MG     Stair Railings, Main Entrance none  -MG       Row Name 10/23/24 1625          Stairs Within Home, Primary    Number of Stairs, Within Home, Primary none  -MG       Row Name 10/23/24 1625          Cognition    Orientation Status (Cognition) oriented x 4  -MG       Row Name 10/23/24 1625          Safety Issues/Impairments Affecting Functional Mobility    Safety Issues Affecting Function (Mobility) awareness of need for assistance;insight into deficits/self-awareness  -MG     Impairments Affecting Function (Mobility) balance;endurance/activity tolerance;pain;range of motion (ROM);strength  -MG     Comment, Safety Issues/Impairments (Mobility) Gait belt and non-skid socks donned.  -MG               User Key  (r) = Recorded By, (t) = Taken By, (c) = Cosigned By      Initials Name Provider Type    MG Christine Pal, PT Physical Therapist                   Mobility       Row Name 10/23/24 1626          Bed Mobility    Bed Mobility supine-sit  -MG     Supine-Sit [place] (Bed Mobility) standby assist;verbal cues  -MG     Assistive Device (Bed Mobility) head of bed elevated  -MG       Row Name 10/23/24 1626          Sit-Stand Transfer    Sit-Stand [place] (Transfers) contact guard;verbal cues  -MG     Assistive Device (Sit-Stand Transfers) walker, front-wheeled  -MG     Comment, (Sit-Stand Transfer) Cues for hand placement and sequencing. CNA present for safety. x1 EOB, x1 chair.  -MG       Row Name 10/23/24 1626          Gait/Stairs (Locomotion)    [place] Level (Gait) contact guard;minimum assist (75% patient effort);verbal cues  -MG     Assistive Device (Gait) walker, front-wheeled  -MG     Distance in Feet (Gait) 14  -MG     Deviations/Abnormal Patterns (Gait) gait speed decreased;antalgic;stride length decreased  -MG      Bilateral Gait Deviations forward flexed posture;heel strike decreased  -MG     Left Sided Gait Deviations weight shift ability decreased  -MG     Comment, (Gait/Stairs) Step-to pattern. Cues for sequencing and posture. Pt initially NWB on LLE, but able to progress to WBAT w/o knee buckling. Reports LLE still feels numb, but does not feel that it is going to buckle. No overt LOB, dizziness or SOB.  -MG       Row Name 10/23/24 1626          Mobility    Extremity Weight-bearing Status left lower extremity  -MG     Left Lower Extremity (Weight-bearing Status) weight-bearing as tolerated (WBAT)  -MG               User Key  (r) = Recorded By, (t) = Taken By, (c) = Cosigned By      Initials Name Provider Type    Christine Kathleen, PT Physical Therapist                   Obj/Interventions       Row Name 10/23/24 1628          Range of Motion Comprehensive    General Range of Motion lower extremity range of motion deficits identified  -MG     Comment, General Range of Motion RLE, L hip/ankle WFL. L knee AROM 3-90.  -MG       Row Name 10/23/24 1628          Strength Comprehensive (MMT)    General Manual Muscle Testing (MMT) Assessment lower extremity strength deficits identified  -MG     Comment, General Manual Muscle Testing (MMT) Assessment Generalized post-op weakness.  -MG       Row Name 10/23/24 1628          Motor Skills    Therapeutic Exercise other (see comments)  TKA protocol x5. Edu to perform x10 later this PM.  -MG       Row Name 10/23/24 1628          Balance    Comment, Balance No knee buckling or overt LOB. Static standing w/ CNA and CGA while this PT performed pericare/cleaning of LEs.  -MG       Row Name 10/23/24 1628          Sensory Assessment (Somatosensory)    Sensory Assessment (Somatosensory) sensation intact  Numbness to LLE. Reports RLE has improved.  -MG               User Key  (r) = Recorded By, (t) = Taken By, (c) = Cosigned By      Initials Name Provider Type    Christine Kathleen, PT Physical  Therapist                   Goals/Plan       Row Name 10/23/24 1630          Bed Mobility Goal 1 (PT)    Activity/Assistive Device (Bed Mobility Goal 1, PT) bed mobility activities, all  -MG     Story Level/Cues Needed (Bed Mobility Goal 1, PT) supervision required;modified independence  -MG     Time Frame (Bed Mobility Goal 1, PT) 3 days  -MG       Row Name 10/23/24 1630          Transfer Goal 1 (PT)    Activity/Assistive Device (Transfer Goal 1, PT) transfers, all  -MG     Story Level/Cues Needed (Transfer Goal 1, PT) supervision required;standby assist  -MG     Time Frame (Transfer Goal 1, PT) 3 days  -MG       Row Name 10/23/24 1630          Gait Training Goal 1 (PT)    Activity/Assistive Device (Gait Training Goal 1, PT) gait (walking locomotion)  -MG     Story Level (Gait Training Goal 1, PT) standby assist;supervision required  -MG     Distance (Gait Training Goal 1, PT) 80  -MG     Time Frame (Gait Training Goal 1, PT) 3 days  -MG       Row Name 10/23/24 1630          Stairs Goal 1 (PT)    Activity/Assistive Device (Stairs Goal 1, PT) stairs, all skills  -MG     Story Level/Cues Needed (Stairs Goal 1, PT) minimum assist (75% or more patient effort)  -MG     Number of Stairs (Stairs Goal 1, PT) 2  -MG     Time Frame (Stairs Goal 1, PT) 3 days  -MG       Row Name 10/23/24 1630          Therapy Assessment/Plan (PT)    Planned Therapy Interventions (PT) balance training;bed mobility training;gait training;home exercise program;patient/family education;stretching;strengthening;stair training;neuromuscular re-education;ROM (range of motion);postural re-education;transfer training  -MG               User Key  (r) = Recorded By, (t) = Taken By, (c) = Cosigned By      Initials Name Provider Type    MG Christine Pal, PT Physical Therapist                   Clinical Impression       Row Name 10/23/24 1629          Pain    Pretreatment Pain Rating 0/10 - no pain  -MG     Posttreatment Pain  Rating 1/10  -MG     Pain Location knee  -MG     Pain Side/Orientation left  -MG     Pain Management Interventions premedicated for activity;exercise or physical activity utilized;activity modification encouraged;nursing notified  -MG     Response to Pain Interventions activity participation with tolerable pain;activity level improved;mobility function improved  -MG     Pre/Posttreatment Pain Comment Reports not really pain, but a dull ache.  -MG       Row Name 10/23/24 8706          Plan of Care Review    Plan of Care Reviewed With patient  -MG     Progress improving  -MG     Outcome Evaluation Pt is a 83 y/o F POD0 L TKA. Pt reports being independent at baseline without use of AD, owns a RW, rollator and BSC, and lives with her  in a house w/ 2 YUSRA, no HR, and plans for her dtr and gdtr to stay and assist. Today, pt was SBA for bed mobility, CGA for STS w/ RW and CG/Divine for ambulation of 14' w/ RW demoing typical post-op antalgic gait mechanics in a step-to pattern. Pt initially NWB on LLE d/t cont numbness, but able to progress to WBAT. No knee buckling, LOB, SOB, or dizziness. Pt was CGA via CNA for static standing w/ RW while this PT performed pericare/LE cleaning. Pt performed TKA protocol ther-ex for 5 reps demoing L knee AROM of 3-90deg. Pt edu on HEP, icing, positioning, sequencing and safety of ambulation, usage of gait belt, activity recs, falls prevention and energy conservation; they v/u. Pt will benefit from skilled PT services to address their post-op impaired gait, balance, ROM and endurance. SBAR w/ RN following session. Rec home w/ HH PT at [place].  -MG       Row Name 10/23/24 0737          Therapy Assessment/Plan (PT)    Rehab Potential (PT) good  -MG     Criteria for Skilled Interventions Met (PT) yes;meets criteria  -MG     Therapy Frequency (PT) daily  -MG       Row Name 10/23/24 6448          Vital Signs    O2 Delivery Pre Treatment room air  -MG     O2 Delivery Intra Treatment room air   -MG     O2 Delivery Post Treatment room air  -MG     Pre Patient Position Supine  -MG     Intra Patient Position Standing  -MG     Post Patient Position Sitting  -MG       Row Name 10/23/24 1629          Positioning and Restraints    Pre-Treatment Position in bed  -MG     Post Treatment Position chair  -MG     In Chair notified nsg;reclined;call light within reach;encouraged to call for assist;exit alarm on;legs elevated;heels elevated  -MG               User Key  (r) = Recorded By, (t) = Taken By, (c) = Cosigned By      Initials Name Provider Type    Chirstine Kathleen, PT Physical Therapist                   Outcome Measures       Row Name 10/23/24 1630 10/23/24 1420       How much help from another person do you currently need...    Turning from your back to your side while in flat bed without using bedrails? 4  -MG 3  -EB    Moving from lying on back to sitting on the side of a flat bed without bedrails? 3  -MG 3  -EB    Moving to and from a bed to a chair (including a wheelchair)? 3  -MG 4  -EB    Standing up from a chair using your arms (e.g., wheelchair, bedside chair)? 3  -MG 4  -EB    Climbing 3-5 steps with a railing? 2  -MG 4  -EB    To walk in hospital room? 3  -MG 4  fresh out of surgery, feet still numb  -EB    AM-PAC 6 Clicks Score (PT) 18  -MG 22  -EB              User Key  (r) = Recorded By, (t) = Taken By, (c) = Cosigned By      Initials Name Provider Type    Christine Kathleen, PT Physical Therapist    Suyapa Padilla RN Registered Nurse                                 Physical Therapy Education       Title: PT OT SLP Therapies (Done)       Topic: Physical Therapy (Done)       Point: Mobility training (Done)       Learning Progress Summary            Patient Acceptance, E,D,TB, VU,NR by  at 10/23/2024 1631                      Point: Home exercise program (Done)       Learning Progress Summary            Patient Acceptance, E,D,TB, VU,NR by  at 10/23/2024 1631                      Point: Body  mechanics (Done)       Learning Progress Summary            Patient Acceptance, E,D,TB, VU,NR by  at 10/23/2024 1631                      Point: Precautions (Done)       Learning Progress Summary            Patient Acceptance, E,D,TB, VU,NR by  at 10/23/2024 1631                                      User Key       Initials Effective Dates Name Provider Type Discipline     05/24/22 -  Christine Pal, PT Physical Therapist PT                  PT Recommendation and Plan  Planned Therapy Interventions (PT): balance training, bed mobility training, gait training, home exercise program, patient/family education, stretching, strengthening, stair training, neuromuscular re-education, ROM (range of motion), postural re-education, transfer training  Progress: improving  Outcome Evaluation: Pt is a 85 y/o F POD0 L TKA. Pt reports being independent at baseline without use of AD, owns a RW, rollator and BSC, and lives with her  in a house w/ 2 YUSRA, no HR, and plans for her dtr and gdtr to stay and assist. Today, pt was SBA for bed mobility, CGA for STS w/ RW and CG/Divine for ambulation of 14' w/ RW demoing typical post-op antalgic gait mechanics in a step-to pattern. Pt initially NWB on LLE d/t cont numbness, but able to progress to WBAT. No knee buckling, LOB, SOB, or dizziness. Pt was CGA via CNA for static standing w/ RW while this PT performed pericare/LE cleaning. Pt performed TKA protocol ther-ex for 5 reps demoing L knee AROM of 3-90deg. Pt edu on HEP, icing, positioning, sequencing and safety of ambulation, usage of gait belt, activity recs, falls prevention and energy conservation; they v/u. Pt will benefit from skilled PT services to address their post-op impaired gait, balance, ROM and endurance. SBAR w/ RN following session. Rec home w/ HH PT at [place].     Time Calculation:         PT Charges       Row Name 10/23/24 1631             Time Calculation    Start Time 1529  -MG      Stop Time 1605  -MG      Time  Calculation (min) 36 min  -MG      PT Received On 10/23/24  -MG      PT - Next Appointment 10/24/24  -MG      PT Goal Re-Cert Due Date 11/06/24  -MG         Time Calculation- PT    Total Timed Code Minutes- PT 24 minute(s)  -MG                User Key  (r) = Recorded By, (t) = Taken By, (c) = Cosigned By      Initials Name Provider Type    MG Christine Pal, PT Physical Therapist                  Therapy Charges for Today       Code Description Service Date Service Provider Modifiers Qty    51600893333 HC PT EVAL LOW COMPLEXITY 2 10/23/2024 Christine Pal, PT GP 1    81340292110 HC PT THERAPEUTIC ACT EA 15 MIN 10/23/2024 Christine Pal, PT GP 1    06890378338 HC PT THER PROC EA 15 MIN 10/23/2024 Christine Pal, PT GP 1            PT G-Codes  AM-PAC 6 Clicks Score (PT): 18  PT Discharge Summary  Anticipated Discharge Disposition (PT): home with home health, home with assist    Christine Pal PT  10/23/2024

## 2024-10-23 NOTE — ANESTHESIA POSTPROCEDURE EVALUATION
Patient: Jenniffer Dumont    Procedure Summary       Date: 10/23/24 Room / Location: [facility] OR [address] [place] [address] [facility]    Anesthesia Start: 0951 Anesthesia Stop: 1149    Procedure: TOTAL KNEE ARTHROPLASTY (Left: Knee) Diagnosis:     Surgeons: Mitchell Arevalo MD Provider: Chidi Weldon MD    Anesthesia Type: spinal ASA Status: 2            Anesthesia Type: spinal    Vitals  Vitals Value Taken Time   /82 10/23/24 1300   Temp 36.3 °C (97.4 °F) 10/23/24 1147   Pulse 63 10/23/24 1302   Resp 16 10/23/24 1245   SpO2 95 % 10/23/24 1302   Vitals shown include unfiled device data.        Post Anesthesia Care and Evaluation    Patient location during evaluation: bedside  Patient participation: complete - patient participated  Level of consciousness: awake and alert  Pain management: adequate    Airway patency: patent  Anesthetic complications: No anesthetic complications  PONV Status: controlled  Cardiovascular status: blood pressure returned to baseline and acceptable  Respiratory status: acceptable  Hydration status: acceptable

## 2024-10-23 NOTE — ANESTHESIA PREPROCEDURE EVALUATION
Anesthesia Evaluation     Patient summary reviewed and Nursing notes reviewed   history of anesthetic complications:  PONV  NPO Solid Status: > 8 hours  NPO Liquid Status: > 2 hours           Airway   Mallampati: II  TM distance: >3 FB  Neck ROM: full  Dental      Pulmonary    Cardiovascular     ECG reviewed    (+) hyperlipidemia      Neuro/Psych  GI/Hepatic/Renal/Endo    (+) GERD    Musculoskeletal     Abdominal    Substance History      OB/GYN          Other                          Anesthesia Plan    ASA 2     spinal     intravenous induction     Anesthetic plan, risks, benefits, and alternatives have been provided, discussed and informed consent has been obtained with: patient.        CODE STATUS:

## 2024-10-23 NOTE — ANESTHESIA PROCEDURE NOTES
Spinal Block      Patient reassessed immediately prior to procedure    Start Time: 10/23/2024 10:00 AM  Stop Time: 10/23/2024 10:05 AM  Preanesthetic Checklist  Completed: patient identified, IV checked, site marked, risks and benefits discussed, surgical consent, monitors and equipment checked, pre-op evaluation and timeout performed  Spinal Block Prep:  Sterile Tech:cap, gloves and sterile barriers  Patient Monitoring:EKG, continuous pulse oximetry and blood pressure monitoring    Spinal Block Procedure  Approach:midline  Guidance:landmark technique and palpation technique  Location:L4-L5  Needle Type:Ligia  Needle Gauge:25 G  Placement of Spinal needle event:cerebrospinal fluid aspirated  Paresthesia: no  Fluid Appearance:clear  Medications: bupivacaine (PF) (MARCAINE) 0.5 % injection - Spinal   1.8 mL - 10/23/2024 10:05:00 AM   Post Assessment  Patient Tolerance:patient tolerated the procedure well with no apparent complications  Complications no

## 2024-10-23 NOTE — OP NOTE
Mitchell Arevalo MD  LeftTOTAL KNEE ARTHROPLASTY  Procedure Note    Jenniffer Dumont  10/23/2024    Pre-op Diagnosis: Osteoarthritis Left knee primary generalized  Post-op Diagnosis:Same  Procedure: Left Total Knee Arthroplasty cpt 69323  Surgical Approach: Knee Medial Parapatellar  Surgeon:  Mitchell Arevalo MD  Assistant:    AISLINN Marc    The services of a first assist were necessary for performing the procedure safely and expeditiously.  The first assist was present for the entire duration of the case and helped with positioning, retraction and closure of the incision.    Anesthesia: Spinal with Block, Anesthesiologist: Chidi Weldon MD  CRNA: Amie Huffman CRNA  Staff: Circulator: Kenna Ramirez RN; John Arzate RN  Scrub Person: Norma Camarena RN; Laila Sloan CFA  Estimated Blood Loss: minimal  Specimens: * No orders in the log *  Drains: none  Complications: None    Components Utilized:   Implant Name Type Inv. Item Serial No.  Lot No. LRB No. Used Action   CMT BONE PALACOS R HI/VISC 1X40 - [id number] Implant CMT BONE PALACOS R HI/VISC 1X40  [facility] [id number] Left 1 Implanted   CMT BONE PALACOS R HI/VISC 1X40 - [id number] Implant CMT BONE PALACOS R HI/VISC 1X40  [facility] [id number] Left 1 Implanted   DEV CONTRL TISS STRATAFIX PDS PLS OS6 REV SZ1 18IN 45CM - [id number] Implant DEV CONTRL TISS STRATAFIX PDS PLS OS6 REV SZ1 18IN 45CM  [employer]  DIV OF J AND J [id number] Left 1 Implanted   DEV CONTRL TISS STRATAFIX SPIRAL MNCRYL UD 3/0 PLS 30CM - [id number] Implant DEV CONTRL TISS STRATAFIX SPIRAL MNCRYL UD 3/0 PLS 30CM  [employer]  DIV OF J AND J [id number] Left 1 Implanted   WAX BONE HEMO ITZEL 2.5G STRL - [id number] Implant WAX BONE HEMO ITZEL 2.5G STRL  [employer] [id number] Left 1 Implanted   COMP FEM SPHERIKA CMT SZ3/PLS LT - [id number] Implant COMP FEM SPHERIKA CMT SZ3/PLS LT  [employer] [facility] [id number] Left 1 Implanted   BASEPLT TIB/KN GMKSPHERE FIX TYLER  COCR SZ3 LT - [id number] Implant BASEPLT TIB/KN GMKSPHERE FIX TYLER COCR SZ3 LT  [employer] [id number] Left 1 Implanted   INSRT TIB/KN GMKSPHERE FLEX/E/CROSS UHMWPE VIT/E SZ3 10MM LT - [id number] Implant INSRT TIB/KN GMKSPHERE FLEX/E/CROSS UHMWPE VIT/E SZ3 10MM LT  [employer]ACT[employer] [facility] [id number] Left 1 Implanted         Indication for Procedure:   The patient is a 84 y.o. female presents today for a total knee arthroplasty procedure because of failure to conservatively manage the patient's pain for arthritis.  The patient was educated in risks of surgery that could include possible risk of infection, deep venous thrombosis, pulmonary embolism, fracture, neurovascular injury, leg length discrepancy, dislocation, possible persistent pain, need for additional surgeries, anesthetic risks, medical risks including heart attack and stroke, and death.  The discussion occurred in the office pre-operatively, and patient had the opportunity to ask questions, and concerns about the proposed surgery.  The patient also understood that medicine is not an exact science, and that outcomes of the surgical procedure may be less than desired. The patient wished to proceed.      Protocols for intravenous antibiotics and venous thrombosis were followed for this patient.  IV antibiotics were infused prior to surgery and will be discontinued within 24 hours of completion of the surgical procedure.  Thrombosis prophylaxis will be initiated within 24 hours of the completion of the surgical procedure.      Procedure:   After the patient was identified in the preoperative area, and the surgical site confirmed and marked, the patient was brought to the operating room on a stretcher.  They were placed supine on the operating room table and the above anesthetic was placed uneventfully.  A time-out procedure was performed.  The intravenous antibiotics infusion was completed.  A non-sterile tourniquet was placed on the operative thigh, and was prepped and draped  in the usual sterile fashion.  An Esmarch was to exsanguinate the limb, and the tourniquet was inflated.     A 10 blade scalpel was used to make a longitudinal incision from above the patella to medial to the tibial tubercle.  A medial parapatellar arthrotomy was performed with another 10 blade scalpel.  The fat pat was preserved and used as a medial retractor.  The medial joint line was elevated subperiosteally with electrocautery and a Velez elevator.   The patella was everted and a lateral denervation was performed as well as a lateral facet ostectomy.  The knee was then flexed and [place]'s line was drawn on the distal femur with electrocautery.  Then the drill was placed into the distal femur into the medullary canal.  The cartilage was removed from the medial femoral condyle.  The intramedullary distal femoral valgus cutting guide set to 5 degrees of femoral valgus was then placed into the medullary canal.  It was then pinned and the oscillating saw was used to make the osteotomy.    Then the extramedullary cutting guide was placed aligned with the tibial eminence superiorly and the tibial shaft distally, and the cut was aligned for a neutral cut along the mechanical axis.  The oscillating saw was then used to complete the osteotomy cuts.     The knee was then placed in extension with a 10 mm spacer block.  The extension gap was assessed for both symmetry medially and laterally as well as for overall level of resection both at the tibia and femur.  At this point, I was satisfied with the resections of both the tibia and the femur.  I proceeded on towards the femoral sizer.  A posterior referencing femoral sizer was set at 3 degrees of external rotation.  The femur was sized pins were placed and then an all-in-one cutting block was placed.  I confirmed appropriate anterior posterior dimensions using an patricia wing as well as medial lateral dimensions.  All resections were made using the sagittal saw as well as  a reciprocating saw for the trochlear cut.  Bone fragments were removed.  Medial and lateral periarticular osteophyte remnants were removed.   A laminar  was then placed medially and then laterally to remove the medial and lateral meniscus and the posterior osteophytes.  At this point the posterior cruciate ligament was resected.  The posterior medial and posterior lateral capsule was preserved.  A spacer block was placed and the knee at flexion and the knee was balanced in flexion for a medial pivot.  The knee was then placed in extension and confirmed to be balanced as well.  At this point the femoral trial was applied the tibial baseplate was inserted and the tibia was floated to assess rotation.  The knee was examined again was stable throughout range of motion.   The lug holes were drilled in the distal femur.  The tibia was drilled and broached in the typical fashion.  The trial components were then removed and the final implants were confirmed and opened.  The bone surfaces were then irrigated and dried.  Periarticular injection was placed in the posterior capsule Palacos cement was then mixed and placed on the cut bone surfaces and back side of the implants.  The implants were then impacted into place, and the trial polyethylene spacer was placed and the knee was placed into extension.  A stack of towels was placed under the ankle and the cement was allowed to harden.  Once the cement had cured the knee was again ranged and confirmed to be balanced and have excellent range of motion.  The definitive tibial insert was placed, which was size 10 mm. The tourniquet was then dropped.  Hemostasis was obtained.  The wound was then irrigated with the pulse lavage irrigation system with a 3 liter mixture of 0.35% Betadine and normal saline.  The local mixture was injected throughout the knee for postoperative pain control.   The arthrotomy was then closed..  #2 Ethibond sutures were used as stay sutures to  close the arthrotomy followed by #1 Stratafix PDS.  The deep dermis was closed with 2-0 Vicryl.  The skin was closed using 3-0 Monocryl as well as skin glue.. Sterile silver impregnated occlusive dressings were applied.   Sponge and needle counts were completed and were correct.  The patient was awakened from anesthetic and was returned to recovery in stable condition.    The patient will receive routine postoperative antibiotic prophylaxis with cefazolin.  The patient will receive aspirin for postoperative DVT prophylaxis in conjunction with sequential compression devices.    Mitchell Arevalo MD  Orthopaedic Surgeon    [facility] Orthopaedics and [facility] Medicine  [phone]          Date: 10/23/2024  Time: 11:24 EDT

## 2024-10-23 NOTE — ANESTHESIA PROCEDURE NOTES
Peripheral Block      Patient reassessed immediately prior to procedure    Patient location during procedure: holding area  Start time: 10/23/2024 8:37 AM  Stop time: 10/23/2024 8:39 AM  Reason for block: at surgeon's request and post-op pain management  Performed by  Anesthesiologist: Chidi Weldon MD  Preanesthetic Checklist  Completed: patient identified, IV checked, site marked, risks and benefits discussed, surgical consent, monitors and equipment checked, pre-op evaluation and timeout performed  Prep:  Pt Position: supine  Sterile barriers:cap, washed/disinfected hands, gloves, mask and alcohol skin prep  Prep: ChloraPrep  Patient monitoring: blood pressure monitoring, continuous pulse oximetry and EKG  Procedure    Sedation: yes  Performed under: local infiltration  Guidance:ultrasound guided    ULTRASOUND INTERPRETATION.  Using ultrasound guidance a 21 G gauge needle was placed in close proximity to the nerve, at which point, under ultrasound guidance anesthetic was injected in the area of the nerve and spread of the anesthesia was seen on ultrasound in close proximity thereto.  There were no abnormalities seen on ultrasound; a digital image was taken; and the patient tolerated the procedure with no complications. Images:still images obtained, printed/placed on chart    Laterality:left  Block Type:adductor canal block  Injection Technique:single-shot  Needle Type:echogenic and Tuohy  Needle Gauge:21 G  Resistance on Injection: none    Medications Used: dexamethasone (DECADRON) injection - Injection   4 mg - 10/23/2024 8:39:00 AM  ropivacaine (NAROPIN) 0.5 % injection - Injection   15 mL - 10/23/2024 8:39:00 AM      Post Assessment  Injection Assessment: negative aspiration for heme, no paresthesia on injection and incremental injection  Patient Tolerance:comfortable throughout block  Complications:no  Additional Notes  Ultrasound guidance used to visualize nerve anatomy, guide needle placement and  verify local anesthetic disbursement.       Performed by: Chidi Weldon MD

## 2024-10-23 NOTE — PLAN OF CARE
Goal Outcome Evaluation:  Plan of Care Reviewed With: patient        Progress: improving     Pt is a 85 y/o F POD0 L TKA. Pt reports being independent at baseline without use of AD, owns a RW, rollator and BSC, and lives with her  in a house w/ 2 YUSRA, no HR, and plans for her dtr and gdtr to stay and assist. Today, pt was SBA for bed mobility, CGA for STS w/ RW and CG/Divine for ambulation of 14' w/ RW demoing typical post-op antalgic gait mechanics in a step-to pattern. Pt initially NWB on LLE d/t cont numbness, but able to progress to WBAT. No knee buckling, LOB, SOB, or dizziness. Pt was CGA via CNA for static standing w/ RW while this PT performed pericare/LE cleaning. Pt performed TKA protocol ther-ex for 5 reps demoing L knee AROM of 3-90deg. Pt edu on HEP, icing, positioning, sequencing and safety of ambulation, usage of gait belt, activity recs, falls prevention and energy conservation; they v/u. Pt will benefit from skilled PT services to address their post-op impaired gait, balance, ROM and endurance. SBAR w/ RN following session. Rec home w/ HH PT at [place].       Anticipated Discharge Disposition (PT): home with home health, home with assist

## 2024-10-23 NOTE — NURSING NOTE
Pt arrived to unit around 1400. A/O x4, vitals stable. Pt has already voided. LBM today. B/L feet slightly numb still so pt was not able to walk. Left knee is ace wrapped, C,D,I. B/L SCD are on.    Pt has chronic respiratory illness' and wants respiratory consulted. Dr. Arevalo notified.     Orders placed for chest physiotherapy.

## 2024-10-24 ENCOUNTER — READMISSION MANAGEMENT (OUTPATIENT)
Dept: CALL CENTER | Facility: HOSPITAL | Age: 84
End: 2024-10-24
Payer: MEDICARE

## 2024-10-24 VITALS
BODY MASS INDEX: 23.64 KG/M2 | RESPIRATION RATE: 16 BRPM | WEIGHT: 125.22 LBS | TEMPERATURE: 98.4 F | SYSTOLIC BLOOD PRESSURE: 110 MMHG | DIASTOLIC BLOOD PRESSURE: 63 MMHG | HEIGHT: 61 IN | HEART RATE: 81 BPM | OXYGEN SATURATION: 95 %

## 2024-10-24 LAB
HCT VFR BLD AUTO: 29.8 % (ref 34–46.6)
HGB BLD-MCNC: 9.9 G/DL (ref 12–15.9)

## 2024-10-24 PROCEDURE — 63710000001 CETIRIZINE 10 MG TABLET: Performed by: ORTHOPAEDIC SURGERY

## 2024-10-24 PROCEDURE — 63710000001 PANTOPRAZOLE 40 MG TABLET DELAYED-RELEASE: Performed by: ORTHOPAEDIC SURGERY

## 2024-10-24 PROCEDURE — A9270 NON-COVERED ITEM OR SERVICE: HCPCS | Performed by: ORTHOPAEDIC SURGERY

## 2024-10-24 PROCEDURE — 94669 MECHANICAL CHEST WALL OSCILL: CPT

## 2024-10-24 PROCEDURE — 25010000002 KETOROLAC TROMETHAMINE PER 15 MG: Performed by: ORTHOPAEDIC SURGERY

## 2024-10-24 PROCEDURE — 63710000001 HYDROCODONE-ACETAMINOPHEN 7.5-325 MG TABLET: Performed by: ORTHOPAEDIC SURGERY

## 2024-10-24 PROCEDURE — 94799 UNLISTED PULMONARY SVC/PX: CPT

## 2024-10-24 PROCEDURE — 94664 DEMO&/EVAL PT USE INHALER: CPT

## 2024-10-24 PROCEDURE — 25010000002 CEFAZOLIN PER 500 MG: Performed by: ORTHOPAEDIC SURGERY

## 2024-10-24 PROCEDURE — 97110 THERAPEUTIC EXERCISES: CPT

## 2024-10-24 PROCEDURE — G0378 HOSPITAL OBSERVATION PER HR: HCPCS

## 2024-10-24 PROCEDURE — 85018 HEMOGLOBIN: CPT | Performed by: ORTHOPAEDIC SURGERY

## 2024-10-24 PROCEDURE — 63710000001 ASPIRIN 81 MG TABLET DELAYED-RELEASE: Performed by: ORTHOPAEDIC SURGERY

## 2024-10-24 PROCEDURE — 63710000001 MELOXICAM 7.5 MG TABLET: Performed by: ORTHOPAEDIC SURGERY

## 2024-10-24 PROCEDURE — 97530 THERAPEUTIC ACTIVITIES: CPT

## 2024-10-24 PROCEDURE — 85014 HEMATOCRIT: CPT | Performed by: ORTHOPAEDIC SURGERY

## 2024-10-24 RX ORDER — HYDROCODONE BITARTRATE AND ACETAMINOPHEN 5; 325 MG/1; MG/1
1 TABLET ORAL EVERY 6 HOURS PRN
Qty: 30 TABLET | Refills: 0 | Status: SHIPPED | OUTPATIENT
Start: 2024-10-24 | End: 2024-10-24

## 2024-10-24 RX ORDER — MELOXICAM 7.5 MG/1
7.5 TABLET ORAL DAILY
Qty: 30 TABLET | Refills: 0 | Status: SHIPPED | OUTPATIENT
Start: 2024-10-24 | End: 2024-10-24

## 2024-10-24 RX ORDER — MELOXICAM 7.5 MG/1
7.5 TABLET ORAL DAILY
Qty: 30 TABLET | Refills: 0 | Status: SHIPPED | OUTPATIENT
Start: 2024-10-24 | End: 2024-11-23

## 2024-10-24 RX ORDER — HYDROCODONE BITARTRATE AND ACETAMINOPHEN 5; 325 MG/1; MG/1
1-2 TABLET ORAL EVERY 6 HOURS PRN
Qty: 30 TABLET | Refills: 0 | Status: SHIPPED | OUTPATIENT
Start: 2024-10-24 | End: 2024-11-03

## 2024-10-24 RX ORDER — ASPIRIN 81 MG/1
81 TABLET ORAL 2 TIMES DAILY
Qty: 60 TABLET | Refills: 0 | Status: SHIPPED | OUTPATIENT
Start: 2024-10-24 | End: 2024-10-24

## 2024-10-24 RX ORDER — ASPIRIN 81 MG/1
81 TABLET ORAL 2 TIMES DAILY
Qty: 60 TABLET | Refills: 0 | Status: SHIPPED | OUTPATIENT
Start: 2024-10-24 | End: 2024-11-23

## 2024-10-24 RX ADMIN — PANTOPRAZOLE SODIUM 40 MG: 40 TABLET, DELAYED RELEASE ORAL at 08:15

## 2024-10-24 RX ADMIN — KETOROLAC TROMETHAMINE 15 MG: 15 INJECTION, SOLUTION INTRAMUSCULAR; INTRAVENOUS at 08:59

## 2024-10-24 RX ADMIN — CEFAZOLIN 2000 MG: 2 INJECTION, POWDER, FOR SOLUTION INTRAMUSCULAR; INTRAVENOUS at 02:07

## 2024-10-24 RX ADMIN — MELOXICAM 7.5 MG: 7.5 TABLET ORAL at 08:15

## 2024-10-24 RX ADMIN — BUDESONIDE AND FORMOTEROL FUMARATE DIHYDRATE 2 PUFF: 160; 4.5 AEROSOL RESPIRATORY (INHALATION) at 08:27

## 2024-10-24 RX ADMIN — CETIRIZINE HYDROCHLORIDE 10 MG: 10 TABLET ORAL at 08:15

## 2024-10-24 RX ADMIN — ISODIUM CHLORIDE 3 ML: 0.03 SOLUTION RESPIRATORY (INHALATION) at 08:22

## 2024-10-24 RX ADMIN — HYDROCODONE BITARTRATE AND ACETAMINOPHEN 1 TABLET: 7.5; 325 TABLET ORAL at 12:05

## 2024-10-24 RX ADMIN — KETOROLAC TROMETHAMINE 15 MG: 15 INJECTION, SOLUTION INTRAMUSCULAR; INTRAVENOUS at 02:22

## 2024-10-24 RX ADMIN — ALBUTEROL SULFATE 2.5 MG: 2.5 SOLUTION RESPIRATORY (INHALATION) at 08:22

## 2024-10-24 RX ADMIN — ASPIRIN 81 MG: 81 TABLET, COATED ORAL at 08:15

## 2024-10-24 NOTE — PLAN OF CARE
Goal Outcome Evaluation:  Plan of Care Reviewed With: patient        Progress: improving  Outcome Evaluation: Pt seen for PT tx this AM and tolerated the session well. Today, pt was SBA for STS to RW and SBA for ambulation of 80' w/ RW demoing a varying step-to to step-through pattern pending her pain level. Stairs were simulated via standing MIP x2 w/ BUE on RW, x3 w/ L HHA and this PT, w/ CG/Divine, demoing good foot clearance and recall of sequencing. Pt reports will have 2-3 people assisting w/ entry into her home. No knee buckling, LOB, SOB or dizziness. Pt performed TKA protocol ther-ex for 10 reps demoing L knee AROM 5-85deg. Cont edu on HEP, activity recs, positioning, icing, falls prevention, energy conservation, caf tsf, use of gait belt, sleeping position and safety/sequencing w/ gait/stairs and how her family/friends will assist. Pt reports no further questions or concerns and feels ready for DC. AMAURY w/ RN. PT will cont to follow.    Anticipated Discharge Disposition (PT): home with home health, home with assist

## 2024-10-24 NOTE — DISCHARGE SUMMARY
Discharge Summary    Date of Admission: 10/23/2024  7:08 AM  Date of Discharge:  10/24/2024    Discharge Diagnosis:   Total knee replacement status, left [Z96.652]      PMHX:   Past Medical History:   Diagnosis Date    Arthritis     Bronchiectasis     FOLLOWED BY [facility]    GERD (gastroesophageal reflux disease)     History of bronchoscopy     History of melanoma     History of tachycardia     MEDICATION RELATED - NO PROBLEMS SINCE    HL (hearing loss) ?    Hyperlipidemia     MARIMAA (mycobacterium avium-intracellulare)     FOLLOWED BY [facility]    Osteopenia ?    PONV (postoperative nausea and vomiting)     Scoliosis        Discharge Disposition  Home-Health Care [facility]    Procedures Performed  Procedure(s):  TOTAL KNEE ARTHROPLASTY       Indication for Admission  Patient is a 84 y.o. female admitted after undergoing the above surgical procedure. They were admitted for post-operative pain control, medical management and physical therapy.  They progressed with physical therapy.    They were deemed stable for discharge.      Consults:   Consults       No orders found for last 30 day(s).            Discharge Instructions:  Patient is weight bearing as tolerated on the operative leg.  Patient is to progress range of motion and ambulation as tolerated.  Use walker as needed for stability and gait.  May progress to cane as tolerated.  The dressing should be left on knee until post-operative day 7, and then removed.  Dressing is waterproof. Patient may shower 3 days after the operation with the dressing in place. Replace the dressing if it becomes wet or saturated. Use compression stockings for leg swelling.  Patient will follow-up in the office in 3 weeks.  Call the office at [phone] for any questions or concerns.      Discharge Medications     Discharge Medications        New Medications        Instructions Start Date   aspirin 81 MG EC tablet   81 mg, Oral, 2 Times Daily      HYDROcodone-acetaminophen  5-325 MG per tablet  Commonly known as: NORCO   1-2 tablets, Oral, Every 6 Hours PRN      meloxicam 7.5 MG tablet  Commonly known as: Mobic   7.5 mg, Oral, Daily      naloxone 4 MG/0.1ML nasal spray  Commonly known as: NARCAN   Call 911. Don't prime. [place] in 1 nostril for overdose. Repeat in 2-3 minutes in other nostril if no or minimal breathing/responsiveness.             Changes to Medications        Instructions Start Date   albuterol (2.5 MG/3ML) 0.083% nebulizer solution  Commonly known as: PROVENTIL  What changed:   when to take this  additional instructions   2.5 mg, Nebulization, Every 6 Hours PRN      albuterol sulfate  (90 Base) MCG/ACT inhaler  Commonly known as: PROVENTIL HFA;VENTOLIN HFA;PROAIR HFA  What changed: Another medication with the same name was changed. Make sure you understand how and when to take each.   2 puffs, Inhalation, Every 4 Hours PRN             Continue These Medications        Instructions Start Date   azithromycin 250 MG tablet  Commonly known as: ZITHROMAX   250 mg, Oral, 3 Times Weekly, MONDAY, WEDNESDAY, FRIDAY       CALCIUM 500 + D PO   1 tablet, Daily      COQ-10 PO   400 mg, Every Morning      esomeprazole 40 MG capsule  Commonly known as: nexIUM   40 mg, Oral, Every Morning Before Breakfast      ETHAMBUTOL HCL PO   3 Times Weekly      fexofenadine 180 MG tablet  Commonly known as: ALLEGRA   180 mg, Oral, Daily PRN      fluticasone 50 MCG/ACT nasal spray  Commonly known as: FLONASE   2 sprays, Nasal, Daily PRN      MAGNESIUM PO   1 tablet, Daily      RIFAMPIN PO   600 mg, 3 Times Weekly      Sodium Chloride 3.5 % nebulizer solution   4 mL, 2 Times Daily      Symbicort 160-4.5 MCG/ACT inhaler  Generic drug: budesonide-formoterol   2 puffs, 2 Times Daily - RT      Vitamin C 500 MG capsule   500 mg, Every Morning             Stop These Medications      acetaminophen 650 MG 8 hr tablet  Commonly known as: TYLENOL     TURMERIC PO              Discharge Diet: Regular  diet    Activity at Discharge: Weight bearing as tolerated, activity as tolerated    Follow-up Appointments  Future Appointments   Date Time Provider Department Center   11/25/2024  1:30 PM Su Gabriel MD [facility]              10/24/24,  14:36 EDT    Mitchell Arevalo MD  Orthopaedic Surgeon    [facility] Orthopaedics and Sports Medicine  [phone]

## 2024-10-24 NOTE — PROGRESS NOTES
Mitchell Arevalo MD     Orthopedic Progress Note    Subjective :   Pain is well-controlled she is up in a chair today.  She did work with respiratory therapy overnight.  Denies any respiratory symptoms at present other than baseline    Objective :    Vital signs in last 24 hours:  Temp:  [97.4 °F (36.3 °C)-98.1 °F (36.7 °C)] 97.5 °F (36.4 °C)  Heart Rate:  [63-90] 80  Resp:  [13-18] 18  BP: (113-160)/() 127/77  Vitals:    10/23/24 2030 10/23/24 2127 10/24/24 0200 10/24/24 0652   BP:  113/67 123/77 127/77   BP Location:  Left arm Left arm Left arm   Patient Position:  Lying Lying Lying   Pulse: 66 74 77 80   Resp: 18 18 18 18   Temp:  97.5 °F (36.4 °C) 98.1 °F (36.7 °C) 97.5 °F (36.4 °C)   TempSrc:  Oral Oral Oral   SpO2: 98% 96% 98% 97%   Weight:       Height:           PHYSICAL EXAM:  Patient is calm, in no acute distress, awake and oriented x 3.  Dressing clean, dry and intact.  No signs of infection.  Swelling is appropriate.  Ecchymosis is appropriate in amount.  Homans test is negative.  Patient is neurovascularly intact distally.  Ankle plantarflexion dorsiflexion is intact    LABS:  Results from last 7 days   Lab Units 10/24/24  0430   HEMOGLOBIN g/dL 9.9*   HEMATOCRIT % 29.8*               Intake/Output                   10/23/24 0701 - 10/24/24 0700     [id number] 9037-9164 Total              Intake    P.O.  240  1360 1600    I.V.  900  -- 900    Total Intake 1140 1360 2500       Output    Urine  400  -- 400    Total Output 400 -- 400             ASSESSMENT:  Status post left total knee arthroplasty postop day #1    Plan:  Continue Physical Therapy, weightbearing as tolerated left lower extremity range of motion as tolerated.  Continue SCDs, Continue aspirin for DVT prophylaxis.  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  Disposition: Plan for discharge to home today    Mitchell Arevalo MD    Date: 10/24/2024  Time: 07:41 EDT    Mitchell Arevalo MD  Orthopaedic Surgeon  [facility]  What Type Of Note Output Would You Prefer (Optional)?: Bullet Format How Severe Is Your Acne?: mild Orthopaedics and Sports Medicine             Is This A New Presentation, Or A Follow-Up?: Acne

## 2024-10-24 NOTE — DISCHARGE INSTRUCTIONS
Dr. Mitchell Arevalo   Total Knee Replacement Discharge Instructions:  Office Phone Number: [phone]    I. ACTIVITIES:  1. Exercises:  Complete exercise program as taught by the hospital physical therapist 2 times per day.  You may wean off the walker to a cane when directed by the physical therapist.  Exercise program will be advanced by the physical therapist  During the day be up ambulating every 2 hours (while awake) for short distances  Complete the ankle pump exercises at least 10 times per hour (while awake)  Elevate legs most of the day the first week post operatively and thereafter elevate legs when in bed and for at least 30 minutes during the day. Use cold packs 20-30 minutes approximately 5 times per day. This should be done before and after completing your exercises and at any time you are experiencing pain/ stiffness in your operative extremity.      2. Activities of Daily Living:  No tub baths, hot tubs, or swimming pools for 4 weeks  The tan or skin colored dressing is on your knee is waterproof.  You may shower without covering the dressing beginning 3 days after the operation.  After 7 days you may remove the dressing.  If the dressing becomes saturated prior to day 7, it may be changed.  After dressing removal, do not scrub or rub the incision. Allow skin glue to fall off over the next few weeks.  After the dressing is removed, simply let the water run over the incision and pat dry.    II. Restrictions  Follow any movement restrictions that was discussed with you by either Dr. Arevalo or the physical therapist.     Avoid kneeling on the knee that was operated on  Dr. Arevalo will discuss with you when you will be able to drive again at your first post-op appointment.  Weight bearing is as tolerated.  First week stay inside on even terrain. May go up and down stairs one stair at a time utilizing the hand rail.  Once you feel confident, you may venture outside.    Exercises:  Perform quad sets  as instructed by the therapist at least 3 times a day  Perform leg hangs with you heel placed on a chair or footrest and allow you knee to stretch towards a more straightened position several times a day  Work on knee flexion exercises at least three times daily.  Avoid placing a pillow under your knee as this will cause your knee to become more stiff throughout the recovery process.    III. Precautions:  Everyone that comes near you should wash their hands  No elective dental, genital-urinary, or colon procedures or surgical procedures for 12 weeks after surgery unless absolutely necessary.   If dental work or surgical procedure is deemed absolutely necessary within 12 weeks of surgery, you will need to contact Dr. Arevalo's office as you will need to take antibiotics 1 hour prior to any dental work (including teeth cleanings).  Dr. Arevalo will prescribe prophylactic antibiotics for all dental procedures for one year  as a precautionary measure to minimize risk of infection.  If you are a diabetic or take immunosuppressive medication, you may have to take prophylactic antibiotics the remainder of your life before dental work.    Avoid sick people. If you must be around someone who is ill, they should wear a mask.  Avoid visits to the Emergency Room or Urgent Care unless you are having a life threatening event.   If you have leg swelling you may wear leg compression stocking.   Stockings are to be placed on in the morning and removed at night. Monitor the stockings to ensure that any swelling is not causing the stockings to become too tight. In this case, remove stockings immediately.    IV. INCISION CARE:  Dr. Arevalo takes great care in closing your incision to give you the best opportunity for a healthy incision with minimal scarring. He places sutures below the skin surface that will eventually dissolve.  The incision is then covered with a skin glue which makes the incision water tight, and minimizes bleeding  onto the dressing.  No staples are used.  Occasionally one of the buried stitches may come to the skin surface and may need to be removed.  Please resist the temptation of removing the stitch by yourself.   will be happy to remove it for you.  Bruising around the incision and thigh is normal and to be expected.  Please keep dressing in place at least until post-op day 7. You may remove and replace dressing before day 7 if the dressing begins to fall off or becomes saturated. Wash your hands and under your finger nails prior to dressing changes.  After day 7 as long as incision is dry and intact, you may leave the dressing off and open to air.    If dressing must be changed, utilize dry gauze and paper tape. Avoid touching the side of the gauze that goes against the incision with your hands.  No creams or ointments to the incision until permission given by Dr. Arevalo.  Do not touch or pick at the incision, or try to remove any sutures or skin glue.  Check dressing every day and notify surgeon immediately if any of the following signs or symptoms are noted:  Increase in redness  Increase in swelling of the entire extremity that does not go away with elevation.  Notify office that you may have a blood clot.    Drainage oozing from the incision  Pulling apart of the edges of the incision  Increase in overall body temperature (greater than 100.5 degrees)    V. Medications:   1. Anticoagulants: You will be discharged on an anticoagulant. This is a prophylactic medication that helps prevent blood clots during your post-operative period. The type and length of dosage varies based on your individual needs, procedure performed, and Dr. Arevalo's preference.  While taking the anticoagulant, you should avoid taking any additional aspirin than what is prescribed.   Notify surgeon immediately if any darya bleeding is noted in the urine, stool, emesis, or from the nose or the incision. Blood in the stool will often appear  as black rather than red. Blood in urine may appear as pink. Blood in emesis may appear as brown/black like coffee grounds.  You will need to apply pressure for longer periods of time to any cuts or abrasions to stop bleeding  Avoid alcohol while taking anticoagulants.    2. Stool Softeners: You will be at greater risk of constipation after surgery due to being less mobile and the pain medications.   Take stool softeners as instructed by your surgeon while on pain medications. Over the counter Colace 100 mg 1-2 capsules twice daily.   If stools become too loose or too frequent, please decreases the dosage or stop the stool softener.  If constipation occurs despite use of stool softeners, you are to continue the stool softeners and add a laxative (Milk of Magnesia 1 ounce daily as needed).  If no bowel movement occurs past 3 days, then purchase Magnesium citrate and drink 1/2 bottle every 8 hours (on ice tastes better) until success. If no bowel movement by post-operative day 5 please call Dr. Arevalo office for further instructions.   You may need to decrease or stop your pain medications if bowel movements to not occur.     Drink plenty of fluids, and eat fruits and vegetables during your recovery time.    3. Pain Medications utilized after surgery are narcotics and the law requires that the following information be given to all patients that are prescribed narcotics:  CLASSIFICATION: Pain medications are called Opioids and are narcotics  LEGALITIES: It is illegal to share narcotics with others and to drive within 24 hours of taking narcotics  POTENTIAL SIDE EFFECTS: Potential side effects of opioids include: nausea, vomiting, itching, dizziness, drowsiness, dry mouth, constipation, and difficulty urinating.  POTENTIAL ADVERSE EFFECTS:   Opioid tolerance can develop with use of pain medications and this simply means that it requires more and more of the medication to control pain; however, this is seen more in  "patients that use opioids for longer periods of time.  Opioid dependence can develop with use of Opioids and this simply means that to stop the medication can cause withdrawal symptoms; however, this is seen with patients that use Opioids for longer periods of time.  Opioid addiction can develop with use of Opioids and the incidence of this is very unlikely in patients who take the medications as ordered and stop the medications as instructed.  Opioid overdose can be dangerous, but is unlikely when the medication is taken as ordered and stopped when ordered. It is important not to mix opioids with alcohol or with and type of sedative such as Benadryl as this can lead to over sedation and respiratory difficulty.  DOSAGE:   Pain medications will need to be taken consistently for the first few days to decrease pain and promote adequate pain relief and participation in physical therapy.  After the initial surgical pain begins to resolve, you may begin to decrease the pain medication. By the end of 6 weeks, you should be off of pain medications except for before physical therapy or to help with pain when attempting to fall asleep.  Pain medications will be tapered to lesser dosages as you are further from your surgical day.  No pain medications will be provided after 3 months from surgery.     Refills will not be given by the office during evening hours, or weekends.  To seek refills on pain medications during the post-operative period, you must call the office 48 hours in advance to request the refill. The office will then notify you when to  the prescription. DO NOT wait until you are out of the medication to request a refill.  They can not be \"called in\" to the pharmacy.      How to Wean Off Pain Medication:   As you begin to feel better, gradually wean off the narcotic pain medication and begin to use it only for breakthrough pain.  Gradually reduce the total number of pills you take each day.  This can be " done by taking fewer pills at a time or by increasing the amount of time between each pill.    For example, if you were taking 2 pills every 6 hours you would be taking a total of 8 pills per day.  Reduce this to 6 pills per day, then 4-5 and so on.  This can be done by taking 1 pill at a time instead of 2, or by taking the pills every 8 hours instead of every 6.    As you begin to wean from the narcotic pain medication, begin substituting with over the counter tylenol when you are not taking the narcotic.  Limit total tylenol dosage to less than 4 grams per day.    V. FOLLOW-UP VISITS:  You will need to follow up in the office with Dr. Arevalo at 3 weeks.  Please call [phone] if you need to confirm or reschedule your appointment time.   If you have any concerns or suspected complications prior to your follow up visit, please call your surgeons office. Do not wait until your appointment time if you suspect complications. These will need to be addressed in the office promptly.

## 2024-10-24 NOTE — PLAN OF CARE
Problem: Adult Inpatient Plan of Care  Goal: Plan of Care Review  Outcome: Progressing     Problem: Adult Inpatient Plan of Care  Goal: Patient-Specific Goal (Individualized)  Outcome: Progressing     Problem: Adult Inpatient Plan of Care  Goal: Absence of Hospital-Acquired Illness or Injury  Outcome: Progressing     Problem: Adult Inpatient Plan of Care  Goal: Optimal Comfort and Wellbeing  Outcome: Progressing  Intervention: Provide Person-Centered Care  Recent Flowsheet Documentation  Taken 10/23/2024 1959 by Katie Chino, RN  Trust Relationship/Rapport:   care explained   choices provided   questions answered   thoughts/feelings acknowledged   Goal Outcome Evaluation:

## 2024-10-24 NOTE — PLAN OF CARE
Goal Outcome Evaluation:      Patient is POD #1 for a left total knee arthroplasty.  Patient is alert x4.  Dressing is dry and iintact.  Norco, tramadol, and scheduled toradol were given for pain management.  Worked with physical therapy this am.  Patient to discharge to home.  [facility] to follow.

## 2024-10-24 NOTE — OUTREACH NOTE
Prep Survey      Flowsheet Row Responses   [facility] facility patient discharged from? [facility]   Is LACE score < 7 ? Yes   Eligibility [facility]   Date of Admission 10/23/24   Date of Discharge 10/24/24   Discharge Disposition Home-Health Care Sv   Discharge diagnosis TOTAL KNEE ARTHROPLASTY   Does the patient have one of the following disease processes/diagnoses(primary or secondary)? Total Joint Replacement   Does the patient have Home health ordered? Yes   What is the Home health agency?  [facility]   Is there a DME ordered? No   Prep survey completed? Yes            DORCAS ARIAS - Registered Nurse

## 2024-10-24 NOTE — THERAPY TREATMENT NOTE
Patient Name: Jenniffer Dumont  : 1940    MRN: [id number]                              Today's Date: 10/24/2024       Admit Date: 10/23/2024    Visit Dx:     ICD-10-CM ICD-9-CM   1. Total knee replacement status, left  Z96.652 V43.65     Patient Active Problem List   Diagnosis    Bronchiectasis with (acute) exacerbation    Cough    HLD (hyperlipidemia)    MARIAMA (mycobacterium avium-intracellulare)    Allergic sinusitis    History of melanoma    Gastroesophageal reflux disease    Hyperglycemia    Pneumonia due to infectious organism    Acute hyponatremia    Chronic pain of left knee    Osteopenia of left hip    Total knee replacement status, left     Past Medical History:   Diagnosis Date    Arthritis     Bronchiectasis     FOLLOWED BY [facility]    GERD (gastroesophageal reflux disease)     History of bronchoscopy     History of melanoma     History of tachycardia     MEDICATION RELATED - NO PROBLEMS SINCE    HL (hearing loss) ?    Hyperlipidemia     MARIAMA (mycobacterium avium-intracellulare)     FOLLOWED BY [facility]    Osteopenia ?    PONV (postoperative nausea and vomiting)     Scoliosis      Past Surgical History:   Procedure Laterality Date    APPENDECTOMY      BRONCHOSCOPY      BUNIONECTOMY Left     CATARACT EXTRACTION, BILATERAL      COLONOSCOPY      ENDOSCOPY      HYSTERECTOMY      ORIF WRIST FRACTURE Left     SKIN CANCER EXCISION      MELANOMA REMOVAL  - LEG    TONSILLECTOMY      TOTAL KNEE ARTHROPLASTY Left 10/23/2024    Procedure: TOTAL KNEE ARTHROPLASTY;  Surgeon: Mitchell Arevalo MD;  [place] [facility] [place] [facility] [place]  Service: [place]opedics;  Laterality: Left;      General Information       Row Name 10/24/24 1324          Physical Therapy Time and Intention    Document Type therapy note (daily note)  -MG     Mode of Treatment individual therapy;physical therapy  -MG       Row Name 10/24/24 3283          General Information    Patient Profile Reviewed yes  -MG     Existing Precautions/Restrictions no  known precautions/restrictions  -MG     Barriers to Rehab none identified  -MG       Row Name 10/24/24 1320          Cognition    Orientation Status (Cognition) oriented x 4  -MG       Row Name 10/24/24 1320          Safety Issues/Impairments Affecting Functional Mobility    Safety Issues Affecting Function (Mobility) insight into deficits/self-awareness  -MG     Impairments Affecting Function (Mobility) balance;endurance/activity tolerance;pain;range of motion (ROM);strength  -MG     Comment, Safety Issues/Impairments (Mobility) Gait belt and non-skid socks donned.  -MG               User Key  (r) = Recorded By, (t) = Taken By, (c) = Cosigned By      Initials Name Provider Type    MG Christine Pal, PT Physical Therapist                   Mobility       Row Name 10/24/24 1320          Sit-Stand Transfer    Sit-Stand [place] (Transfers) standby assist;verbal cues  -MG     Assistive Device (Sit-Stand Transfers) walker, front-wheeled  -MG     Comment, (Sit-Stand Transfer) x2 from chair. Cues for hand placement and sequencing.  -MG       Row Name 10/24/24 1320          Gait/Stairs (Locomotion)    [place] Level (Gait) standby assist;verbal cues  -MG     Assistive Device (Gait) walker, front-wheeled  -MG     Distance in Feet (Gait) 80  -MG     Deviations/Abnormal Patterns (Gait) gait speed decreased;antalgic;stride length decreased  -MG     Bilateral Gait Deviations forward flexed posture;heel strike decreased  -MG     Left Sided Gait Deviations weight shift ability decreased  -MG     Comment, (Gait/Stairs) Step-to w/ intermittent step-through pattern. Pattern limited by pain. Cues for sequencing, posture and heel-toe mechanics. No buckling, LOB, dizziness or SOB.  -MG       Row Name 10/24/24 1320          Mobility    Extremity Weight-bearing Status left lower extremity  -MG     Left Lower Extremity (Weight-bearing Status) weight-bearing as tolerated (WBAT)  -MG               User Key  (r) = Recorded By, (t) =  Taken By, (c) = Cosigned By      Initials Name Provider Type    Christine Kathleen, PT Physical Therapist                   Obj/Interventions       Row Name 10/24/24 1321          Motor Skills    Therapeutic Exercise other (see comments)  TKA protocol x10. Stairs were simulated via standing MIP x2 w/ BUE on RW, x3 w/ L HHA and this PT, w/ CG/Divine, demoing good foot clearance and recall of sequencing.  -MG       Row Name 10/24/24 1321          Balance    Comment, Balance No knee buckling or overt LOB. Static standing w/ RN in bathroom on arrival.  -MG       Row Name 10/24/24 1321          Sensory Assessment (Somatosensory)    Sensory Assessment (Somatosensory) sensation intact  -MG               User Key  (r) = Recorded By, (t) = Taken By, (c) = Cosigned By      Initials Name Provider Type    Christine Kathleen, PT Physical Therapist                   Goals/Plan       Row Name 10/24/24 1326          Bed Mobility Goal 1 (PT)    Progress/Outcomes (Bed Mobility Goal 1, PT) goal met  -MG       Row Name 10/24/24 1326          Transfer Goal 1 (PT)    Progress/Outcome (Transfer Goal 1, PT) goal met  -MG       Row Name 10/24/24 1326          Gait Training Goal 1 (PT)    Progress/Outcome (Gait Training Goal 1, PT) goal met  -MG       Row Name 10/24/24 1326          Stairs Goal 1 (PT)    Progress/Outcome (Stairs Goal 1, PT) goal met  -MG               User Key  (r) = Recorded By, (t) = Taken By, (c) = Cosigned By      Initials Name Provider Type    Christine Kathleen, PT Physical Therapist                   Clinical Impression       Row Name 10/24/24 1323          Pain    Pretreatment Pain Rating 7/10  -MG     Posttreatment Pain Rating 8/10  -MG     Pain Location knee  -MG     Pain Side/Orientation left  -MG     Pain Management Interventions activity modification encouraged;exercise or physical activity utilized;cold applied;premedicated for activity  -MG     Response to Pain Interventions activity level improved;activity  participation with tolerable pain;mobility function improved  -MG       Row Name 10/24/24 1323          Plan of Care Review    Plan of Care Reviewed With patient  -MG     Progress improving  -MG     Outcome Evaluation Pt seen for PT tx this AM and tolerated the session well. Today, pt was SBA for STS to RW and SBA for ambulation of 80' w/ RW demoing a varying step-to to step-through pattern pending her pain level. Stairs were simulated via standing MIP x2 w/ BUE on RW, x3 w/ L HHA and this PT, w/ CG/Divine, demoing good foot clearance and recall of sequencing. Pt reports will have 2-3 people assisting w/ entry into her home. No knee buckling, LOB, SOB or dizziness. Pt performed TKA protocol ther-ex for 10 reps demoing L knee AROM 5-85deg. Cont edu on HEP, activity recs, positioning, icing, falls prevention, energy conservation, caf tsf, use of gait belt, sleeping position and safety/sequencing w/ gait/stairs and how her family/friends will assist. Pt reports no further questions or concerns and feels ready for DC. SBAR w/ RN. PT will cont to follow.  -MG       Row Name 10/24/24 1323          Therapy Assessment/Plan (PT)    Rehab Potential (PT) good  -MG     Criteria for Skilled Interventions Met (PT) yes;meets criteria  -MG     Therapy Frequency (PT) daily  -MG       Row Name 10/24/24 1323          Vital Signs    O2 Delivery Pre Treatment room air  -MG     O2 Delivery Intra Treatment room air  -MG     O2 Delivery Post Treatment room air  -MG     Pre Patient Position Standing  -MG     Intra Patient Position Standing  -MG     Post Patient Position Sitting  -MG       Row Name 10/24/24 1323          Positioning and Restraints    Pre-Treatment Position bathroom  -MG     Post Treatment Position chair  -MG     In Chair notified nsg;reclined;call light within reach;encouraged to call for assist;legs elevated;LLE elevated  -MG               User Key  (r) = Recorded By, (t) = Taken By, (c) = Cosigned By      Initials Name  Provider Type    Christine Kathleen, PT Physical Therapist                   Outcome Measures       Row Name 10/24/24 1326 10/24/24 0815       How much help from another person do you currently need...    Turning from your back to your side while in flat bed without using bedrails? 4  -MG 4  -DD    Moving from lying on back to sitting on the side of a flat bed without bedrails? 3  -MG 3  -DD    Moving to and from a bed to a chair (including a wheelchair)? 3  -MG 3  -DD    Standing up from a chair using your arms (e.g., wheelchair, bedside chair)? 3  -MG 3  -DD    Climbing 3-5 steps with a railing? 3  -MG 2  -DD    To walk in hospital room? 3  -MG 3  -DD    AM-PAC 6 Clicks Score (PT) 19  -MG 18  -DD              User Key  (r) = Recorded By, (t) = Taken By, (c) = Cosigned By      Initials Name Provider Type    MG Christine Pal, PT Physical Therapist    Tracy Hensley, RN Registered Nurse                                 Physical Therapy Education       Title: PT OT SLP Therapies (Resolved)       Topic: Physical Therapy (Resolved)       Point: Mobility training (Resolved)       Learning Progress Summary            Patient Acceptance, E,TB,D, VU,DU by MG at 10/24/2024 1326    Acceptance, E,D,TB, VU,NR by MG at 10/23/2024 1631                      Point: Home exercise program (Resolved)       Learning Progress Summary            Patient Acceptance, E,TB,D, VU,DU by MG at 10/24/2024 1326    Acceptance, E,D,TB, VU,NR by MG at 10/23/2024 1631                      Point: Body mechanics (Resolved)       Learning Progress Summary            Patient Acceptance, E,TB,D, VU,DU by MG at 10/24/2024 1326    Acceptance, E,D,TB, VU,NR by MG at 10/23/2024 1631                      Point: Precautions (Resolved)       Learning Progress Summary            Patient Acceptance, E,TB,D, VU,DU by MG at 10/24/2024 1326    Acceptance, E,D,TB, VU,NR by MG at 10/23/2024 1631                                      User Key       Initials Effective  Dates Name Provider Type Discipline     05/24/22 -  Christine Pal PT Physical Therapist PT                  PT Recommendation and Plan  Planned Therapy Interventions (PT): balance training, bed mobility training, gait training, home exercise program, patient/family education, stretching, strengthening, stair training, neuromuscular re-education, ROM (range of motion), postural re-education, transfer training  Progress: improving  Outcome Evaluation: Pt seen for PT tx this AM and tolerated the session well. Today, pt was SBA for STS to RW and SBA for ambulation of 80' w/ RW demoing a varying step-to to step-through pattern pending her pain level. Stairs were simulated via standing MIP x2 w/ BUE on RW, x3 w/ L HHA and this PT, w/ CG/Divine, demoing good foot clearance and recall of sequencing. Pt reports will have 2-3 people assisting w/ entry into her home. No knee buckling, LOB, SOB or dizziness. Pt performed TKA protocol ther-ex for 10 reps demoing L knee AROM 5-85deg. Cont edu on HEP, activity recs, positioning, icing, falls prevention, energy conservation, caf tsf, use of gait belt, sleeping position and safety/sequencing w/ gait/stairs and how her family/friends will assist. Pt reports no further questions or concerns and feels ready for DC. AMAURY w/ RN. PT will cont to follow.     Time Calculation:         PT Charges       Row Name 10/24/24 1327             Time Calculation    Start Time 1032  -MG      Stop Time 1104  -MG      Time Calculation (min) 32 min  -MG      PT Received On 10/24/24  -MG      PT - Next Appointment 10/25/24  -MG                User Key  (r) = Recorded By, (t) = Taken By, (c) = Cosigned By      Initials Name Provider Type     Christine Pal, PT Physical Therapist                  Therapy Charges for Today       Code Description Service Date Service Provider Modifiers Qty    89097620709  PT EVAL LOW COMPLEXITY 2 10/23/2024 Christine Pal, PT GP 1    [id number]  PT THERAPEUTIC ACT EA  15 MIN 10/23/2024 Christine Pal, PT GP 1    43129389925 HC PT THER PROC EA 15 MIN 10/23/2024 Christine Pal, PT GP 1    20174930868 HC PT THERAPEUTIC ACT EA 15 MIN 10/24/2024 Christine Pal, PT GP 1    55037654983 HC PT THER PROC EA 15 MIN 10/24/2024 Christine Pal, PT GP 1            PT G-Codes  AM-PAC 6 Clicks Score (PT): 19  PT Discharge Summary  Anticipated Discharge Disposition (PT): home with home health, home with assist    Christine Pal, PT  10/24/2024

## 2024-10-25 ENCOUNTER — TRANSITIONAL CARE MANAGEMENT TELEPHONE ENCOUNTER (OUTPATIENT)
Dept: CALL CENTER | Facility: HOSPITAL | Age: 84
End: 2024-10-25
Payer: MEDICARE

## 2024-10-25 NOTE — OUTREACH NOTE
Call Center TCM Note      Flowsheet Row Responses   [facility] patient discharged from? [place]   Does the patient have one of the following disease processes/diagnoses(primary or secondary)? Total Joint Replacement   Joint surgery performed? Knee   TCM attempt successful? Yes  [VR- ]   Call start time 0932   Call end time 0947   Has the patient been back in either the hospital or Emergency Department since discharge? No   Discharge diagnosis TOTAL KNEE ARTHROPLASTY   Does the patient have all medications related to this admission filled (includes all antibiotics, pain medications, etc.) Yes   Is the patient taking all medications as directed (includes completed medication regime)? Yes   Is the patient able to teach back alternate methods of pain control? Ice   Comments Pt has post op appt scheduled with ortho surgeon and has call out to their office R/T pain and swelling.   Does the patient have an appointment with their PCP within 7-14 days of discharge? Other   Nursing Interventions Routed TCM call to PCP office   What is the Home health agency?  [facility]   Has home health visited the patient within 72 hours of discharge? Unsure   Home health interventions Called Home Health agency   Home health comments left message with [facility] to call pt with dates   Psychosocial issues? No   Has the patient began therapy sessions (either in the home or as an out patient)? No   Has the patient fallen since discharge? No   Did the patient receive a copy of their discharge instructions? Yes   Nursing interventions Reviewed instructions with patient   What is the patient's perception of their functional status since discharge? Worsening  [Reports she is having alot of pain and nausea - pain med is not helping and making her sick.]   Is the patient able to teach back signs and symptoms of infection? Temp >100.4 for 24h or longer, Incisional drainage, Increased swelling or redness around incision (not  associated with surgical edema), Severe discomfort or pain   Is the patient able to teach back how to prevent infection? Shower only as directed by surgeon, No tub baths, hot tub or swimming   Is the patient able to teach back signs and symptoms of DVT? Redness in calf, Swelling in calf, Area hot to touch, Severe pain in calf   Is the patient/caregiver able to teach back the hierarchy of who to call/visit for symptoms/problems? PCP, Specialist, Home health nurse, Urgent Care, ED, 911 Yes   TCM call completed? Yes   Wrap up additional comments Post op 11/11/24. Pt reports that she is having alot of pain and pain med is not working  it also is making her sick to her stomach. Pt reports increase in swelling although she is using her ice. Pt has call out to surgeon office at this time.   Call end time [id number]            Anastasia Vargas RN    10/25/2024, 09:48 EDT

## 2024-10-25 NOTE — CASE MANAGEMENT/SOCIAL WORK
Case Management Discharge Note      Final Note: dc home with [facility]         Selected Continued Care - Discharged on 10/24/2024 Admission date: 10/23/2024 - Discharge disposition: Home-Health Care Svc      Destination    No services have been selected for the patient.                Durable Medical Equipment    No services have been selected for the patient.                Dialysis/Infusion    No services have been selected for the patient.                Home Medical Care Coordination complete.      Service Provider Services Address Phone Fax Patient Preferred    [facility] [address] [place] [address] [phone] --              Therapy    No services have been selected for the patient.                Community Resources    No services have been selected for the patient.                Community & DME    No services have been selected for the patient.                    Transportation Services  Private: Car    Final Discharge Disposition Code: 01 - home or self-care

## 2024-10-30 ENCOUNTER — TELEPHONE (OUTPATIENT)
Dept: ORTHOPEDIC SURGERY | Facility: HOSPITAL | Age: 84
End: 2024-10-30
Payer: MEDICARE

## 2024-10-30 NOTE — TELEPHONE ENCOUNTER
Called and spoke with Ms. Dumont at this time to see how she is doing as she is 1 week SP TKA. She said she is doing ok now. She stopped taking the pain medication as it doesn't agree with her GI system. She said she had horrible constipation and couldn't eat. She was taking laxatives and stool softeners with no relief. She was finally able to go after stopping the medication. She said before the surgery she was taking Tylenol arthritis and it seemed to help but she wasn't sure she could take it since she was on the Mobic. Told her Tylenol was fine, no Ibuprofen or aleve. She voiced understanding. Told her I would ask the MD about possibly prescribing Ultram as well if she needed something a little more than Tylenol. She said that would great. She does have some swelling in her leg. She is elevating but said that doesn't help much. She has reapplied the ace wrap. Told her that was normal and to elevate her legs and light compression was fine. It can take a few weeks for that to improve. She was asking about a pedal bike that someone else had after the surgery. She has decided to hold off on that as it's already been a week. Dressing looks good. No issues with incision. She is working with  PT and they are coming out again today. Ms. Dumont doesn't have any other questions for me at this time. She was given my contact information should she need anything.     If Ultram is prescribed she would like it sent to   [facility]  [phone]

## 2024-11-25 ENCOUNTER — OFFICE VISIT (OUTPATIENT)
Dept: FAMILY MEDICINE CLINIC | Facility: CLINIC | Age: 84
End: 2024-11-25
Payer: MEDICARE

## 2024-11-25 VITALS
DIASTOLIC BLOOD PRESSURE: 75 MMHG | OXYGEN SATURATION: 97 % | BODY MASS INDEX: 23.07 KG/M2 | TEMPERATURE: 97.1 F | WEIGHT: 122.2 LBS | HEART RATE: 76 BPM | SYSTOLIC BLOOD PRESSURE: 130 MMHG | HEIGHT: 61 IN

## 2024-11-25 DIAGNOSIS — A31.0 MAI (MYCOBACTERIUM AVIUM-INTRACELLULARE): ICD-10-CM

## 2024-11-25 DIAGNOSIS — E78.00 PURE HYPERCHOLESTEROLEMIA: ICD-10-CM

## 2024-11-25 DIAGNOSIS — R73.9 HYPERGLYCEMIA: Primary | ICD-10-CM

## 2024-11-25 PROCEDURE — 1159F MED LIST DOCD IN RCRD: CPT | Performed by: FAMILY MEDICINE

## 2024-11-25 PROCEDURE — 1160F RVW MEDS BY RX/DR IN RCRD: CPT | Performed by: FAMILY MEDICINE

## 2024-11-25 PROCEDURE — 99214 OFFICE O/P EST MOD 30 MIN: CPT | Performed by: FAMILY MEDICINE

## 2024-11-25 PROCEDURE — 1126F AMNT PAIN NOTED NONE PRSNT: CPT | Performed by: FAMILY MEDICINE

## 2024-11-25 RX ORDER — TRAMADOL HYDROCHLORIDE 50 MG/1
50 TABLET ORAL EVERY 8 HOURS PRN
COMMUNITY
Start: 2024-10-31 | End: 2024-11-25

## 2024-11-25 RX ORDER — ONDANSETRON 4 MG/1
4 TABLET, FILM COATED ORAL EVERY 8 HOURS PRN
COMMUNITY
Start: 2024-10-25

## 2024-11-25 RX ORDER — AZITHROMYCIN 500 MG/1
500 TABLET, FILM COATED ORAL
COMMUNITY
End: 2024-11-25

## 2024-11-25 RX ORDER — OXYCODONE AND ACETAMINOPHEN 5; 325 MG/1; MG/1
TABLET ORAL
COMMUNITY
Start: 2024-10-25 | End: 2024-11-25

## 2024-11-25 NOTE — PROGRESS NOTES
"Chief Complaint  Mycobacteria (pt states that she has been placed back on the antibiotics for her lungs hasn't been on them for little over a year.)  Hyperglycemia       Subjective        Jenniffer Dumont presents to [facility]  Hyperlipidemia      Pleasant 84-year-old female who to follow-up for hyperglycemia that is stable and MARIAMA, recently restarted azithromycin 250 mg Monday Wednesday Friday, ethambutol  800 (possibly 500mg - tab and a half)  mg Monday Wednesday Friday, rifampin 600 mg Monday Wednesday Friday - it seems to be preventing flair ups.     Hyperglycemia stable.    Recent left knee replacement that she has been doing well, performed October 23, 2024.  She did not tolerate hydrocodone but having dry mouth all the time and drinking water and getting up 4 times a night and bothering her sleep.     Xerostomia - dry moth since surgery. Only at night, not during the day     Objective   Vital Signs:  /75   Pulse 76   Temp 97.1 °F (36.2 °C)   Ht 154.9 cm (61\")   Wt 55.4 kg (122 lb 3.2 oz)   SpO2 97%   BMI 23.09 kg/m²   Estimated body mass index is 23.09 kg/m² as calculated from the following:    Height as of this encounter: 154.9 cm (61\").    Weight as of this encounter: 55.4 kg (122 lb 3.2 oz).    BMI is within normal parameters. No other follow-up for BMI required.      Physical Exam  Vitals and nursing note reviewed.   Constitutional:       General: She is not in acute distress.     Appearance: She is well-developed.   HENT:      Head: Normocephalic.      Nose: Nose normal.   Cardiovascular:      Rate and Rhythm: Normal rate and regular rhythm.      Heart sounds: Normal heart sounds. No murmur heard.  Pulmonary:      Effort: Pulmonary effort is normal. No respiratory distress.      Breath sounds: Normal breath sounds.   Musculoskeletal:         General: Normal range of motion.   Skin:     General: Skin is warm and dry.      Findings: No rash.   Neurological:      " Mental Status: She is alert and oriented to person, place, and time.   Psychiatric:         Behavior: Behavior normal.         Thought Content: Thought content normal.         Judgment: Judgment normal.        Result Review :  The following data was reviewed by: Su Gabriel MD on 11/25/2024:         Hemoglobin & Hematocrit, Blood (10/24/2024 04:30)  Protime-INR (10/02/2024 11:05)  Hemoglobin A1c (10/02/2024 11:05)  Comprehensive Metabolic Panel (10/02/2024 11:05)  CBC (No Diff) (10/02/2024 11:05)     Assessment and Plan   Diagnoses and all orders for this visit:    1. Hyperglycemia (Primary)  -     Hemoglobin A1c; Future    2. MARIAMA (mycobacterium avium-intracellulare)    3. Pure hypercholesterolemia  -     Comprehensive Metabolic Panel; Future  -     CBC & Differential; Future  -     Lipid Panel; Future    Pleasant 84-year-old female here to follow-up for hyperglycemia that is thankfully well-controlled continue lifestyle management no med changes.  CLAUDE, stable, recently restarted antibiotics from [facility] doing well, EKG up-to-date, sputum culture pending from them.  I am happy to help out with any additional test that is needed.    Thankfully feeling well recovering from left knee replacement, as she is 5 weeks out now taking better than expected, continue with PT and OT, follow-up in 6 months with labs prior         Follow Up   Return in about 6 months (around 5/25/2025), or if symptoms worsen or fail to improve, for Medicare Wellness with fasting labs prior.  Patient was given instructions and counseling regarding her condition or for health maintenance advice. Please see specific information pulled into the AVS if appropriate.

## 2025-01-02 ENCOUNTER — TELEPHONE (OUTPATIENT)
Dept: FAMILY MEDICINE CLINIC | Facility: CLINIC | Age: 85
End: 2025-01-02
Payer: MEDICARE

## 2025-01-02 NOTE — TELEPHONE ENCOUNTER
ATTEMPTED TO WARM TRANSFER BUT NO ANSWER    Caller: Jenniffer Dumont    Relationship to patient: Self    Best call back number: [phone]      Chief complaint: LABS    Type of visit: LABS    Requested date: ASAP

## 2025-01-03 NOTE — TELEPHONE ENCOUNTER
Patient is scheduled for lab draw on Mon Jan 6. Patient mentioned that she is scheduled for a tele health visit with her [facility] physician on Jan 9. Patient states that physician is requesting patients PCP to order a CBC & Diff and CMP? May clinical authorize?

## 2025-04-02 ENCOUNTER — TELEPHONE (OUTPATIENT)
Dept: FAMILY MEDICINE CLINIC | Facility: CLINIC | Age: 85
End: 2025-04-02
Payer: MEDICARE

## 2025-04-02 NOTE — TELEPHONE ENCOUNTER
Caller: Jenniffer Dumont    Relationship: Self    Best call back number: [phone]*    What orders are you requesting (i.e. lab or imaging): LABS FOR DR. URIBE, AND EKG ORDER FOR DR. URIBE    In what timeframe would the patient need to come in: BEFORE 4/15/25    Where will you receive your lab/imaging services: IN OFFICE    Additional notes: PATIENT CALLING REQUESTING DR. PINZON TO PLACE ORDERS FOR LABS, AND EKG, THAT SHE NEEDS TO HAVE DONE FOR DR. URIBE. THE PATIENT REQUEST A CALL AFTER ORDERS HAVE BEEN PLACED TO SCHEDULE A LAB APPOINTMENT BEFORE 4/15/25.

## 2025-04-02 NOTE — TELEPHONE ENCOUNTER
Left VM to inform. Also informed that an EKG will always require an appointment if the patient would like it completed here at the office.

## 2025-04-04 NOTE — TELEPHONE ENCOUNTER
, is it possible to order patient labs and a EKG for the [facility] to have performed in our office? If so, do we schedule the EKG as a nurse visit or office visit?

## 2025-04-07 ENCOUNTER — OFFICE VISIT (OUTPATIENT)
Dept: FAMILY MEDICINE CLINIC | Facility: CLINIC | Age: 85
End: 2025-04-07
Payer: MEDICARE

## 2025-04-07 VITALS
DIASTOLIC BLOOD PRESSURE: 80 MMHG | TEMPERATURE: 97.9 F | HEIGHT: 61 IN | HEART RATE: 76 BPM | OXYGEN SATURATION: 97 % | SYSTOLIC BLOOD PRESSURE: 132 MMHG | WEIGHT: 122 LBS | BODY MASS INDEX: 23.03 KG/M2

## 2025-04-07 DIAGNOSIS — Z51.81 THERAPEUTIC DRUG MONITORING: ICD-10-CM

## 2025-04-07 DIAGNOSIS — R00.0 TACHYCARDIA: ICD-10-CM

## 2025-04-07 DIAGNOSIS — M85.852 OSTEOPENIA OF LEFT HIP: ICD-10-CM

## 2025-04-07 DIAGNOSIS — A31.0 MAI (MYCOBACTERIUM AVIUM-INTRACELLULARE): Primary | ICD-10-CM

## 2025-04-07 NOTE — PROGRESS NOTES
"Chief Complaint  Shortness of Breath (Infections disease dr Fisher will ; need labs and ecg ,pt fasting )    Subjective        Jenniffer Dumont presents to [facility]  Shortness of Breath    She was seen by Dr. Fisher specialist and infectious disease at [facility].  She was told at the last appointment to continue MAC treatment with azithromycin 500 mg every Monday Wednesday Friday, ethambutol 800 mg Monday Wednesday Friday and rifampin 600 mg Monday Wednesday Friday.  Recommendation is to get EKG in a few months and the labs.  They are doing the AF[employer] sputum kit.  Weight stable on ethambutol.    History of Present Illness  The patient is an 84-year-old female who came in for a check-up on her bronchiectasis due to MARIAMA.    She has been doing well without any flare-ups for the past 6 months, which is a big improvement compared to last year when she had to be hospitalized because of a flare-up. She thinks this improvement is because of the medication she takes three times a week on Monday, Wednesday, and Friday. She is under the care of Dr. Fisher, who wanted an EKG to make sure the medication isn't causing any heart problems. She has a history of having a short DE interval and is currently on a medication regimen that includes 3 doses per week. She hasn't had any flare-ups for at least 6 months.    She mentioned losing about 10 pounds over the past year to year and a half, which she thinks is related to her bronchiectasis. Her weight has been stable since November 2024, staying between 117.5 and 120 pounds. Despite losing weight, she still has a good appetite and makes sure to eat a diet rich in protein.       Objective   Vital Signs:  /80   Pulse 76   Temp 97.9 °F (36.6 °C)   Ht 154.9 cm (61\")   Wt 55.3 kg (122 lb)   SpO2 97%   BMI 23.05 kg/m²   Estimated body mass index is 23.05 kg/m² as calculated from the following:    Height as of this encounter: 154.9 cm " "(61\").    Weight as of this encounter: 55.3 kg (122 lb).    BMI is within normal parameters. No other follow-up for BMI required.      Physical Exam  Vitals and nursing note reviewed.   Constitutional:       General: She is not in acute distress.     Appearance: She is well-developed.   HENT:      Head: Normocephalic.      Nose: Nose normal.   Cardiovascular:      Rate and Rhythm: Normal rate.      Heart sounds: Normal heart sounds. No murmur heard.  Pulmonary:      Effort: Pulmonary effort is normal. No respiratory distress.      Breath sounds: Normal breath sounds.   Musculoskeletal:         General: Normal range of motion.   Skin:     General: Skin is warm and dry.      Findings: No rash.   Neurological:      Mental Status: She is alert and oriented to person, place, and time.   Psychiatric:         Behavior: Behavior normal.         Thought Content: Thought content normal.         Judgment: Judgment normal.            Result Review :           ECG 12 Lead    Date/Time: 4/7/2025 9:50 AM  Performed by: Su Gabriel MD    Authorized by: Su Gabriel MD  Comparison: compared with previous ECG from 10/2/2024  Similar to previous ECG  Rhythm: sinus rhythm  Rate: normal  Conduction: conduction normal  ST Segments: ST segments normal  T Waves: T waves normal  QRS axis: normal  Other: no other findings  Other findings comments: Shortened NY interval at 116 ms, previous at 105.    Clinical impression: normal ECG  Comments: Stay well compared to prior, negative P wave and aVF that did correct itself.  Some PVCs.  No significant abnormalities              Assessment and Plan   Diagnoses and all orders for this visit:    1. MARIAMA (mycobacterium avium-intracellulare) (Primary)  -     ECG 12 Lead  -     Comprehensive Metabolic Panel  -     CBC & Differential  -     TSH Rfx On Abnormal To Free T4    2. Therapeutic drug monitoring  -     ECG 12 Lead  -     Comprehensive Metabolic Panel  -     CBC & Differential  -     TSH " Rfx On Abnormal To Free T4    3. Osteopenia of left hip  -     TSH Rfx On Abnormal To Free T4  -     Vitamin D,25-Hydroxy    4. Tachycardia  -     TSH Rfx On Abnormal To Free T4           Assessment & Plan  Bronchiectasis:No flare-up for at least 6 months, positive outcome compared to previous year.  She is currently on ethambutol 800 mg, azithromycin 500 mg, and rifampin 600 mg: Take all 3 tablets Monday Wednesday Friday.  - EKG results stable, no QTc prolongation detected, short NH similar to prior  - Observed PVC not clinically significant  -Labs to be conducted today  - A1c test deferred until wellness visit on 04/29/2025, too soon to be performed today    Weight loss:  - Lost about 10 pounds over the past year to year and a half, attributed to bronchiectasis  - Advised to continue current diet with good protein intake  - Monitor weight, stable since November    Follow-up:  - Patient to follow up on 04/29/2025      Follow Up   Return if symptoms worsen or fail to improve, for FU as scheduled, ok to cancel the labs .  Patient was given instructions and counseling regarding her condition or for health maintenance advice. Please see specific information pulled into the AVS if appropriate.         Su Gabriel MD      Patient or patient representative verbalized consent for the use of Ambient Listening during the visit with  Su Gabriel MD for chart documentation. 4/7/2025  10:25 EDT

## 2025-04-08 LAB
25(OH)D3+25(OH)D2 SERPL-MCNC: 33.6 NG/ML (ref 30–100)
ALBUMIN SERPL-MCNC: 4.1 G/DL (ref 3.5–5.2)
ALBUMIN/GLOB SERPL: 1.8 G/DL
ALP SERPL-CCNC: 62 U/L (ref 39–117)
ALT SERPL-CCNC: 10 U/L (ref 1–33)
AST SERPL-CCNC: 19 U/L (ref 1–32)
BASOPHILS # BLD AUTO: 0.06 10*3/MM3 (ref 0–0.2)
BASOPHILS NFR BLD AUTO: 0.8 % (ref 0–1.5)
BILIRUB SERPL-MCNC: 0.8 MG/DL (ref 0–1.2)
BUN SERPL-MCNC: 18 MG/DL (ref 8–23)
BUN/CREAT SERPL: 28.6 (ref 7–25)
CALCIUM SERPL-MCNC: 9.2 MG/DL (ref 8.6–10.5)
CHLORIDE SERPL-SCNC: 100 MMOL/L (ref 98–107)
CO2 SERPL-SCNC: 27.5 MMOL/L (ref 22–29)
CREAT SERPL-MCNC: 0.63 MG/DL (ref 0.57–1)
EGFRCR SERPLBLD CKD-EPI 2021: 86.5 ML/MIN/1.73
EOSINOPHIL # BLD AUTO: 0.23 10*3/MM3 (ref 0–0.4)
EOSINOPHIL NFR BLD AUTO: 2.9 % (ref 0.3–6.2)
ERYTHROCYTE [DISTWIDTH] IN BLOOD BY AUTOMATED COUNT: 12.7 % (ref 12.3–15.4)
GLOBULIN SER CALC-MCNC: 2.3 GM/DL
GLUCOSE SERPL-MCNC: 91 MG/DL (ref 65–99)
HCT VFR BLD AUTO: 37.1 % (ref 34–46.6)
HGB BLD-MCNC: 12.2 G/DL (ref 12–15.9)
IMM GRANULOCYTES # BLD AUTO: 0.03 10*3/MM3 (ref 0–0.05)
IMM GRANULOCYTES NFR BLD AUTO: 0.4 % (ref 0–0.5)
LYMPHOCYTES # BLD AUTO: 1.57 10*3/MM3 (ref 0.7–3.1)
LYMPHOCYTES NFR BLD AUTO: 20.1 % (ref 19.6–45.3)
MCH RBC QN AUTO: 29.6 PG (ref 26.6–33)
MCHC RBC AUTO-ENTMCNC: 32.9 G/DL (ref 31.5–35.7)
MCV RBC AUTO: 90 FL (ref 79–97)
MONOCYTES # BLD AUTO: 0.85 10*3/MM3 (ref 0.1–0.9)
MONOCYTES NFR BLD AUTO: 10.9 % (ref 5–12)
NEUTROPHILS # BLD AUTO: 5.07 10*3/MM3 (ref 1.7–7)
NEUTROPHILS NFR BLD AUTO: 64.9 % (ref 42.7–76)
NRBC BLD AUTO-RTO: 0 /100 WBC (ref 0–0.2)
PLATELET # BLD AUTO: 306 10*3/MM3 (ref 140–450)
POTASSIUM SERPL-SCNC: 4.8 MMOL/L (ref 3.5–5.2)
PROT SERPL-MCNC: 6.4 G/DL (ref 6–8.5)
RBC # BLD AUTO: 4.12 10*6/MM3 (ref 3.77–5.28)
SODIUM SERPL-SCNC: 137 MMOL/L (ref 136–145)
TSH SERPL DL<=0.005 MIU/L-ACNC: 2.37 UIU/ML (ref 0.27–4.2)
WBC # BLD AUTO: 7.81 10*3/MM3 (ref 3.4–10.8)

## 2025-04-29 ENCOUNTER — OFFICE VISIT (OUTPATIENT)
Dept: FAMILY MEDICINE CLINIC | Facility: CLINIC | Age: 85
End: 2025-04-29
Payer: MEDICARE

## 2025-04-29 VITALS
DIASTOLIC BLOOD PRESSURE: 70 MMHG | BODY MASS INDEX: 23.22 KG/M2 | OXYGEN SATURATION: 97 % | WEIGHT: 123 LBS | HEIGHT: 61 IN | TEMPERATURE: 97.8 F | SYSTOLIC BLOOD PRESSURE: 118 MMHG | HEART RATE: 87 BPM

## 2025-04-29 DIAGNOSIS — R73.9 HYPERGLYCEMIA: ICD-10-CM

## 2025-04-29 DIAGNOSIS — E78.00 PURE HYPERCHOLESTEROLEMIA: ICD-10-CM

## 2025-04-29 DIAGNOSIS — M85.852 OSTEOPENIA OF LEFT HIP: ICD-10-CM

## 2025-04-29 DIAGNOSIS — J47.1 BRONCHIECTASIS WITH (ACUTE) EXACERBATION: ICD-10-CM

## 2025-04-29 DIAGNOSIS — Z00.00 MEDICARE ANNUAL WELLNESS VISIT, SUBSEQUENT: Primary | ICD-10-CM

## 2025-04-29 PROCEDURE — 1159F MED LIST DOCD IN RCRD: CPT | Performed by: FAMILY MEDICINE

## 2025-04-29 PROCEDURE — 1125F AMNT PAIN NOTED PAIN PRSNT: CPT | Performed by: FAMILY MEDICINE

## 2025-04-29 PROCEDURE — 1170F FXNL STATUS ASSESSED: CPT | Performed by: FAMILY MEDICINE

## 2025-04-29 PROCEDURE — G0439 PPPS, SUBSEQ VISIT: HCPCS | Performed by: FAMILY MEDICINE

## 2025-04-29 PROCEDURE — 1160F RVW MEDS BY RX/DR IN RCRD: CPT | Performed by: FAMILY MEDICINE

## 2025-04-29 NOTE — PROGRESS NOTES
Subjective   The ABCs of the Annual Wellness Visit  Medicare Wellness Visit      Jenniffer Dumont is a 84 y.o. patient who presents for a Medicare Wellness Visit.    The following portions of the patient's history were reviewed and   updated as appropriate: allergies, current medications, past family history, past medical history, past social history, past surgical history, and problem list.    Compared to one year ago, the patient's physical   health is the same.  Compared to one year ago, the patient's mental   health is worse.    Recent Hospitalizations:  She was not admitted to the hospital during the last year.     Current Medical Providers:  Patient Care Team:  Su Gabriel MD as PCP - General (Family Medicine)  Adriana Rodriguez MD (Pulmonary Disease)  Elsie Perkins MD (Sleep Medicine)  Cata Fisher MD (Infectious Diseases)    Outpatient Medications Prior to Visit   Medication Sig Dispense Refill    albuterol (PROVENTIL) (2.5 MG/3ML) 0.083% nebulizer solution Take 2.5 mg by nebulization Every 6 (Six) Hours As Needed for Wheezing or Shortness of Air. 100 vial 11    albuterol sulfate  (90 Base) MCG/ACT inhaler Inhale 2 puffs Every 4 (Four) Hours As Needed for Wheezing or Shortness of Air.      Ascorbic Acid (VITAMIN C) 500 MG capsule Take 500 mg by mouth Every Morning.      azithromycin (ZITHROMAX) 250 MG tablet Take 1 tablet by mouth 3 (Three) Times a Week. MONDAY, WEDNESDAY, FRIDAY      Calcium Carbonate-Vitamin D (CALCIUM 500 + D PO) Take 1 tablet by mouth Daily.      Coenzyme Q10 (COQ-10 PO) Take 400 mg by mouth Every Morning. TO HOLD 1 WEEK PRIOR TO OR      esomeprazole (nexIUM) 40 MG capsule Take 1 capsule by mouth Every Morning Before Breakfast.      ethambutol (MYAMBUTOL) 400 MG tablet Take 2 tablets by mouth.      ETHAMBUTOL HCL PO Take  by mouth 3 (Three) Times a Week. MONDAY, WEDNESDAY, FRIDAY      fexofenadine (ALLEGRA) 180 MG tablet Take 1 tablet by mouth Daily As Needed.   "    fluticasone (FLONASE) 50 MCG/ACT nasal spray Administer 2 sprays into the nostril(s) as directed by provider Daily As Needed.      MAGNESIUM PO Take 1 tablet by mouth Daily.      ondansetron (ZOFRAN) 4 MG tablet Take 1 tablet by mouth Every 8 (Eight) Hours As Needed.      RIFAMPIN PO Take 600 mg by mouth 3 (Three) Times a Week. MONDAY, WEDNESDAY, FRIDAY      Sodium Chloride 3.5 % nebulizer solution Inhale 4 mL 2 (Two) Times a Day. Pt states uses every morning and if needed again in evening  with an oscillating vest      Symbicort 160-4.5 MCG/ACT inhaler Inhale 2 puffs 2 (Two) Times a Day.       No facility-administered medications prior to visit.     No opioid medication identified on active medication list. I have reviewed chart for other potential  high risk medication/s and harmful drug interactions in the elderly.      Aspirin is not on active medication list.  Aspirin use is not indicated based on review of current medical condition/s. Risk of harm outweighs potential benefits.  .    Patient Active Problem List   Diagnosis    Bronchiectasis with (acute) exacerbation    Cough    HLD (hyperlipidemia)    MARIAMA (mycobacterium avium-intracellulare)    Allergic sinusitis    History of melanoma    Gastroesophageal reflux disease    Hyperglycemia    Pneumonia due to infectious organism    Acute hyponatremia    Chronic pain of left knee    Osteopenia of left hip    Total knee replacement status, left     Advance Care Planning Advance Directive is not on file.  ACP discussion was held with the patient during this visit. Patient has an advance directive (not in EMR), copy requested.            Objective   Vitals:    04/29/25 1034   BP: 118/70   Pulse: 87   Temp: 97.8 °F (36.6 °C)   SpO2: 97%   Weight: 55.8 kg (123 lb)   Height: 154.9 cm (61\")   PainSc: 3    PainLoc: Knee       Estimated body mass index is 23.24 kg/m² as calculated from the following:    Height as of this encounter: 154.9 cm (61\").    Weight as of this " encounter: 55.8 kg (123 lb).    BMI is within normal parameters. No other follow-up for BMI required.           Does the patient have evidence of cognitive impairment? No                                                                                                Health  Risk Assessment    Smoking Status:  Social History     Tobacco Use   Smoking Status Former    Current packs/day: 0.00    Average packs/day: 1 pack/day for 30.0 years (30.0 ttl pk-yrs)    Types: Cigarettes    Quit date:     Years since quittin.3   Smokeless Tobacco Never   Tobacco Comments    Smoked 1 pack a day for 20 years      Alcohol Consumption:  Social History     Substance and Sexual Activity   Alcohol Use Not Currently    Comment: OCCASIONAL       Fall Risk Screen  STEADI Fall Risk Assessment was completed, and patient is at LOW risk for falls.Assessment completed on:2025    Depression Screening   Little interest or pleasure in doing things? Not at all   Feeling down, depressed, or hopeless? Not at all   PHQ-2 Total Score 0      Health Habits and Functional and Cognitive Screenin/29/2025    10:42 AM   Functional & Cognitive Status   Do you have difficulty preparing food and eating? No   Do you have difficulty bathing yourself, getting dressed or grooming yourself? No   Do you have difficulty using the toilet? No   Do you have difficulty moving around from place to place? Yes   Do you have trouble with steps or getting out of a bed or a chair? Yes   Current Diet Well Balanced Diet   Dental Exam Up to date   Eye Exam Up to date   Exercise (times per week) 1 times per week   Current Exercises Include Walking   Do you need help using the phone?  No   Are you deaf or do you have serious difficulty hearing?  Yes   Do you need help to go to places out of walking distance? Yes   Do you need help shopping? Yes   Do you need help preparing meals?  No   Do you need help with housework?  Yes   Do you need help with laundry? Yes    Do you need help taking your medications? No   Do you need help managing money? No   Do you ever drive or ride in a car without wearing a seat belt? No   Have you felt unusual stress, anger or loneliness in the last month? Yes   Who do you live with? Spouse   If you need help, do you have trouble finding someone available to you? No   Have you been bothered in the last four weeks by sexual problems? No   Do you have difficulty concentrating, remembering or making decisions? No           Age-appropriate Screening Schedule:  Refer to the list below for future screening recommendations based on patient's age, sex and/or medical conditions. Orders for these recommended tests are listed in the plan section. The patient has been provided with a written plan.    Health Maintenance List  Health Maintenance   Topic Date Due    ZOSTER VACCINE (1 of 2) Never done    COVID-19 Vaccine (9 - 2024-25 season) 04/01/2025    TDAP/TD VACCINES (1 - Tdap) 10/26/2025 (Originally 5/3/1959)    INFLUENZA VACCINE  07/01/2025    LIPID PANEL  01/16/2026    ANNUAL WELLNESS VISIT  04/29/2026    RSV Vaccine - Adults  Completed    Pneumococcal Vaccine 50+  Completed    MAMMOGRAM  Discontinued    DXA SCAN  Discontinued                                                                                                                                                CMS Preventative Services Quick Reference  Risk Factors Identified During Encounter  Chronic Pain: Natural history and expected course discussed. Questions answered. Getting step cell injection in the R ear   Hearing Problem:  UTD  Dental Screening Recommended  Vision Screening Recommended    The above risks/problems have been discussed with the patient.  Pertinent information has been shared with the patient in the After Visit Summary.  An After Visit Summary and PPPS were made available to the patient.    Follow Up:   Next Medicare Wellness visit to be scheduled in 1 year.     Additional  E&M Note during same encounter follows:  Patient has additional, significant, and separately identifiable condition(s)/problem(s) that require work above and beyond the Medicare Wellness Visit     Chief Complaint  Medicare Wellness-subsequent (Will need knee surgery   on right knee but can't do it at the moment  )    Subjective    HPI         The patient presents for a Medicare wellness visit.    Anxiety and Depression  Reports anxiety and depression due to 's declining health and recent hospitalizations for respiratory distress. Hospice care is being considered for him. No suicidal ideation. Strong family support system. No hospitalizations for her in the past year.  - Onset: Due to 's declining health and recent hospitalizations.  - Character: Anxiety and depression.  - Alleviating Factors: Strong family support system.  - Severity: No suicidal ideation.    Knee Issues  History of meniscal tear with total knee replacement in October. Knee still healing. 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 Scheduled stem cell injection from her son, a physician, to delay further surgery. Previous PRP therapy ineffective. KT tape provides some relief but does not adhere well. Effective pain management with Advil, 2 tablets in the morning and 1 or 2 at night.  - Onset: Meniscal tear with total knee replacement in October.  - Location: Knee.  - Duration: Knee still healing; ongoing arthritis and bone-on-bone degeneration in contralateral knee.  - Character: Arthritis and bone-on-bone degeneration.  - Alleviating Factors: Scheduled stem cell injection; KT tape provides some relief; effective pain management with Advil.  - Severity: KT tape does not adhere well; previous PRP therapy ineffective.    Lung Disease  History of lung disease with sputum culture submitted last week. No flare-ups in the past six months. Suspected persistent mycobacterial infection;  "continues medication until two clear sputum cultures.  - Onset: History of lung disease.  - Duration: No flare-ups in the past six months.  - Character: Suspected persistent mycobacterial infection.  - Timing: Continues medication until two clear sputum cultures.    Hearing Decline  Gradual hearing decline attributed to aging and azithromycin effects. Uses hearing aids and closed captions for TV.  - Onset: Gradual.  - Character: Hearing decline.  - Alleviating Factors: Uses hearing aids and closed captions for TV.    Osteopenia and Diverticulitis  Diagnosed osteopenia with occasional mild left-sided abdominal pain from diverticulitis, resolving spontaneously.  - Onset: Occasional.  - Location: Left-sided abdominal pain.  - Character: Mild pain from diverticulitis.  - Duration: Resolving spontaneously.    No memory concerns; uses notebook for tasks. Annual mammograms and calcium with vitamin D supplements.    PAST SURGICAL HISTORY:  - Total knee replacement in October          Objective   Vital Signs:  /70   Pulse 87   Temp 97.8 °F (36.6 °C)   Ht 154.9 cm (61\")   Wt 55.8 kg (123 lb)   SpO2 97%   BMI 23.24 kg/m²   Physical Exam  Vitals and nursing note reviewed.   Constitutional:       General: She is not in acute distress.     Appearance: She is well-developed.   HENT:      Head: Normocephalic.      Nose: Nose normal.   Neck:      Vascular: No carotid bruit.   Cardiovascular:      Rate and Rhythm: Normal rate and regular rhythm.      Heart sounds: Normal heart sounds. No murmur heard.  Pulmonary:      Effort: Pulmonary effort is normal. No respiratory distress.      Breath sounds: Normal breath sounds.   Abdominal:      Palpations: Abdomen is soft.      Tenderness: There is no abdominal tenderness.   Musculoskeletal:         General: Normal range of motion.   Skin:     General: Skin is warm and dry.      Findings: No rash.   Neurological:      Mental Status: She is alert and oriented to person, place, and " time.   Psychiatric:         Behavior: Behavior normal.         Thought Content: Thought content normal.         Judgment: Judgment normal.           Respiratory: Clear to auscultation, no wheezing, rales or rhonchi  Gastrointestinal: Soft, no tenderness, no distention, no masses    The following data was reviewed by: Su Gabriel MD on 04/29/2025:      Vitamin D,25-Hydroxy (04/07/2025 10:28)  TSH Rfx On Abnormal To Free T4 (04/07/2025 10:28)  CBC & Differential (04/07/2025 10:28)  Comprehensive Metabolic Panel (04/07/2025 10:28)  Results  Labs   - Blood work: Normal           Assessment and Plan   Diagnoses and all orders for this visit:    1. Medicare annual wellness visit, subsequent (Primary)    2. Bronchiectasis with (acute) exacerbation    3. Hyperglycemia  -     Hemoglobin A1c; Future    4. Pure hypercholesterolemia  -     Comprehensive Metabolic Panel; Future  -     CBC & Differential; Future  -     Lipid Panel; Future    5. Osteopenia of left hip  -     Vitamin D,25-Hydroxy; Future           1. Medicare wellness visit:  - Memory intact, no significant concerns  - Normal kidney function  - Due for Tdap vaccine, can be administered at local pharmacy  - Prefers to discontinue mammograms and DEXA scans    2. Mycobacterial infection: Followed by specialist at [facility]  - No flare-up for 6 months  - Sputum culture sent last week; results pending  - Continue current medication until 2 clear sputum cultures  - Awaiting culture results, typically take 6 weeks    3. Osteopenia:  - Taking calcium with vitamin D supplements  - May increase supplements if condition worsens  - Discussed trampoline exercises for bone density  - Prefers not to know if condition worsens, as bones are not snapping or breaking    4. Depression and anxiety:  - Experiencing depression and anxiety due to 's health  - Encouraged to maintain support system and seek help if symptoms worsen or if experiencing withdrawal or  isolation  - Not suicidal but feels overwhelmed    5. Knee pain:  - Ongoing pain following total knee replacement in 10/2024  - Considering stem cell injections to delay further surgery  - Advised to use Advil sparingly due to potential kidney damage and heart inflammation  - KT tape helps but does not stay on    Follow-up:  - Blood work will be ordered for next visit  - Will provide living will copy at next visit  - Follow-up in 6 months         Follow Up   Return in about 5 months (around 9/29/2025), or if symptoms worsen or fail to improve, for Recheck hyperglycemia with labs prior.  Patient was given instructions and counseling regarding her condition or for health maintenance advice. Please see specific information pulled into the AVS if appropriate.  Patient or patient representative verbalized consent for the use of Ambient Listening during the visit with  Su aGbriel MD for chart documentation. 4/29/2025  13:46 EDT

## (undated) DEVICE — MAT FLR ABSORBENT LG 4FT 10 2.5FT

## (undated) DEVICE — THE STERILE LIGHT HANDLE COVER IS USED WITH STERIS SURGICAL LIGHTING AND VISUALIZATION SYSTEMS.

## (undated) DEVICE — APPL CHLORAPREP HI/LITE 26ML ORNG

## (undated) DEVICE — SYR LUERLOK 30CC

## (undated) DEVICE — GLV SURG SENSICARE PI LF PF 8 GRN STRL

## (undated) DEVICE — DUAL CUT SAGITTAL BLADE

## (undated) DEVICE — TRAP FLD MINIVAC MEGADYNE 100ML

## (undated) DEVICE — ANTIBACTERIAL UNDYED BRAIDED (POLYGLACTIN 910), SYNTHETIC ABSORBABLE SUTURE: Brand: COATED VICRYL

## (undated) DEVICE — STRAP STIRUP WO/ RNG

## (undated) DEVICE — INTENDED TO SUPPORT AND MAINTAIN THE POSITION OF AN ANESTHETIZED PATIENT DURING SURGERY: Brand: HERMANTOR XL PINK KNEE POSITIONING PAD

## (undated) DEVICE — ADHS SKIN SURG TISS VISC PREMIERPRO EXOFIN HI/VISC FAST/DRY

## (undated) DEVICE — BNDG,ELSTC,MATRIX,STRL,4"X5YD,LF,HOOK&LP: Brand: MEDLINE

## (undated) DEVICE — GLV SURG SIGNATURE ESSENTIAL PF LTX SZ8

## (undated) DEVICE — DRSNG SLVR/ANTIBAC PRIMASEAL POST/OP ADHS 3.5X12IN

## (undated) DEVICE — SUT ETHIB 1 CT1 30IN  X425H

## (undated) DEVICE — SOL IRR NACL 0.9PCT 3000ML

## (undated) DEVICE — GLV SURG BIOGEL LTX PF 8

## (undated) DEVICE — CEMENT MIXING SYSTEM WITH FEMORAL BREAKWAY NOZZLE: Brand: REVOLUTION

## (undated) DEVICE — PREP SOL POVIDONE/IODINE BT 4OZ

## (undated) DEVICE — NEEDLE, QUINCKE, 20GX3.5": Brand: MEDLINE

## (undated) DEVICE — 3M™ IOBAN™ 2 ANTIMICROBIAL INCISE DRAPE 6640EZ: Brand: IOBAN™ 2

## (undated) DEVICE — DRAPE,U/ SHT,SPLIT,PLAS,STERIL: Brand: MEDLINE

## (undated) DEVICE — PK KN TOTL 40

## (undated) DEVICE — RECIPROCATING BLADE, DOUBLE SIDED, OFFSET  (70.0 X 0.64 X 12.6MM)

## (undated) DEVICE — GLV SURG PREMIERPRO ORTHO LTX PF SZ8 BRN

## (undated) DEVICE — SOL ISO/ALC 70PCT 4OZ

## (undated) DEVICE — DECANTER BAG 9": Brand: MEDLINE INDUSTRIES, INC.

## (undated) DEVICE — PATIENT RETURN ELECTRODE, SINGLE-USE, CONTACT QUALITY MONITORING, ADULT, WITH 9FT CORD, FOR PATIENTS WEIGING OVER 33LBS. (15KG): Brand: MEGADYNE